# Patient Record
Sex: FEMALE | Race: BLACK OR AFRICAN AMERICAN | NOT HISPANIC OR LATINO
[De-identification: names, ages, dates, MRNs, and addresses within clinical notes are randomized per-mention and may not be internally consistent; named-entity substitution may affect disease eponyms.]

---

## 2019-12-05 ENCOUNTER — APPOINTMENT (OUTPATIENT)
Dept: ORTHOPEDIC SURGERY | Facility: CLINIC | Age: 59
End: 2019-12-05
Payer: COMMERCIAL

## 2019-12-05 VITALS — WEIGHT: 260 LBS | HEIGHT: 66 IN | BODY MASS INDEX: 41.78 KG/M2

## 2019-12-05 VITALS — OXYGEN SATURATION: 98 % | SYSTOLIC BLOOD PRESSURE: 161 MMHG | HEART RATE: 96 BPM | DIASTOLIC BLOOD PRESSURE: 90 MMHG

## 2019-12-05 DIAGNOSIS — Z86.39 PERSONAL HISTORY OF OTHER ENDOCRINE, NUTRITIONAL AND METABOLIC DISEASE: ICD-10-CM

## 2019-12-05 DIAGNOSIS — Z87.39 PERSONAL HISTORY OF OTHER DISEASES OF THE MUSCULOSKELETAL SYSTEM AND CONNECTIVE TISSUE: ICD-10-CM

## 2019-12-05 DIAGNOSIS — Z78.9 OTHER SPECIFIED HEALTH STATUS: ICD-10-CM

## 2019-12-05 DIAGNOSIS — Z82.62 FAMILY HISTORY OF OSTEOPOROSIS: ICD-10-CM

## 2019-12-05 DIAGNOSIS — Z86.79 PERSONAL HISTORY OF OTHER DISEASES OF THE CIRCULATORY SYSTEM: ICD-10-CM

## 2019-12-05 DIAGNOSIS — Z82.61 FAMILY HISTORY OF ARTHRITIS: ICD-10-CM

## 2019-12-05 PROCEDURE — 72170 X-RAY EXAM OF PELVIS: CPT

## 2019-12-05 PROCEDURE — 73562 X-RAY EXAM OF KNEE 3: CPT | Mod: 50

## 2019-12-05 PROCEDURE — 99204 OFFICE O/P NEW MOD 45 MIN: CPT

## 2019-12-10 PROBLEM — Z82.61 FAMILY HISTORY OF ARTHRITIS: Status: ACTIVE | Noted: 2019-12-05

## 2019-12-10 PROBLEM — Z86.79 HISTORY OF HYPERTENSION: Status: RESOLVED | Noted: 2019-12-05 | Resolved: 2019-12-10

## 2019-12-10 PROBLEM — Z82.62 FAMILY HISTORY OF OSTEOPOROSIS: Status: ACTIVE | Noted: 2019-12-05

## 2019-12-10 PROBLEM — Z87.39 HISTORY OF ARTHRITIS: Status: RESOLVED | Noted: 2019-12-05 | Resolved: 2019-12-10

## 2019-12-10 PROBLEM — Z86.39 HISTORY OF DIABETES MELLITUS: Status: RESOLVED | Noted: 2019-12-05 | Resolved: 2019-12-10

## 2019-12-10 PROBLEM — Z78.9 DOES NOT USE ILLICIT DRUGS: Status: ACTIVE | Noted: 2019-12-05

## 2019-12-10 NOTE — PHYSICAL EXAM
[Antalgic] : antalgic [Ankle] : ankle 2+ and symmetric bilaterally [LE] : Sensory: Intact in bilateral lower extremities [Knee] : patellar 2+ and symmetric bilaterally [DP] : dorsalis pedis 2+ and symmetric bilaterally [PT] : posterior tibial 2+ and symmetric bilaterally [de-identified] : On general examination the patient is adequately groomed and nourished. The vital parameters are as recorded. \par There is no lymphedema or diffuse swelling, no varicose veins, no skin warmth/erythema/scars/swelling, no ulcers and no palpable lymph nodes or masses in both lower extremities. Bilateral pedal pulses are well palpable.\par Upper Extremity:\par Both right and left upper extremities are unremarkable in terms of skin rash, lesions, pigmentation, redness, tenderness, swelling, joint instability, abnormal deformity or crepitus. The overall range of motion, sensation, motor tone and strength testing are normal.\par \par Bilateral Knee Exam: \par The gait is bilateral stiff knee antalgic.\par Knee alignment:         varus\par Both knees demonstrate no scars and the skin has no warmth, erythema, swelling or tenderness. \par Both knees have a range of motion of\par Extension:                    Right 0 degrees     Left 0 degrees\par Flexion:                                   Right 110 degrees      Left 110 degrees\par There is bilateral knee medial joint line tenderness. There is bilateral knee mild effusion. \par Joellen's test is positive. Jagjit test is positive.\par Lachman's test, Anterior/Posterior Drawer test and Pivot Shift Tests are negative. \par There is bilateral knee grade 1 MCL mediolateral laxity and no anteroposterior instability. \par Patella compression test is positive and patellofemoral tracking is normal with no lateral subluxation, apprehension or instability. \par Right knee quadriceps and hamstrings power is 4+.\par Left knee quadriceps and hamstrings power is 4+.\par \par Spine Exam:\par There is mild curvature of the spine with loss of normal lumbar lordosis. The skin is devoid of erythema,  pigmentation or rashes. There is a well healed scar from prior fusion. There is mild lumbar spasm and tenderness especially at L4-L5 or L5-S1. \par The range of motion of the lumbar spine is limited by pain and spasm. Forward flexion is 80% normal, extension catch positive, lateral flexion and rotation 80% normal. There is no lumbar spine imbalance or instability detected.\par Bilateral passive SLR is right 40 degrees, left 40 degrees in supine and sitting positions. Lasegue's Test is negative.\par Neurology:\par The patient is alert and oriented in person, place and time. The mood is calm and affect is normal.\par Testing for coordination including Rhomberg's Test and Finger-Nose Test, sensation, motor tone and power and deep tendon reflexes in both lower extremities is normal.\par \par Hip Exam:\par The gait and station is normal\par The patient has equal leg lengths and no pelvic tilt. Jin/Jamilah test is 7 inches on the right and 7 inches on the left. Active SLR is 60 degrees on the right and 60 degrees on the left. Both hips demonstrate no scars and the skin has no signs of inflammation or tenderness. \par Both Hips have a normal range of motion of flexion to 100 degrees, abduction 40 degrees, adduction 20 degrees, external rotation 40 degrees, internal rotation 20 degrees with symmetrical motion in flexion and extension. There is no flexion contracture, deformity or instability. Labral impingement tests are negative.\par Both hips flexor, abductor and extensor power is normal.\par  [Obese] : obese [de-identified] : The following radiographs were ordered and read by me during this patients visit. I reviewed each radiograph in detail with the patient and discussed the findings as highlighted below. \par AP, lateral and skyline views of the bilateral knees confirm advanced degenerative joint disease with medial joint space narrowing and extensive osteophyte formation of the left knee, with medial and lateral joint space collapse of the right knee, and extensive osteophyte formation\par AP view of the pelvis are within normal limits\par

## 2019-12-10 NOTE — HISTORY OF PRESENT ILLNESS
[de-identified] : Ms. MILADIS FRANCIS is a 59 year old female  presenting with bilateral knee pain, worsening over the last couple years. She localizes the pain to the medial anterior aspect of the bilateral knees. She admits to occasional swelling, clicking and grinding of the knee. She has occasional buckling as well. she describes the pain as throbbing and burning at times, and states the pain is present when walking, bending, laying down, climbing stairs, standing for long periods of time. . \par She admits to a previous history of treatment to bilateral knees. She notes she underwent Arthroscopy to the right knee in 2017, with only mild relief following surgery, with now worsening of pain. She also admits to a round of Orthovisc to the left knee one month ago, with no relief. she has had cortisone injections to bilateral knees, as well as PT, with no lasting relief. She notes she has been doing exercises on her own at the gym, but notes she is limited to pain. she has limitations of  quality of life, and is here to discuss total knee replacement.\par PSHX: right knee scope 2017, lumbar spine fusion L4-5 2018. \par  [8] : a current pain level of 8/10 [6] : an average pain level of 6/10 [Worsening] : worsening

## 2019-12-10 NOTE — REVIEW OF SYSTEMS
[Joint Stiffness] : joint stiffness [Joint Pain] : joint pain [Joint Swelling] : joint swelling [Constipation] : constipation [Muscle Weakness] : muscle weakness [Negative] : Heme/Lymph

## 2019-12-10 NOTE — DISCUSSION/SUMMARY
[de-identified] : Bilateral knee advanced degenerative joint disease with prior lumbar fusion. \par \par The natural history and treatment of degenerative arthritis was discussed with the patient at length today. The spectrum of treatment including nonoperative modalities to surgical intervention was elucidated. Noninvasive and nonoperative treatment modalities include weight reduction, activity modification with low impact exercise,  as needed use of acetaminophen or anti-inflammatory medications if tolerated, glucosamine/chondroitin supplements, and physical therapy. Further treatments can include corticosteroid injection and the use of viscosupplementation with hyaluronic acid injections. Definitive surgical treatment can certainly include total joint arthroplasty also. The risks and benefits of each treatment options was discussed and all questions were answered.\par \par In view of lack of adequate pain relief with conservative (non-surgical) management protocol including physical therapy, home exercises, weight loss, activity modification, NSAIDS; the patient will ultimately require a knee replacement for both knees. We discussed at this time that she is not a surgical candidate due to obesity, as well as recent injection to the left knee. We discussed that she would not be a candidate for three months following a gel injection due to risk of infection. \par \par The patient was informed of the findings and recommended conservative management in the form of a home exercise program, activity modifications, stationary bicycling, swimming and weight loss program. A trial of Glucosamine and Chondroiten Sulphate was recommended.\par A prescription for a course of physical therapy was provided.\par A prescription for non-steroidal anti-inflammatory medication - nabumetone was provided and the risks, benefits and side effects were discussed.\par I have recommended Euflexxa injections to the RIGHT knee and the patient agreed to proceed, and the risks, benefits and side effects were discussed.Follow-up appointment was recommended once the injection is received in the office to begin the series\par \par \par

## 2019-12-10 NOTE — CONSULT LETTER
[Dear  ___] : Dear  [unfilled], [Consult Closing:] : Thank you very much for allowing me to participate in the care of this patient.  If you have any questions, please do not hesitate to contact me. [Please see my note below.] : Please see my note below. [Consult Letter:] : I had the pleasure of evaluating your patient, [unfilled]. [Sincerely,] : Sincerely, [FreeTextEntry2] : Heather Almaraz [FreeTextEntry3] : Hamlet Knight MD\par \par ______________________________________________\par [facility]: Hip/Knee Arthroplasty\par [address], [address], [place] [address]\par (t) [phone]\par (f) [phone]\par

## 2020-01-23 ENCOUNTER — APPOINTMENT (OUTPATIENT)
Dept: ORTHOPEDIC SURGERY | Facility: CLINIC | Age: 60
End: 2020-01-23
Payer: COMMERCIAL

## 2020-01-23 VITALS
HEART RATE: 90 BPM | SYSTOLIC BLOOD PRESSURE: 123 MMHG | HEIGHT: 66 IN | WEIGHT: 263 LBS | DIASTOLIC BLOOD PRESSURE: 72 MMHG | BODY MASS INDEX: 42.27 KG/M2

## 2020-01-23 PROCEDURE — 20610 DRAIN/INJ JOINT/BURSA W/O US: CPT | Mod: RT

## 2020-01-30 ENCOUNTER — APPOINTMENT (OUTPATIENT)
Dept: ORTHOPEDIC SURGERY | Facility: CLINIC | Age: 60
End: 2020-01-30
Payer: COMMERCIAL

## 2020-01-30 PROCEDURE — 20610 DRAIN/INJ JOINT/BURSA W/O US: CPT | Mod: RT

## 2020-01-30 NOTE — PROCEDURE
[Injection] : Injection [Knee Joint] : knee joint [Right] : of the right [Osteoarthritis] : Osteoarthritis [Inflammation] : Inflammation [Patient] : patient [Risk] : risk [Benefits] : benefits [Alternatives] : alternatives [Bleeding] : bleeding [Allergic Reaction] : allergic reaction [Infection] : infection [Alcohol] : Alcohol [Verbal Consent Obtained] : verbal consent was obtained prior to the procedure [Betadine] : Betadine [Ethyl Chloride Spray] : ethyl chloride spray was used as a topical anesthetic [Lateral] : lateral [22] : a 22-gauge [2 mL Euflexxa___(lot #)] : 2mL Euflexxa ~Ulot# [unfilled] [Bandage Applied] : a bandage [None] : none [Tolerated Well] : The patient tolerated the procedure well

## 2020-02-06 ENCOUNTER — APPOINTMENT (OUTPATIENT)
Dept: ORTHOPEDIC SURGERY | Facility: CLINIC | Age: 60
End: 2020-02-06
Payer: COMMERCIAL

## 2020-02-06 PROCEDURE — 20610 DRAIN/INJ JOINT/BURSA W/O US: CPT | Mod: RT

## 2020-02-06 NOTE — PROCEDURE
[Injection] : Injection [Right] : of the right [Osteoarthritis] : Osteoarthritis [Knee Joint] : knee joint [Risk] : risk [Patient] : patient [Inflammation] : Inflammation [Alternatives] : alternatives [Benefits] : benefits [Bleeding] : bleeding [Infection] : infection [Allergic Reaction] : allergic reaction [Verbal Consent Obtained] : verbal consent was obtained prior to the procedure [Alcohol] : Alcohol [Betadine] : Betadine [Lateral] : lateral [Ethyl Chloride Spray] : ethyl chloride spray was used as a topical anesthetic [2 mL Euflexxa___(lot #)] : 2mL Euflexxa ~Ulot# [unfilled] [22] : a 22-gauge [Tolerated Well] : The patient tolerated the procedure well [Bandage Applied] : a bandage [None] : none [de-identified] : The patient received the third and last Euflexxa injection to the right knee and tolerated well. \par The patient has been instructed to ice and elevate to alleviate/reduce swelling. \par follow up appointment recommended in four to six months\par

## 2020-02-12 NOTE — PROCEDURE
[Injection] : Injection [Right] : of the right [Knee Joint] : knee joint [Osteoarthritis] : Osteoarthritis [Inflammation] : Inflammation [Risk] : risk [Patient] : patient [Benefits] : benefits [Alternatives] : alternatives [Bleeding] : bleeding [Infection] : infection [Verbal Consent Obtained] : verbal consent was obtained prior to the procedure [Allergic Reaction] : allergic reaction [Alcohol] : Alcohol [Betadine] : Betadine [Ethyl Chloride Spray] : ethyl chloride spray was used as a topical anesthetic [Lateral] : lateral [22] : a 22-gauge [2 mL Euflexxa___(lot #)] : 2mL Euflexxa ~Ulot# [unfilled] [Bandage Applied] : a bandage [Tolerated Well] : The patient tolerated the procedure well [None] : none [de-identified] : The patient received the first set of injections to right knee of Euflexxa and tolerated well. \par The patient has been instructed to ice and elevate to alleviate/reduce swelling. \par follow up appointment recommended next week for the second set of injections to the right knee

## 2020-02-12 NOTE — HISTORY OF PRESENT ILLNESS
[8] : a current pain level of 8/10 [de-identified] : Patient is returning for first series of Euflexxa injections to the right knee for right knee DJD. But and bill approved.

## 2020-02-13 DIAGNOSIS — Z01.818 ENCOUNTER FOR OTHER PREPROCEDURAL EXAMINATION: ICD-10-CM

## 2020-03-02 ENCOUNTER — APPOINTMENT (OUTPATIENT)
Dept: SURGERY | Facility: CLINIC | Age: 60
End: 2020-03-02
Payer: COMMERCIAL

## 2020-03-02 VITALS
TEMPERATURE: 97.8 F | RESPIRATION RATE: 16 BRPM | HEIGHT: 66 IN | BODY MASS INDEX: 40.66 KG/M2 | SYSTOLIC BLOOD PRESSURE: 129 MMHG | DIASTOLIC BLOOD PRESSURE: 85 MMHG | OXYGEN SATURATION: 99 % | WEIGHT: 253 LBS | HEART RATE: 105 BPM

## 2020-03-02 DIAGNOSIS — M54.9 DORSALGIA, UNSPECIFIED: ICD-10-CM

## 2020-03-02 DIAGNOSIS — Z78.0 ASYMPTOMATIC MENOPAUSAL STATE: ICD-10-CM

## 2020-03-02 DIAGNOSIS — M17.11 UNILATERAL PRIMARY OSTEOARTHRITIS, RIGHT KNEE: ICD-10-CM

## 2020-03-02 DIAGNOSIS — G89.29 DORSALGIA, UNSPECIFIED: ICD-10-CM

## 2020-03-02 PROCEDURE — 99203 OFFICE O/P NEW LOW 30 MIN: CPT

## 2020-03-02 RX ORDER — MELOXICAM 15 MG/1
TABLET ORAL
Refills: 0 | Status: DISCONTINUED | COMMUNITY
End: 2020-03-02

## 2020-03-02 RX ORDER — METOPROLOL TARTRATE 75 MG/1
TABLET, FILM COATED ORAL
Refills: 0 | Status: DISCONTINUED | COMMUNITY
End: 2020-03-02

## 2020-03-02 RX ORDER — CHOLECALCIFEROL (VITAMIN D3) 25 MCG
TABLET ORAL
Refills: 0 | Status: ACTIVE | COMMUNITY

## 2020-03-02 RX ORDER — MULTIVIT-MIN/IRON/FOLIC ACID/K 18-600-40
CAPSULE ORAL
Refills: 0 | Status: ACTIVE | COMMUNITY

## 2020-03-02 RX ORDER — NABUMETONE 500 MG/1
500 TABLET, FILM COATED ORAL
Qty: 60 | Refills: 1 | Status: DISCONTINUED | COMMUNITY
Start: 2019-12-05 | End: 2020-03-02

## 2020-03-02 NOTE — PHYSICAL EXAM
[Obese, well nourished, in no acute distress] : obese, well nourished, in no acute distress [de-identified] : normal respirations and chest expansion  [Normal] : affect appropriate [de-identified] : soft, nontender, nondistended

## 2020-03-02 NOTE — ASSESSMENT
[FreeTextEntry1] : 59-year-old female with morbid obesity, height of 5 feet 6 inches, weight 253 pounds, and BMI 40.84 kg/m².  Comorbidities include hypertension, diabetes, knee osteoarthritis and back pain.  She is interested in lap band surgery.\par \par The patient has failed multiple prior attempts at weight loss and is now dealing with the side effects, risk factors, and poor quality of life associated with morbid obesity.  They would benefit from surgical weight loss as outlined in the NIH and  ASMBS consensus statements on bariatric surgery.  Surgical intervention is medically indicated.\par \par My impression is that the patient is a reasonable candidate for laparoscopic adjustable gastric banding.\par

## 2020-03-02 NOTE — CONSULT LETTER
[Dear  ___] : Dear  [unfilled], [Consult Closing:] : Thank you very much for allowing me to participate in the care of this patient.  If you have any questions, please do not hesitate to contact me. [Consult Letter:] : I had the pleasure of evaluating your patient, [unfilled]. [Please see my note below.] : Please see my note below. [FreeTextEntry3] : Didier Small MD, FACS\par [facility]\par [address]\[place] [address]\par tel. [phone]\par fax [phone]\par  \par   [FreeTextEntry2] : Dr. GILBERTO Knight. [Sincerely,] : Sincerely,

## 2020-03-02 NOTE — PLAN
[FreeTextEntry1] : I have discussed my impression and treatment plan with the patient.  All surgical options were discussed including non-surgical treatments.  The patient would like to proceed with laparoscopic adjustable gastric banding (Lap-Band).  \par \par Benefits and risks of the planned surgery were discussed in depth, particularly  bleeding, infection, dysphagia, GERD, blood clots, malnutrition, weight regain, prolonged hospital stay and the remote possibility of death.   Also discussed was the importance of adhering to the recommended nutritional guidelines and supplements and attending regular follow-up.  I reviewed the critical importance of behavioral modification and follow-up for band adjustments in order to optimize outcomes and avoid complications. The patient was given the opportunity to ask pertinent questions and expressed good understanding of the aforementioned issues.\par \par Plan will be for laparoscopic adjustable gastric banding after completion of:\par - medical weight management program\par - nutritional evaluation\par - medical, pulmonary and cardiac clearance\par - psychological evaluation

## 2020-03-02 NOTE — HISTORY OF PRESENT ILLNESS
[de-identified] : Pretty Morgan is a 58 y/o female here for weight loss surgery consultation. \par Today's weight is 253 lbs. BMI 40.8.\par \par The patient has been struggling with obesity for many years.  She has tried multiple diets, medically supervised weight loss plans, and exercise programs without success.  More recently, she has been suffering from significant knee pain that is preventing her from exercise.  The knee pain is exacerbating her back pain as well.  She needs to lose weight for both of these issues to improve.  She is interested in the lap band.\par \par The patient reports no history of heartburn or dysphagia.\par \par Past medical history significant for hypertension, diabetes knee pain, and back pain.\par Past surgical history significant for tubal ligation, drainage of rectal abscess, and spinal fusion.\par No reported history of DVT or bleeding complication.\par

## 2020-04-13 ENCOUNTER — APPOINTMENT (OUTPATIENT)
Dept: CARDIOLOGY | Facility: CLINIC | Age: 60
End: 2020-04-13

## 2020-06-24 ENCOUNTER — NON-APPOINTMENT (OUTPATIENT)
Age: 60
End: 2020-06-24

## 2020-06-24 ENCOUNTER — APPOINTMENT (OUTPATIENT)
Dept: PULMONOLOGY | Facility: CLINIC | Age: 60
End: 2020-06-24
Payer: COMMERCIAL

## 2020-06-24 ENCOUNTER — APPOINTMENT (OUTPATIENT)
Dept: CARDIOLOGY | Facility: CLINIC | Age: 60
End: 2020-06-24
Payer: COMMERCIAL

## 2020-06-24 VITALS
HEIGHT: 66 IN | WEIGHT: 258 LBS | OXYGEN SATURATION: 97 % | SYSTOLIC BLOOD PRESSURE: 140 MMHG | DIASTOLIC BLOOD PRESSURE: 80 MMHG | BODY MASS INDEX: 41.46 KG/M2 | TEMPERATURE: 97.8 F | HEART RATE: 96 BPM

## 2020-06-24 DIAGNOSIS — Z01.818 ENCOUNTER FOR OTHER PREPROCEDURAL EXAMINATION: ICD-10-CM

## 2020-06-24 DIAGNOSIS — Z00.00 ENCOUNTER FOR GENERAL ADULT MEDICAL EXAMINATION W/OUT ABNORMAL FINDINGS: ICD-10-CM

## 2020-06-24 DIAGNOSIS — R03.0 ELEVATED BLOOD-PRESSURE READING, W/OUT DIAGNOSIS OF HYPERTENSION: ICD-10-CM

## 2020-06-24 DIAGNOSIS — R06.00 DYSPNEA, UNSPECIFIED: ICD-10-CM

## 2020-06-24 DIAGNOSIS — Z13.6 ENCOUNTER FOR SCREENING FOR CARDIOVASCULAR DISORDERS: ICD-10-CM

## 2020-06-24 PROCEDURE — 71046 X-RAY EXAM CHEST 2 VIEWS: CPT

## 2020-06-24 PROCEDURE — 93000 ELECTROCARDIOGRAM COMPLETE: CPT

## 2020-06-24 PROCEDURE — 99204 OFFICE O/P NEW MOD 45 MIN: CPT

## 2020-06-24 RX ORDER — METOPROLOL TARTRATE 25 MG/1
25 TABLET, FILM COATED ORAL
Refills: 0 | Status: DISCONTINUED | COMMUNITY
End: 2020-06-24

## 2020-06-24 NOTE — HISTORY OF PRESENT ILLNESS
[FreeTextEntry1] : Pretty is a 60yo female with PMH of DM2 who presents today for cardiac clearance for bariatric surgery. Patient reports she is feeling well, she has been following with Dr. Small for planned bariatric surgery. Patient denies chest pain, shortness of breath, palpitations, dizziness, vision changes, n/v, abdominal pain, changes in bowel/bladder habits,  or appetite. Patient does endorse BL knee pain that has been ongoing for some time, she is need of BL nee replacement but will consider this after bariatric surgery pt with rare dyspnea

## 2020-06-24 NOTE — PHYSICAL EXAM
[General Appearance - Well Developed] : well developed [Normal Appearance] : normal appearance [Well Groomed] : well groomed [General Appearance - Well Nourished] : well nourished [No Deformities] : no deformities [General Appearance - In No Acute Distress] : no acute distress [Normal Conjunctiva] : the conjunctiva exhibited no abnormalities [Eyelids - No Xanthelasma] : the eyelids demonstrated no xanthelasmas [Normal Oral Mucosa] : normal oral mucosa [No Oral Pallor] : no oral pallor [No Oral Cyanosis] : no oral cyanosis [Normal Jugular Venous A Waves Present] : normal jugular venous A waves present [Normal Jugular Venous V Waves Present] : normal jugular venous V waves present [No Jugular Venous Gaines A Waves] : no jugular venous gaines A waves [Heart Rate And Rhythm] : heart rate and rhythm were normal [Heart Sounds] : normal S1 and S2 [Murmurs] : no murmurs present [Respiration, Rhythm And Depth] : normal respiratory rhythm and effort [Exaggerated Use Of Accessory Muscles For Inspiration] : no accessory muscle use [Abdomen Soft] : soft [Abdomen Tenderness] : non-tender [Auscultation Breath Sounds / Voice Sounds] : lungs were clear to auscultation bilaterally [Abdomen Mass (___ Cm)] : no abdominal mass palpated [FreeTextEntry1] : reproducible pain back and knee area  [Nail Clubbing] : no clubbing of the fingernails [Cyanosis, Localized] : no localized cyanosis [Petechial Hemorrhages (___cm)] : no petechial hemorrhages [] : no rash [Skin Color & Pigmentation] : normal skin color and pigmentation [No Venous Stasis] : no venous stasis [Skin Lesions] : no skin lesions [No Skin Ulcers] : no skin ulcer [No Xanthoma] : no  xanthoma was observed [Oriented To Time, Place, And Person] : oriented to person, place, and time [Affect] : the affect was normal [Mood] : the mood was normal [No Anxiety] : not feeling anxious

## 2020-06-29 ENCOUNTER — APPOINTMENT (OUTPATIENT)
Dept: SURGERY | Facility: CLINIC | Age: 60
End: 2020-06-29
Payer: COMMERCIAL

## 2020-06-29 VITALS
OXYGEN SATURATION: 97 % | BODY MASS INDEX: 41.32 KG/M2 | DIASTOLIC BLOOD PRESSURE: 87 MMHG | RESPIRATION RATE: 16 BRPM | TEMPERATURE: 97.7 F | HEART RATE: 86 BPM | SYSTOLIC BLOOD PRESSURE: 138 MMHG | WEIGHT: 256 LBS

## 2020-06-29 PROCEDURE — 99213 OFFICE O/P EST LOW 20 MIN: CPT

## 2020-06-29 RX ORDER — METOPROLOL SUCCINATE 25 MG/1
25 TABLET, EXTENDED RELEASE ORAL
Refills: 0 | Status: ACTIVE | COMMUNITY

## 2020-06-29 NOTE — HISTORY OF PRESENT ILLNESS
[de-identified] : Eddie Olsen is a 59 year old female here for a follow up visit for continuing medical weight management.\par Down 2 pounds.  Reports no changes in medical history.  Has done some more research and has now decided that she would like to proceed with the sleeve gastrectomy as opposed to the LAP-BAND.

## 2020-06-29 NOTE — ASSESSMENT
[FreeTextEntry1] : 59-year-old female with morbid obesity (BMI 41) and comorbidities of hypertension, diabetes, and osteoarthritis resenting for continuing medical weight management in preparation for laparoscopic sleeve gastrectomy.  The patient is still struggling with her weight and her medical comorbidities are unchanged.

## 2020-06-29 NOTE — PHYSICAL EXAM
[Obese, well nourished, in no acute distress] : obese, well nourished, in no acute distress [de-identified] : normal respirations and chest expansion  [de-identified] : soft, nontender, nondistended  [Normal] : affect appropriate

## 2020-06-29 NOTE — PLAN
[FreeTextEntry1] : Discussed pre- and post-operative nutritional recommendations. Reinforced healthy food choices with a focus on fresh whole foods, daily activity and mindful eating. All questions were answered. \par \par The patient expressed understanding of all behavioral and nutritional recommendations and agrees to practice them with the understanding that success with these behaviors will be assessed at future visits. \par \par F/u 1 month

## 2020-07-06 ENCOUNTER — APPOINTMENT (OUTPATIENT)
Dept: CARDIOLOGY | Facility: CLINIC | Age: 60
End: 2020-07-06
Payer: COMMERCIAL

## 2020-07-06 PROCEDURE — 93306 TTE W/DOPPLER COMPLETE: CPT

## 2020-07-16 LAB
25(OH)D3 SERPL-MCNC: 36.4 NG/ML
ALBUMIN SERPL ELPH-MCNC: 3.9 G/DL
ALP BLD-CCNC: 92 U/L
ALT SERPL-CCNC: 21 U/L
ANION GAP SERPL CALC-SCNC: 13 MMOL/L
AST SERPL-CCNC: 18 U/L
BASOPHILS # BLD AUTO: 0.04 K/UL
BASOPHILS NFR BLD AUTO: 0.6 %
BILIRUB SERPL-MCNC: 0.4 MG/DL
BUN SERPL-MCNC: 10 MG/DL
CALCIUM SERPL-MCNC: 9.4 MG/DL
CHLORIDE SERPL-SCNC: 104 MMOL/L
CHOLEST SERPL-MCNC: 185 MG/DL
CHOLEST/HDLC SERPL: 2.9 RATIO
CO2 SERPL-SCNC: 27 MMOL/L
CREAT SERPL-MCNC: 0.8 MG/DL
EOSINOPHIL # BLD AUTO: 0.17 K/UL
EOSINOPHIL NFR BLD AUTO: 2.4 %
ESTIMATED AVERAGE GLUCOSE: 134 MG/DL
FERRITIN SERPL-MCNC: 90 NG/ML
FOLATE RBC-MCNC: 1855 NG/ML
FOLATE SERPL-MCNC: >20 NG/ML
GLUCOSE SERPL-MCNC: 118 MG/DL
HAPTOGLOB SERPL-MCNC: 278 MG/DL
HBA1C MFR BLD HPLC: 6.3 %
HCT VFR BLD CALC: 40.6 %
HCT VFR BLD CALC: 41.4 %
HDLC SERPL-MCNC: 63 MG/DL
HGB BLD-MCNC: 12.8 G/DL
IMM GRANULOCYTES NFR BLD AUTO: 0.3 %
IRON SERPL-MCNC: 54 UG/DL
LDH SERPL-CCNC: 238 U/L
LDLC SERPL CALC-MCNC: 109 MG/DL
LYMPHOCYTES # BLD AUTO: 2.57 K/UL
LYMPHOCYTES NFR BLD AUTO: 36.5 %
MAN DIFF?: NORMAL
MCHC RBC-ENTMCNC: 26.7 PG
MCHC RBC-ENTMCNC: 31.5 GM/DL
MCV RBC AUTO: 84.8 FL
MONOCYTES # BLD AUTO: 0.55 K/UL
MONOCYTES NFR BLD AUTO: 7.8 %
NEUTROPHILS # BLD AUTO: 3.69 K/UL
NEUTROPHILS NFR BLD AUTO: 52.4 %
PLATELET # BLD AUTO: 231 K/UL
POTASSIUM SERPL-SCNC: 4.5 MMOL/L
PROT SERPL-MCNC: 5.9 G/DL
RBC # BLD: 4.79 M/UL
RBC # FLD: 13.6 %
SODIUM SERPL-SCNC: 144 MMOL/L
TRANSFERRIN SERPL-MCNC: 220 MG/DL
TRIGL SERPL-MCNC: 60 MG/DL
TSH SERPL-ACNC: 1.55 UIU/ML
VIT B1 SERPL-MCNC: 79.3 NMOL/L
VIT B12 SERPL-MCNC: 1062 PG/ML
VIT B6 SERPL-MCNC: 4.6 UG/L
WBC # FLD AUTO: 7.04 K/UL

## 2020-07-27 ENCOUNTER — APPOINTMENT (OUTPATIENT)
Dept: SURGERY | Facility: CLINIC | Age: 60
End: 2020-07-27
Payer: COMMERCIAL

## 2020-07-27 VITALS
WEIGHT: 259 LBS | TEMPERATURE: 97.7 F | RESPIRATION RATE: 18 BRPM | SYSTOLIC BLOOD PRESSURE: 147 MMHG | BODY MASS INDEX: 41.62 KG/M2 | DIASTOLIC BLOOD PRESSURE: 85 MMHG | HEART RATE: 78 BPM | OXYGEN SATURATION: 99 % | HEIGHT: 66 IN

## 2020-07-27 PROCEDURE — 99213 OFFICE O/P EST LOW 20 MIN: CPT

## 2020-07-27 RX ORDER — HYDROCODONE BITARTRATE AND ACETAMINOPHEN 7.5; 325 MG/1; MG/1
7.5-325 TABLET ORAL
Qty: 42 | Refills: 0 | Status: ACTIVE | COMMUNITY
Start: 2020-07-17

## 2020-07-27 RX ORDER — DICLOFENAC SODIUM 75 MG/1
75 TABLET, DELAYED RELEASE ORAL
Qty: 60 | Refills: 1 | Status: DISCONTINUED | COMMUNITY
Start: 2020-01-23 | End: 2020-07-27

## 2020-07-27 RX ORDER — BLOOD SUGAR DIAGNOSTIC
STRIP MISCELLANEOUS
Qty: 100 | Refills: 0 | Status: ACTIVE | COMMUNITY
Start: 2020-06-30

## 2020-07-27 RX ORDER — LANCETS
EACH MISCELLANEOUS
Qty: 100 | Refills: 0 | Status: ACTIVE | COMMUNITY
Start: 2020-06-30

## 2020-07-27 NOTE — HISTORY OF PRESENT ILLNESS
[de-identified] : Eddie Olsen is a 59 year old female here for a follow up visit for continuing medical weight management. Today's weight 259 lbs. BMI 41.8 \par She is up 3 pounds from last visit.  She states that she has been exercising with weights and feels she has been losing inches.  She reports no changes in her medical history.\par Her GI series was performed, showing significant gastroesophageal reflux.  She states she does have heartburn symptoms.\par

## 2020-07-27 NOTE — PHYSICAL EXAM
[Obese, well nourished, in no acute distress] : obese, well nourished, in no acute distress [Normal] : affect appropriate [de-identified] : normal respirations and chest expansion  [de-identified] : soft, nontender, nondistended

## 2020-07-27 NOTE — ASSESSMENT
[FreeTextEntry1] : 59-year-old female with morbid obesity (BMI 41) and comorbidities of hypertension, diabetes, and osteoarthritis resenting for continuing medical weight management in preparation for laparoscopic sleeve gastrectomy.  The patient is still struggling with her weight and her medical comorbidities are unchanged.\par \par

## 2020-07-27 NOTE — PHYSICAL EXAM
[Obese, well nourished, in no acute distress] : obese, well nourished, in no acute distress [Normal] : affect appropriate [de-identified] : normal respirations and chest expansion  [de-identified] : soft, nontender, nondistended

## 2020-07-27 NOTE — HISTORY OF PRESENT ILLNESS
[de-identified] : Edide Olsen is a 59 year old female here for a follow up visit for continuing medical weight management. Today's weight 259 lbs. BMI 41.8 \par She is up 3 pounds from last visit.  She states that she has been exercising with weights and feels she has been losing inches.  She reports no changes in her medical history.\par Her GI series was performed, showing significant gastroesophageal reflux.  She states she does have heartburn symptoms.\par

## 2020-07-27 NOTE — PLAN
[FreeTextEntry1] : Discussed with patient the significance of finding of gastroesophageal reflux on upper GI series.  The patient is to schedule upper endoscopy with gastroenterology.  We will discuss surgical options following this study.  Sleeve gastrectomy may not be indicated if there is evidence of significant reflux and esophagitis.  In that case, I would recommend Alexa-en-Y gastric bypass, which would treat the reflux as well..\par \par Encouraged to eat more whole foods, read food labels and reduce intake of processed foods.\par Advised to eat more slowly, chew more thoroughly, put fork down in between bites. \par Discussed importance of incorporating these healthy eating habits in order to optimize post-operative outcome.\par \par F/u 1 month

## 2020-08-14 ENCOUNTER — APPOINTMENT (OUTPATIENT)
Dept: CARDIOLOGY | Facility: CLINIC | Age: 60
End: 2020-08-14
Payer: COMMERCIAL

## 2020-08-14 PROCEDURE — 93015 CV STRESS TEST SUPVJ I&R: CPT

## 2020-08-14 PROCEDURE — A9500: CPT

## 2020-08-14 PROCEDURE — 78452 HT MUSCLE IMAGE SPECT MULT: CPT

## 2020-08-14 RX ORDER — REGADENOSON 0.08 MG/ML
0.4 INJECTION, SOLUTION INTRAVENOUS
Qty: 1 | Refills: 0 | Status: COMPLETED | OUTPATIENT
Start: 2020-08-14

## 2020-08-14 RX ADMIN — REGADENOSON 0 MG/5ML: 0.08 INJECTION, SOLUTION INTRAVENOUS at 00:00

## 2020-08-24 ENCOUNTER — APPOINTMENT (OUTPATIENT)
Dept: SURGERY | Facility: CLINIC | Age: 60
End: 2020-08-24
Payer: COMMERCIAL

## 2020-08-24 VITALS
HEIGHT: 66 IN | OXYGEN SATURATION: 99 % | TEMPERATURE: 97.9 F | SYSTOLIC BLOOD PRESSURE: 132 MMHG | DIASTOLIC BLOOD PRESSURE: 86 MMHG | WEIGHT: 258.5 LBS | BODY MASS INDEX: 41.54 KG/M2 | HEART RATE: 95 BPM | RESPIRATION RATE: 15 BRPM

## 2020-08-24 PROCEDURE — 99213 OFFICE O/P EST LOW 20 MIN: CPT

## 2020-08-24 RX ORDER — CHLORHEXIDINE GLUCONATE 4 %
1000 LIQUID (ML) TOPICAL
Refills: 0 | Status: DISCONTINUED | COMMUNITY
End: 2020-08-24

## 2020-08-24 RX ORDER — GABAPENTIN 600 MG/1
600 TABLET, COATED ORAL
Refills: 0 | Status: DISCONTINUED | COMMUNITY
End: 2020-08-24

## 2020-08-24 RX ORDER — GABAPENTIN 800 MG/1
800 TABLET, COATED ORAL
Qty: 90 | Refills: 0 | Status: DISCONTINUED | COMMUNITY
Start: 2020-06-03 | End: 2020-08-24

## 2020-08-24 RX ORDER — CARBOXYMETHYLCELLULOSE SODIUM AND GLYCERIN 5; 9 MG/ML; MG/ML
0.5-0.9 SOLUTION/ DROPS OPHTHALMIC
Qty: 15 | Refills: 0 | Status: ACTIVE | COMMUNITY
Start: 2020-07-30

## 2020-08-24 RX ORDER — CANAGLIFLOZIN AND METFORMIN HYDROCHLORIDE 150; 500 MG/1; MG/1
150-500 TABLET, FILM COATED, EXTENDED RELEASE ORAL
Qty: 180 | Refills: 0 | Status: DISCONTINUED | COMMUNITY
Start: 2020-07-25 | End: 2020-08-24

## 2020-08-24 RX ORDER — HYALURONATE SODIUM 20 MG/2 ML
20 SYRINGE (ML) INTRAARTICULAR
Qty: 1 | Refills: 0 | Status: DISCONTINUED | COMMUNITY
Start: 2019-12-05 | End: 2020-08-24

## 2020-08-24 RX ORDER — GABAPENTIN 300 MG/1
300 CAPSULE ORAL
Qty: 60 | Refills: 0 | Status: DISCONTINUED | COMMUNITY
Start: 2020-01-31 | End: 2020-08-24

## 2020-08-24 NOTE — ASSESSMENT
[FreeTextEntry1] : 59-year-old female with morbid obesity (BMI 41) and comorbidities of hypertension, diabetes, and osteoarthritis presenting for continuing medical weight management in preparation for laparoscopic sleeve gastrectomy.  The patient is still struggling with her weight and her medical comorbidities are unchanged.\par \par

## 2020-08-24 NOTE — HISTORY OF PRESENT ILLNESS
[de-identified] : Pretty is a 60 y/o female here for a follow-up visit for continuing medical weight management.  She has lost approximately 1 pound and is still struggling with more significant weight loss.  She continues to have significant knee pain from osteoarthritis secondary to her weight.

## 2020-08-24 NOTE — PHYSICAL EXAM
[Obese, well nourished, in no acute distress] : obese, well nourished, in no acute distress [Normal] : grossly intact [de-identified] : normal respirations and chest expansion  [de-identified] : soft, nontender, nondistended

## 2020-08-24 NOTE — HISTORY OF PRESENT ILLNESS
[de-identified] : Pretty is a 58 y/o female here for a follow-up visit for continuing medical weight management.  She has lost approximately 1 pound and is still struggling with more significant weight loss.  She continues to have significant knee pain from osteoarthritis secondary to her weight.

## 2020-08-24 NOTE — PLAN
[FreeTextEntry1] : Encouraged to choose foods that decrease cravings and increase fullness. Increase lean protein intake, including plant proteins.\par The importance of hydration was emphasized. \par The importance of eating slowly was emphasized.\par The patient is committed to learning and incorporating diet and exercise modifications to optimize success after bariatric surgery.\par \par F/u 1 month \par

## 2020-08-24 NOTE — PHYSICAL EXAM
[Obese, well nourished, in no acute distress] : obese, well nourished, in no acute distress [Normal] : normal appearance, no rash, nodules, vesicles, ulcers, erythema [de-identified] : soft, nontender, nondistended  [de-identified] : normal respirations and chest expansion

## 2020-09-21 ENCOUNTER — APPOINTMENT (OUTPATIENT)
Dept: SURGERY | Facility: CLINIC | Age: 60
End: 2020-09-21
Payer: COMMERCIAL

## 2020-09-21 VITALS
SYSTOLIC BLOOD PRESSURE: 129 MMHG | WEIGHT: 263 LBS | HEART RATE: 88 BPM | BODY MASS INDEX: 42.27 KG/M2 | OXYGEN SATURATION: 99 % | RESPIRATION RATE: 17 BRPM | DIASTOLIC BLOOD PRESSURE: 85 MMHG | TEMPERATURE: 97.9 F | HEIGHT: 66 IN

## 2020-09-21 PROCEDURE — 99213 OFFICE O/P EST LOW 20 MIN: CPT

## 2020-09-21 NOTE — ASSESSMENT
[FreeTextEntry1] : 59-year-old female with morbid obesity (BMI 41) and comorbidities of hypertension, diabetes, and osteoarthritis presenting for continuing medical weight management in preparation for laparoscopic sleeve gastrectomy.  The patient is still struggling with her weight.  She does have reflux as seen on upper GI series.  I would need to see upper endoscopy report and any pathology before ultimately deciding on sleeve gastrectomy versus Alexa-en-Y gastric bypass, given the presence of reflux.\par \par

## 2020-09-21 NOTE — PHYSICAL EXAM
[Obese, well nourished, in no acute distress] : obese, well nourished, in no acute distress [de-identified] : Normal respirations [de-identified] : Soft, nontender, nondistended

## 2020-09-21 NOTE — PLAN
[FreeTextEntry1] : Reviewed preoperative and postoperative dietary instructions.\par Reviewed importance of adhering to bariatric dietary instructions during the post-operative period.\par Reviewed importance of maintaining physical activity level before and after surgery to avoid complications.\par \par All questions answered.

## 2020-11-23 ENCOUNTER — APPOINTMENT (OUTPATIENT)
Dept: SURGERY | Facility: CLINIC | Age: 60
End: 2020-11-23
Payer: COMMERCIAL

## 2020-11-23 VITALS
DIASTOLIC BLOOD PRESSURE: 87 MMHG | BODY MASS INDEX: 42.59 KG/M2 | HEART RATE: 86 BPM | HEIGHT: 66 IN | SYSTOLIC BLOOD PRESSURE: 151 MMHG | WEIGHT: 265 LBS | RESPIRATION RATE: 17 BRPM | OXYGEN SATURATION: 98 % | TEMPERATURE: 97.5 F

## 2020-11-23 PROCEDURE — 99213 OFFICE O/P EST LOW 20 MIN: CPT

## 2020-12-09 ENCOUNTER — OUTPATIENT (OUTPATIENT)
Dept: OUTPATIENT SERVICES | Facility: HOSPITAL | Age: 60
LOS: 1 days | End: 2020-12-09
Payer: COMMERCIAL

## 2020-12-09 VITALS
WEIGHT: 266.1 LBS | HEIGHT: 66 IN | DIASTOLIC BLOOD PRESSURE: 88 MMHG | HEART RATE: 81 BPM | RESPIRATION RATE: 14 BRPM | SYSTOLIC BLOOD PRESSURE: 140 MMHG | OXYGEN SATURATION: 97 % | TEMPERATURE: 98 F

## 2020-12-09 DIAGNOSIS — Z98.1 ARTHRODESIS STATUS: Chronic | ICD-10-CM

## 2020-12-09 DIAGNOSIS — Z87.19 PERSONAL HISTORY OF OTHER DISEASES OF THE DIGESTIVE SYSTEM: Chronic | ICD-10-CM

## 2020-12-09 DIAGNOSIS — E66.9 OBESITY, UNSPECIFIED: ICD-10-CM

## 2020-12-09 DIAGNOSIS — G47.33 OBSTRUCTIVE SLEEP APNEA (ADULT) (PEDIATRIC): ICD-10-CM

## 2020-12-09 DIAGNOSIS — Z98.890 OTHER SPECIFIED POSTPROCEDURAL STATES: Chronic | ICD-10-CM

## 2020-12-09 DIAGNOSIS — E66.01 MORBID (SEVERE) OBESITY DUE TO EXCESS CALORIES: ICD-10-CM

## 2020-12-09 DIAGNOSIS — Z01.818 ENCOUNTER FOR OTHER PREPROCEDURAL EXAMINATION: ICD-10-CM

## 2020-12-09 DIAGNOSIS — E11.9 TYPE 2 DIABETES MELLITUS WITHOUT COMPLICATIONS: ICD-10-CM

## 2020-12-09 DIAGNOSIS — Z29.9 ENCOUNTER FOR PROPHYLACTIC MEASURES, UNSPECIFIED: ICD-10-CM

## 2020-12-09 LAB
A1C WITH ESTIMATED AVERAGE GLUCOSE RESULT: 6.2 % — HIGH (ref 4–5.6)
ALBUMIN SERPL ELPH-MCNC: 4 G/DL — SIGNIFICANT CHANGE UP (ref 3.3–5)
ALP SERPL-CCNC: 101 U/L — SIGNIFICANT CHANGE UP (ref 40–120)
ALT FLD-CCNC: 18 U/L — SIGNIFICANT CHANGE UP (ref 10–45)
ANION GAP SERPL CALC-SCNC: 11 MMOL/L — SIGNIFICANT CHANGE UP (ref 5–17)
AST SERPL-CCNC: 18 U/L — SIGNIFICANT CHANGE UP (ref 10–40)
BILIRUB SERPL-MCNC: 0.3 MG/DL — SIGNIFICANT CHANGE UP (ref 0.2–1.2)
BLD GP AB SCN SERPL QL: NEGATIVE — SIGNIFICANT CHANGE UP
BUN SERPL-MCNC: 11 MG/DL — SIGNIFICANT CHANGE UP (ref 7–23)
CALCIUM SERPL-MCNC: 9.6 MG/DL — SIGNIFICANT CHANGE UP (ref 8.4–10.5)
CHLORIDE SERPL-SCNC: 102 MMOL/L — SIGNIFICANT CHANGE UP (ref 96–108)
CO2 SERPL-SCNC: 28 MMOL/L — SIGNIFICANT CHANGE UP (ref 22–31)
CREAT SERPL-MCNC: 0.83 MG/DL — SIGNIFICANT CHANGE UP (ref 0.5–1.3)
ESTIMATED AVERAGE GLUCOSE: 131 MG/DL — HIGH (ref 68–114)
GLUCOSE SERPL-MCNC: 96 MG/DL — SIGNIFICANT CHANGE UP (ref 70–99)
HCT VFR BLD CALC: 39.5 % — SIGNIFICANT CHANGE UP (ref 34.5–45)
HGB BLD-MCNC: 12.4 G/DL — SIGNIFICANT CHANGE UP (ref 11.5–15.5)
MCHC RBC-ENTMCNC: 26.4 PG — LOW (ref 27–34)
MCHC RBC-ENTMCNC: 31.4 GM/DL — LOW (ref 32–36)
MCV RBC AUTO: 84 FL — SIGNIFICANT CHANGE UP (ref 80–100)
NRBC # BLD: 0 /100 WBCS — SIGNIFICANT CHANGE UP (ref 0–0)
PLATELET # BLD AUTO: 241 K/UL — SIGNIFICANT CHANGE UP (ref 150–400)
POTASSIUM SERPL-MCNC: 4.5 MMOL/L — SIGNIFICANT CHANGE UP (ref 3.5–5.3)
POTASSIUM SERPL-SCNC: 4.5 MMOL/L — SIGNIFICANT CHANGE UP (ref 3.5–5.3)
PROT SERPL-MCNC: 6.3 G/DL — SIGNIFICANT CHANGE UP (ref 6–8.3)
RBC # BLD: 4.7 M/UL — SIGNIFICANT CHANGE UP (ref 3.8–5.2)
RBC # FLD: 13.6 % — SIGNIFICANT CHANGE UP (ref 10.3–14.5)
RH IG SCN BLD-IMP: POSITIVE — SIGNIFICANT CHANGE UP
SODIUM SERPL-SCNC: 141 MMOL/L — SIGNIFICANT CHANGE UP (ref 135–145)
WBC # BLD: 6.63 K/UL — SIGNIFICANT CHANGE UP (ref 3.8–10.5)
WBC # FLD AUTO: 6.63 K/UL — SIGNIFICANT CHANGE UP (ref 3.8–10.5)

## 2020-12-09 PROCEDURE — 80053 COMPREHEN METABOLIC PANEL: CPT

## 2020-12-09 PROCEDURE — 86900 BLOOD TYPING SEROLOGIC ABO: CPT

## 2020-12-09 PROCEDURE — G0463: CPT

## 2020-12-09 PROCEDURE — 86850 RBC ANTIBODY SCREEN: CPT

## 2020-12-09 PROCEDURE — 85027 COMPLETE CBC AUTOMATED: CPT

## 2020-12-09 PROCEDURE — 86901 BLOOD TYPING SEROLOGIC RH(D): CPT

## 2020-12-09 PROCEDURE — 83036 HEMOGLOBIN GLYCOSYLATED A1C: CPT

## 2020-12-09 RX ORDER — CEFAZOLIN SODIUM 1 G
3000 VIAL (EA) INJECTION ONCE
Refills: 0 | Status: COMPLETED | OUTPATIENT
Start: 2020-12-15 | End: 2020-12-15

## 2020-12-09 NOTE — H&P PST ADULT - ASSESSMENT
CAPRINI SCORE [CLOT updated 18]    AGE RELATED RISK FACTORS                                                       MOBILITY RELATED FACTORS  [x] Age 41-60 years                                            (1 Point)                    [ ] Bed rest                                                        (1 Point)  [ ] Age: 61-74 years                                           (2 Points)                  [ ] Plaster cast                                                   (2 Points)  [ ] Age= 75 years                                              (3 Points)                    [ ] Bed bound for more than 72 hours                 (2 Points)    DISEASE RELATED RISK FACTORS                                               GENDER SPECIFIC FACTORS  [ ] Edema in the lower extremities                       (1 Point)              [ ] Pregnancy                                                     (1 Point)  [ ] Varicose veins                                               (1 Point)                     [ ] Post-partum < 6 weeks                                   (1 Point)             [x] BMI > 25 Kg/m2                                            (1 Point)                     [ ] Hormonal therapy  or oral contraception          (1 Point)                 [ ] Sepsis (in the previous month)                        (1 Point)               [ ] History of pregnancy complications                 (1 point)  [ ] Pneumonia or serious lung disease                                               [ ] Unexplained or recurrent                     (1 Point)           (in the previous month)                               (1 Point)  [ ] Abnormal pulmonary function test                     (1 Point)                 SURGERY RELATED RISK FACTORS  [ ] Acute myocardial infarction                              (1 Point)               [ ]  Section                                             (1 Point)  [ ] Congestive heart failure (in the previous month)  (1 Point)      [ ] Minor surgery                                                  (1 Point)   [ ] Inflammatory bowel disease                             (1 Point)               [ ] Arthroscopic surgery                                        (2 Points)  [ ] Central venous access                                      (2 Points)                [x] General surgery lasting more than 45 minutes (2 points)  [ ] Present or previous malignancy                     (2 Points)                [ ] Elective arthroplasty                                         (5 points)    [ ] Stroke (in the previous month)                          (5 Points)                                                                                                                                                           HEMATOLOGY RELATED FACTORS                                                 TRAUMA RELATED RISK FACTORS  [ ] Prior episodes of VTE                                     (3 Points)                [ ] Fracture of the hip, pelvis, or leg                       (5 Points)  [ ] Positive family history for VTE                         (3 Points)             [ ] Acute spinal cord injury (in the previous month)  (5 Points)  [ ] Prothrombin 37365 A                                     (3 Points)               [ ] Paralysis  (less than 1 month)                             (5 Points)  [ ] Factor V Leiden                                             (3 Points)                  [ ] Multiple Trauma within 1 month                        (5 Points)  [ ] Lupus anticoagulants                                     (3 Points)                                                           [ ] Anticardiolipin antibodies                               (3 Points)                                                       [ ] High homocysteine in the blood                      (3 Points)                                             [ ] Other congenital or acquired thrombophilia      (3 Points)                                                [ ] Heparin induced thrombocytopenia                  (3 Points)                                     Total Score [    4     ]

## 2020-12-09 NOTE — H&P PST ADULT - ATTENDING COMMENTS
I have seen and evaluated the patient and certify that the history and physical exam, performed within the last 30 days, remains accurate as documented with any changes noted below.  Risks, benefits, and alternatives to surgery have been discussed with the patient, who has expressed appropriate understanding and wishes to proceed, having given informed consent.    Didier Small MD

## 2020-12-09 NOTE — H&P PST ADULT - NSICDXPASTMEDICALHX_GEN_ALL_CORE_FT
PAST MEDICAL HISTORY:  WILLAM (obstructive sleep apnea) dx 12/2/20, recommendation CPAP at night, pt has not started treatment yet     PAST MEDICAL HISTORY:  HTN (hypertension)     OA (osteoarthritis) bilateral knees    Obesity BMI 43.0    WILLAM (obstructive sleep apnea) dx 12/2/20, recommendation CPAP at night, pt has not started treatment yet    T2DM (type 2 diabetes mellitus)

## 2020-12-09 NOTE — H&P PST ADULT - NSICDXPASTSURGICALHX_GEN_ALL_CORE_FT
PAST SURGICAL HISTORY:  H/O colonoscopy 11/25/20    H/O endoscopy 8/13/20    History of rectal abscess drainage 2012    S/P arthroscopy of knee right 2016    S/P lumbar spinal fusion 2018 +hardware

## 2020-12-09 NOTE — H&P PST ADULT - HISTORY OF PRESENT ILLNESS
59 y/o obese female, with h/o HTN, T2DM, WILLAM (recently dx 12/2/20- not on treatment at this time), 59 y/o female, with h/o HTN, T2DM, WILLAM (recently dx 12/2/20- not on treatment at this time), obesity (BMI 43), has tried multiple modalities to lose weight without success. Evaluated by Dr. Small. Presents to PST for a scheduled laparoscopic sleeve gastrectomy on 12/15/2020.    *Covid PCR scheduled 12/12/20 at [facility]

## 2020-12-09 NOTE — H&P PST ADULT - ACTIVITY
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

## 2020-12-09 NOTE — H&P PST ADULT - PAIN CHRONIC, PROFILE
February 4, 2020     Patient: Carlos Blanchard   YOB: 1960   Date of Visit: 2/4/2020       To Whom it May Concern:    Carlos Blanchard was seen in my clinic on 2/4/2020 at 3:40 pm.    Please excuse Carlos for his absence from work on the date listed above to be able to make his appointment. Mr. Blanchard remains off work due to continued back pain. I will reevaluate in one month.    Sincerely,         Miguelito Escudero MD    Medical information is confidential and cannot be disclosed without the written consent of the patient or his representative.       no

## 2020-12-09 NOTE — H&P PST ADULT - NSICDXPROBLEM_GEN_ALL_CORE_FT
PROBLEM DIAGNOSES  Problem: Obesity  Assessment and Plan: Scheduled laparoscopic sleeve gastectomy 12/15/20  Pre-op instructions given to pt, chlorhexidine provided, incentive spirometry given and explained  Lab work sent at [facility]  Will obtain Medical Eval  Pharmacist (Fabian) to call pt regarding pre/post op medication regimen  Pt to take Metoprolol in am of sx with sip of water    Problem: WILLAM (obstructive sleep apnea)  Assessment and Plan: Or booking notified    Problem: T2DM (type 2 diabetes mellitus)  Assessment and Plan: Pt to hold Saxenda am of sx per [facility] guidelines  HgA1c sent at [facility]  FS on arrival to SDA    Problem: Need for prophylactic measure  Assessment and Plan: The Caprini score indicates this patient is at risk for a VTE event (score 3-5). Most surgical patients in this group would benefit from pharmacologic prophylaxis.  The surgical team will determine the balance between VTE risk and bleeding risk

## 2020-12-10 PROBLEM — E11.9 TYPE 2 DIABETES MELLITUS WITHOUT COMPLICATIONS: Chronic | Status: ACTIVE | Noted: 2020-12-09

## 2020-12-10 PROBLEM — M19.90 UNSPECIFIED OSTEOARTHRITIS, UNSPECIFIED SITE: Chronic | Status: ACTIVE | Noted: 2020-12-09

## 2020-12-10 PROBLEM — I10 ESSENTIAL (PRIMARY) HYPERTENSION: Chronic | Status: ACTIVE | Noted: 2020-12-09

## 2020-12-12 ENCOUNTER — OUTPATIENT (OUTPATIENT)
Dept: OUTPATIENT SERVICES | Facility: HOSPITAL | Age: 60
LOS: 1 days | End: 2020-12-12
Payer: COMMERCIAL

## 2020-12-12 DIAGNOSIS — Z87.19 PERSONAL HISTORY OF OTHER DISEASES OF THE DIGESTIVE SYSTEM: Chronic | ICD-10-CM

## 2020-12-12 DIAGNOSIS — Z98.890 OTHER SPECIFIED POSTPROCEDURAL STATES: Chronic | ICD-10-CM

## 2020-12-12 DIAGNOSIS — Z11.59 ENCOUNTER FOR SCREENING FOR OTHER VIRAL DISEASES: ICD-10-CM

## 2020-12-12 DIAGNOSIS — Z98.1 ARTHRODESIS STATUS: Chronic | ICD-10-CM

## 2020-12-12 LAB — SARS-COV-2 RNA SPEC QL NAA+PROBE: SIGNIFICANT CHANGE UP

## 2020-12-12 PROCEDURE — U0003: CPT

## 2020-12-14 ENCOUNTER — TRANSCRIPTION ENCOUNTER (OUTPATIENT)
Age: 60
End: 2020-12-14

## 2020-12-14 NOTE — PHARMACOTHERAPY INTERVENTION NOTE - COMMENTS
Vertical sleeve gastrectomy scheduled for 12/15/2020.    Patient medication reconciliation completed. Patient currently taking:   gabapentin 800 mg oral tablet: 1 tab(s) orally 3 times a day   meloxicam 15 mg oral tablet: 1 tab(s) orally once a day   methocarbamol 500 mg oral tablet: 1 tab(s) orally 2 times a day   metoprolol succinate 25 mg oral tablet, extended release: 1 tab(s) orally once a day in am   MiraLax oral powder for reconstitution: 1 dose(s) orally once a day, As Needed   Percocet 7.5/325 oral tablet: 1 tab(s) orally every 6 hours, As Needed (average 2 doses/day with 4 tablets remaining)  PNV Prenatal oral tablet: 1 tab(s) orally once a day   Refresh ophthalmic solution: 1 drop(s) to each affected eye 4 times a day, As Needed  Saxenda 18 mg/3 mL subcutaneous solution: subcutaneous once a day in am   Vitamin B-100 oral tablet: 1 tab(s) orally once a day   Vitamin C 1000 mg oral tablet: 1 tab(s) orally once a day   Vitamin D3 2000 intl units (50 mcg) oral tablet: 1 tab(s) orally once a day   Zinc 140 mg (as elemental zinc 50 mg) oral tablet: 1 tab(s) orally once a day         Patient was instructed to use crushed, dissolvable, chewable, or liquid formulations of medications for 1 month. Patient was informed to take daily multivitamins post surgically. Patient already switched supplements to chewable formulation. Patient reeducated on NSAID avoidance (ibuprofen, ASA, naproxen, aleve) as they increased risk of GI bleeding; may use APAP for mild pain otherwise contact prescriber for consult. Patient informed to monitor blood glucose while on Saxenda as blood glucose may improve after surgery. Reviewed s/s of hypoglycemia and treatment. Counseled patient on indications and directions for administration for hyoscyamine SL, acetaminophen liquid, ondansetron ODT, and omeprazole DR. While on percocet, patient was informed not to exceed more than 6 doses of percocet and acetaminophen liquid in a day (<3g/day). Patient was instructed to take the medications as follows:    - Discontinue meloxicam as it may increase risk of GI bleed  - Crush gabapentin and methocarbamol; Percocet may also be crushed but pain management will be re-evaluated post-op.  - Continue Saxenda, refresh tears, chewable vitamins & supplements, and miralax as needed  - Switch metoprolol succinate to tartrate 25 mg daily (Discussed with PCP Dr. Almaraz)    Tammy Herman, PharmD  PGY2 Pharmacotherapy Resident   [id number]

## 2020-12-15 ENCOUNTER — APPOINTMENT (OUTPATIENT)
Dept: SURGERY | Facility: HOSPITAL | Age: 60
End: 2020-12-15
Payer: COMMERCIAL

## 2020-12-15 ENCOUNTER — INPATIENT (INPATIENT)
Facility: HOSPITAL | Age: 60
LOS: 0 days | Discharge: HOME | DRG: 621 | End: 2020-12-16
Attending: SURGERY | Admitting: SURGERY
Payer: COMMERCIAL

## 2020-12-15 ENCOUNTER — RESULT REVIEW (OUTPATIENT)
Age: 60
End: 2020-12-15

## 2020-12-15 VITALS
HEIGHT: 66 IN | OXYGEN SATURATION: 99 % | TEMPERATURE: 98 F | DIASTOLIC BLOOD PRESSURE: 89 MMHG | HEART RATE: 90 BPM | RESPIRATION RATE: 16 BRPM | SYSTOLIC BLOOD PRESSURE: 152 MMHG | WEIGHT: 266.1 LBS

## 2020-12-15 DIAGNOSIS — Z98.890 OTHER SPECIFIED POSTPROCEDURAL STATES: Chronic | ICD-10-CM

## 2020-12-15 DIAGNOSIS — Z87.19 PERSONAL HISTORY OF OTHER DISEASES OF THE DIGESTIVE SYSTEM: Chronic | ICD-10-CM

## 2020-12-15 DIAGNOSIS — Z98.1 ARTHRODESIS STATUS: Chronic | ICD-10-CM

## 2020-12-15 DIAGNOSIS — E66.01 MORBID (SEVERE) OBESITY DUE TO EXCESS CALORIES: ICD-10-CM

## 2020-12-15 LAB
ANION GAP SERPL CALC-SCNC: 13 MMOL/L — SIGNIFICANT CHANGE UP (ref 5–17)
BASOPHILS # BLD AUTO: 0.04 K/UL — SIGNIFICANT CHANGE UP (ref 0–0.2)
BASOPHILS NFR BLD AUTO: 0.4 % — SIGNIFICANT CHANGE UP (ref 0–2)
BUN SERPL-MCNC: 7 MG/DL — SIGNIFICANT CHANGE UP (ref 7–23)
CALCIUM SERPL-MCNC: 9.2 MG/DL — SIGNIFICANT CHANGE UP (ref 8.4–10.5)
CHLORIDE SERPL-SCNC: 106 MMOL/L — SIGNIFICANT CHANGE UP (ref 96–108)
CO2 SERPL-SCNC: 24 MMOL/L — SIGNIFICANT CHANGE UP (ref 22–31)
CREAT SERPL-MCNC: 0.8 MG/DL — SIGNIFICANT CHANGE UP (ref 0.5–1.3)
EOSINOPHIL # BLD AUTO: 0.07 K/UL — SIGNIFICANT CHANGE UP (ref 0–0.5)
EOSINOPHIL NFR BLD AUTO: 0.8 % — SIGNIFICANT CHANGE UP (ref 0–6)
GLUCOSE BLDC GLUCOMTR-MCNC: 133 MG/DL — HIGH (ref 70–99)
GLUCOSE SERPL-MCNC: 172 MG/DL — HIGH (ref 70–99)
HCT VFR BLD CALC: 41.7 % — SIGNIFICANT CHANGE UP (ref 34.5–45)
HGB BLD-MCNC: 13 G/DL — SIGNIFICANT CHANGE UP (ref 11.5–15.5)
IMM GRANULOCYTES NFR BLD AUTO: 0.5 % — SIGNIFICANT CHANGE UP (ref 0–1.5)
LYMPHOCYTES # BLD AUTO: 1.84 K/UL — SIGNIFICANT CHANGE UP (ref 1–3.3)
LYMPHOCYTES # BLD AUTO: 19.7 % — SIGNIFICANT CHANGE UP (ref 13–44)
MAGNESIUM SERPL-MCNC: 2.1 MG/DL — SIGNIFICANT CHANGE UP (ref 1.6–2.6)
MCHC RBC-ENTMCNC: 26.5 PG — LOW (ref 27–34)
MCHC RBC-ENTMCNC: 31.2 GM/DL — LOW (ref 32–36)
MCV RBC AUTO: 84.9 FL — SIGNIFICANT CHANGE UP (ref 80–100)
MONOCYTES # BLD AUTO: 0.38 K/UL — SIGNIFICANT CHANGE UP (ref 0–0.9)
MONOCYTES NFR BLD AUTO: 4.1 % — SIGNIFICANT CHANGE UP (ref 2–14)
NEUTROPHILS # BLD AUTO: 6.95 K/UL — SIGNIFICANT CHANGE UP (ref 1.8–7.4)
NEUTROPHILS NFR BLD AUTO: 74.5 % — SIGNIFICANT CHANGE UP (ref 43–77)
NRBC # BLD: 0 /100 WBCS — SIGNIFICANT CHANGE UP (ref 0–0)
PHOSPHATE SERPL-MCNC: 3.5 MG/DL — SIGNIFICANT CHANGE UP (ref 2.5–4.5)
PLATELET # BLD AUTO: 231 K/UL — SIGNIFICANT CHANGE UP (ref 150–400)
POTASSIUM SERPL-MCNC: 4.1 MMOL/L — SIGNIFICANT CHANGE UP (ref 3.5–5.3)
POTASSIUM SERPL-SCNC: 4.1 MMOL/L — SIGNIFICANT CHANGE UP (ref 3.5–5.3)
RBC # BLD: 4.91 M/UL — SIGNIFICANT CHANGE UP (ref 3.8–5.2)
RBC # FLD: 13.5 % — SIGNIFICANT CHANGE UP (ref 10.3–14.5)
SODIUM SERPL-SCNC: 143 MMOL/L — SIGNIFICANT CHANGE UP (ref 135–145)
WBC # BLD: 9.33 K/UL — SIGNIFICANT CHANGE UP (ref 3.8–10.5)
WBC # FLD AUTO: 9.33 K/UL — SIGNIFICANT CHANGE UP (ref 3.8–10.5)

## 2020-12-15 PROCEDURE — 43775 LAP SLEEVE GASTRECTOMY: CPT

## 2020-12-15 PROCEDURE — 88305 TISSUE EXAM BY PATHOLOGIST: CPT | Mod: 26

## 2020-12-15 RX ORDER — DIPHENHYDRAMINE HCL 50 MG
12.5 CAPSULE ORAL ONCE
Refills: 0 | Status: COMPLETED | OUTPATIENT
Start: 2020-12-15 | End: 2020-12-15

## 2020-12-15 RX ORDER — HYDROMORPHONE HYDROCHLORIDE 2 MG/ML
0.25 INJECTION INTRAMUSCULAR; INTRAVENOUS; SUBCUTANEOUS
Refills: 0 | Status: DISCONTINUED | OUTPATIENT
Start: 2020-12-15 | End: 2020-12-15

## 2020-12-15 RX ORDER — ONDANSETRON 8 MG/1
4 TABLET, FILM COATED ORAL EVERY 6 HOURS
Refills: 0 | Status: DISCONTINUED | OUTPATIENT
Start: 2020-12-15 | End: 2020-12-16

## 2020-12-15 RX ORDER — SODIUM CHLORIDE 9 MG/ML
1000 INJECTION, SOLUTION INTRAVENOUS
Refills: 0 | Status: DISCONTINUED | OUTPATIENT
Start: 2020-12-15 | End: 2020-12-16

## 2020-12-15 RX ORDER — HEPARIN SODIUM 5000 [USP'U]/ML
5000 INJECTION INTRAVENOUS; SUBCUTANEOUS ONCE
Refills: 0 | Status: COMPLETED | OUTPATIENT
Start: 2020-12-15 | End: 2020-12-15

## 2020-12-15 RX ORDER — PANTOPRAZOLE SODIUM 20 MG/1
40 TABLET, DELAYED RELEASE ORAL EVERY 24 HOURS
Refills: 0 | Status: DISCONTINUED | OUTPATIENT
Start: 2020-12-15 | End: 2020-12-16

## 2020-12-15 RX ORDER — HYDROMORPHONE HYDROCHLORIDE 2 MG/ML
0.5 INJECTION INTRAMUSCULAR; INTRAVENOUS; SUBCUTANEOUS
Refills: 0 | Status: DISCONTINUED | OUTPATIENT
Start: 2020-12-15 | End: 2020-12-15

## 2020-12-15 RX ORDER — KETOROLAC TROMETHAMINE 30 MG/ML
30 SYRINGE (ML) INJECTION EVERY 6 HOURS
Refills: 0 | Status: DISCONTINUED | OUTPATIENT
Start: 2020-12-15 | End: 2020-12-16

## 2020-12-15 RX ORDER — ACETAMINOPHEN 500 MG
1000 TABLET ORAL ONCE
Refills: 0 | Status: COMPLETED | OUTPATIENT
Start: 2020-12-16 | End: 2020-12-16

## 2020-12-15 RX ORDER — ACETAMINOPHEN 500 MG
1000 TABLET ORAL ONCE
Refills: 0 | Status: COMPLETED | OUTPATIENT
Start: 2020-12-15 | End: 2020-12-15

## 2020-12-15 RX ORDER — HALOPERIDOL DECANOATE 100 MG/ML
0.5 INJECTION INTRAMUSCULAR EVERY 4 HOURS
Refills: 0 | Status: DISCONTINUED | OUTPATIENT
Start: 2020-12-15 | End: 2020-12-16

## 2020-12-15 RX ORDER — METOPROLOL TARTRATE 50 MG
5 TABLET ORAL EVERY 6 HOURS
Refills: 0 | Status: DISCONTINUED | OUTPATIENT
Start: 2020-12-15 | End: 2020-12-16

## 2020-12-15 RX ORDER — HYDRALAZINE HCL 50 MG
5 TABLET ORAL ONCE
Refills: 0 | Status: COMPLETED | OUTPATIENT
Start: 2020-12-15 | End: 2020-12-15

## 2020-12-15 RX ORDER — HEPARIN SODIUM 5000 [USP'U]/ML
5000 INJECTION INTRAVENOUS; SUBCUTANEOUS EVERY 8 HOURS
Refills: 0 | Status: DISCONTINUED | OUTPATIENT
Start: 2020-12-15 | End: 2020-12-16

## 2020-12-15 RX ORDER — ONDANSETRON 8 MG/1
4 TABLET, FILM COATED ORAL ONCE
Refills: 0 | Status: COMPLETED | OUTPATIENT
Start: 2020-12-15 | End: 2020-12-15

## 2020-12-15 RX ORDER — THIAMINE MONONITRATE (VIT B1) 100 MG
100 TABLET ORAL ONCE
Refills: 0 | Status: COMPLETED | OUTPATIENT
Start: 2020-12-15 | End: 2020-12-15

## 2020-12-15 RX ORDER — FENTANYL CITRATE 50 UG/ML
50 INJECTION INTRAVENOUS
Refills: 0 | Status: DISCONTINUED | OUTPATIENT
Start: 2020-12-15 | End: 2020-12-15

## 2020-12-15 RX ORDER — FAMOTIDINE 10 MG/ML
20 INJECTION INTRAVENOUS ONCE
Refills: 0 | Status: COMPLETED | OUTPATIENT
Start: 2020-12-15 | End: 2020-12-15

## 2020-12-15 RX ORDER — CEFAZOLIN SODIUM 1 G
3000 VIAL (EA) INJECTION EVERY 8 HOURS
Refills: 0 | Status: COMPLETED | OUTPATIENT
Start: 2020-12-15 | End: 2020-12-15

## 2020-12-15 RX ORDER — FENTANYL CITRATE 50 UG/ML
25 INJECTION INTRAVENOUS
Refills: 0 | Status: DISCONTINUED | OUTPATIENT
Start: 2020-12-15 | End: 2020-12-15

## 2020-12-15 RX ORDER — FOLIC ACID 0.8 MG
1 TABLET ORAL ONCE
Refills: 0 | Status: COMPLETED | OUTPATIENT
Start: 2020-12-15 | End: 2020-12-15

## 2020-12-15 RX ORDER — LIDOCAINE HCL 20 MG/ML
0.2 VIAL (ML) INJECTION ONCE
Refills: 0 | Status: DISCONTINUED | OUTPATIENT
Start: 2020-12-15 | End: 2020-12-15

## 2020-12-15 RX ORDER — SODIUM CHLORIDE 9 MG/ML
3 INJECTION INTRAMUSCULAR; INTRAVENOUS; SUBCUTANEOUS EVERY 8 HOURS
Refills: 0 | Status: DISCONTINUED | OUTPATIENT
Start: 2020-12-15 | End: 2020-12-15

## 2020-12-15 RX ORDER — CHLORHEXIDINE GLUCONATE 213 G/1000ML
1 SOLUTION TOPICAL ONCE
Refills: 0 | Status: DISCONTINUED | OUTPATIENT
Start: 2020-12-15 | End: 2020-12-15

## 2020-12-15 RX ORDER — HYOSCYAMINE SULFATE 0.13 MG
0.12 TABLET ORAL EVERY 6 HOURS
Refills: 0 | Status: DISCONTINUED | OUTPATIENT
Start: 2020-12-15 | End: 2020-12-16

## 2020-12-15 RX ADMIN — HYDROMORPHONE HYDROCHLORIDE 0.25 MILLIGRAM(S): 2 INJECTION INTRAMUSCULAR; INTRAVENOUS; SUBCUTANEOUS at 11:40

## 2020-12-15 RX ADMIN — ONDANSETRON 4 MILLIGRAM(S): 8 TABLET, FILM COATED ORAL at 12:28

## 2020-12-15 RX ADMIN — Medication 5 MILLIGRAM(S): at 17:35

## 2020-12-15 RX ADMIN — HEPARIN SODIUM 5000 UNIT(S): 5000 INJECTION INTRAVENOUS; SUBCUTANEOUS at 21:26

## 2020-12-15 RX ADMIN — SODIUM CHLORIDE 250 MILLILITER(S): 9 INJECTION, SOLUTION INTRAVENOUS at 17:33

## 2020-12-15 RX ADMIN — Medication 1000 MILLIGRAM(S): at 00:00

## 2020-12-15 RX ADMIN — Medication 200 MILLIGRAM(S): at 18:16

## 2020-12-15 RX ADMIN — Medication 30 MILLIGRAM(S): at 00:00

## 2020-12-15 RX ADMIN — SODIUM CHLORIDE 150 MILLILITER(S): 9 INJECTION, SOLUTION INTRAVENOUS at 21:24

## 2020-12-15 RX ADMIN — Medication 1 MILLIGRAM(S): at 17:35

## 2020-12-15 RX ADMIN — Medication 400 MILLIGRAM(S): at 16:00

## 2020-12-15 RX ADMIN — Medication 200 MILLIGRAM(S): at 23:53

## 2020-12-15 RX ADMIN — Medication 100 MILLIGRAM(S): at 17:33

## 2020-12-15 RX ADMIN — Medication 5 MILLIGRAM(S): at 12:25

## 2020-12-15 RX ADMIN — HYDROMORPHONE HYDROCHLORIDE 0.25 MILLIGRAM(S): 2 INJECTION INTRAMUSCULAR; INTRAVENOUS; SUBCUTANEOUS at 11:55

## 2020-12-15 RX ADMIN — HEPARIN SODIUM 5000 UNIT(S): 5000 INJECTION INTRAVENOUS; SUBCUTANEOUS at 06:46

## 2020-12-15 RX ADMIN — Medication 0.12 MILLIGRAM(S): at 18:18

## 2020-12-15 RX ADMIN — ONDANSETRON 4 MILLIGRAM(S): 8 TABLET, FILM COATED ORAL at 17:33

## 2020-12-15 RX ADMIN — Medication 0.12 MILLIGRAM(S): at 23:54

## 2020-12-15 RX ADMIN — Medication 200 MILLIGRAM(S): at 17:33

## 2020-12-15 RX ADMIN — Medication 400 MILLIGRAM(S): at 21:24

## 2020-12-15 RX ADMIN — Medication 1000 MILLIGRAM(S): at 21:45

## 2020-12-15 RX ADMIN — Medication 12.5 MILLIGRAM(S): at 13:28

## 2020-12-15 RX ADMIN — Medication 30 MILLIGRAM(S): at 17:33

## 2020-12-15 RX ADMIN — ONDANSETRON 4 MILLIGRAM(S): 8 TABLET, FILM COATED ORAL at 23:53

## 2020-12-15 RX ADMIN — Medication 5 MILLIGRAM(S): at 23:53

## 2020-12-15 RX ADMIN — Medication 5 MILLIGRAM(S): at 13:45

## 2020-12-15 RX ADMIN — SODIUM CHLORIDE 150 MILLILITER(S): 9 INJECTION, SOLUTION INTRAVENOUS at 13:57

## 2020-12-15 RX ADMIN — FAMOTIDINE 20 MILLIGRAM(S): 10 INJECTION INTRAVENOUS at 13:15

## 2020-12-15 RX ADMIN — SODIUM CHLORIDE 250 MILLILITER(S): 9 INJECTION, SOLUTION INTRAVENOUS at 16:05

## 2020-12-15 RX ADMIN — FENTANYL CITRATE 25 MICROGRAM(S): 50 INJECTION INTRAVENOUS at 11:25

## 2020-12-15 RX ADMIN — PANTOPRAZOLE SODIUM 40 MILLIGRAM(S): 20 TABLET, DELAYED RELEASE ORAL at 11:35

## 2020-12-15 RX ADMIN — Medication 0.12 MILLIGRAM(S): at 11:40

## 2020-12-15 RX ADMIN — ONDANSETRON 4 MILLIGRAM(S): 8 TABLET, FILM COATED ORAL at 11:35

## 2020-12-15 RX ADMIN — Medication 30 MILLIGRAM(S): at 23:53

## 2020-12-15 RX ADMIN — Medication 5 MILLIGRAM(S): at 12:45

## 2020-12-15 RX ADMIN — HEPARIN SODIUM 5000 UNIT(S): 5000 INJECTION INTRAVENOUS; SUBCUTANEOUS at 13:56

## 2020-12-15 RX ADMIN — FENTANYL CITRATE 25 MICROGRAM(S): 50 INJECTION INTRAVENOUS at 11:10

## 2020-12-15 NOTE — CHART NOTE - NSCHARTNOTEFT_GEN_A_CORE
POST-OPERATIVE NOTE    Patient is s/p laparoscopic sleeve gastrectomy.    Subjective:  Pt sleeping upon arrival and somnolent. Arousable to appropriately answer yes and no questions  Patient reports pain at the incisional site, controlled with medication  Denies chest pain, shortness of breath, vomiting. Endorses persistent nausea  Is not yet passing gas or having bowel movements  Not yet urinating independently or ambulating independently    Vital Signs Last 24 Hrs  T(C): 36.6 (15 Dec 2020 10:50), Max: 36.6 (15 Dec 2020 10:50)  T(F): 97.9 (15 Dec 2020 10:50), Max: 97.9 (15 Dec 2020 10:50)  HR: 56 (15 Dec 2020 13:45) (55 - 90)  BP: 177/87 (15 Dec 2020 13:45) (152/89 - 199/89)  BP(mean): 124 (15 Dec 2020 13:30) (118 - 134)  RR: 18 (15 Dec 2020 13:45) (16 - 18)  SpO2: 100% (15 Dec 2020 13:45) (99% - 100%)      I&O's Detail    ceFAZolin   IVPB 3000  ceFAZolin   IVPB 3000  ceFAZolin   IVPB 3000  ceFAZolin   IVPB 3000  heparin   Injectable 5000  metoprolol tartrate Injectable 5    PAST MEDICAL & SURGICAL HISTORY:  OA (osteoarthritis)  bilateral knees    T2DM (type 2 diabetes mellitus)    Obesity  BMI 43.0    HTN (hypertension)    WILLAM (obstructive sleep apnea)  dx 12/2/20, recommendation CPAP at night, pt has not started treatment yet    H/O endoscopy  8/13/20    H/O colonoscopy  11/25/20    History of rectal abscess  drainage 2012    S/P arthroscopy of knee  right 2016    S/P lumbar spinal fusion  2018 +hardware          Physical Exam:  General: NAD, resting comfortably in bed  Pulmonary: Nonlabored breathing, no respiratory distress, CTAB  Cardiovascular: NSR, no murmurs or rubs  Abdominal: soft, appropriately tender in LUQ/RUQ, slightly distended. Lap incisions x5 CDI and dressed with gauze and tape.  Extremities: WWP, b/l LE swelling      LABS:                        13.0   9.33  )-----------( 231      ( 15 Dec 2020 11:45 )             41.7     12-15    143  |  106  |  7   ----------------------------<  172<H>  4.1   |  24  |  0.80    Ca    9.2      15 Dec 2020 11:45  Phos  3.5     12-15  Mg     2.1     12-15        CAPILLARY BLOOD GLUCOSE      POCT Blood Glucose.: 133 mg/dL (15 Dec 2020 06:55)      Radiology and Additional Studies:    Assessment:  The patient is a 60y Female who is now 3 hours post-op from a lap sleeve gastrectomy.    Plan:  - Pain control as needed, continue Tylenol, Toradol, Dilaudid  - Ancef q8 x2 doses  - NPO, will reassess nausea in 3 hours for poss. advance to CLD  - Zofran, PRN Haldol for nausea  - TOV by 1500  - b/l LE swelling, Duplex negative for DVT  - DVT ppx  - OOB and ambulating as tolerated  - F/u AM labs    [facility]  [id number]

## 2020-12-15 NOTE — PROVIDER CONTACT NOTE (OTHER) - ASSESSMENT
pt resting in bed. no c/o pain. pt c/o nausea. pt started bariatric clears this evening. all other VSS.

## 2020-12-15 NOTE — BRIEF OPERATIVE NOTE - OPERATION/FINDINGS
laparoscopic sleeve gastrectomy   fundus fully mobilized, small bleeding from GJ and liver. stapled with black x2, purple x4. clips to distal staple line, vistaseal to staple line.

## 2020-12-16 ENCOUNTER — TRANSCRIPTION ENCOUNTER (OUTPATIENT)
Age: 60
End: 2020-12-16

## 2020-12-16 VITALS
SYSTOLIC BLOOD PRESSURE: 152 MMHG | RESPIRATION RATE: 19 BRPM | HEART RATE: 60 BPM | TEMPERATURE: 99 F | DIASTOLIC BLOOD PRESSURE: 71 MMHG | OXYGEN SATURATION: 99 %

## 2020-12-16 LAB
ANION GAP SERPL CALC-SCNC: 14 MMOL/L — SIGNIFICANT CHANGE UP (ref 5–17)
BASOPHILS # BLD AUTO: 0.03 K/UL — SIGNIFICANT CHANGE UP (ref 0–0.2)
BASOPHILS NFR BLD AUTO: 0.3 % — SIGNIFICANT CHANGE UP (ref 0–2)
BUN SERPL-MCNC: 8 MG/DL — SIGNIFICANT CHANGE UP (ref 7–23)
CALCIUM SERPL-MCNC: 9.7 MG/DL — SIGNIFICANT CHANGE UP (ref 8.4–10.5)
CHLORIDE SERPL-SCNC: 101 MMOL/L — SIGNIFICANT CHANGE UP (ref 96–108)
CO2 SERPL-SCNC: 26 MMOL/L — SIGNIFICANT CHANGE UP (ref 22–31)
CREAT SERPL-MCNC: 0.91 MG/DL — SIGNIFICANT CHANGE UP (ref 0.5–1.3)
EOSINOPHIL # BLD AUTO: 0 K/UL — SIGNIFICANT CHANGE UP (ref 0–0.5)
EOSINOPHIL NFR BLD AUTO: 0 % — SIGNIFICANT CHANGE UP (ref 0–6)
GLUCOSE BLDC GLUCOMTR-MCNC: 123 MG/DL — HIGH (ref 70–99)
GLUCOSE BLDC GLUCOMTR-MCNC: 135 MG/DL — HIGH (ref 70–99)
GLUCOSE SERPL-MCNC: 114 MG/DL — HIGH (ref 70–99)
HCT VFR BLD CALC: 39.9 % — SIGNIFICANT CHANGE UP (ref 34.5–45)
HGB BLD-MCNC: 12.6 G/DL — SIGNIFICANT CHANGE UP (ref 11.5–15.5)
IMM GRANULOCYTES NFR BLD AUTO: 0.5 % — SIGNIFICANT CHANGE UP (ref 0–1.5)
LYMPHOCYTES # BLD AUTO: 2.1 K/UL — SIGNIFICANT CHANGE UP (ref 1–3.3)
LYMPHOCYTES # BLD AUTO: 22.6 % — SIGNIFICANT CHANGE UP (ref 13–44)
MCHC RBC-ENTMCNC: 26.5 PG — LOW (ref 27–34)
MCHC RBC-ENTMCNC: 31.6 GM/DL — LOW (ref 32–36)
MCV RBC AUTO: 84 FL — SIGNIFICANT CHANGE UP (ref 80–100)
MONOCYTES # BLD AUTO: 0.66 K/UL — SIGNIFICANT CHANGE UP (ref 0–0.9)
MONOCYTES NFR BLD AUTO: 7.1 % — SIGNIFICANT CHANGE UP (ref 2–14)
NEUTROPHILS # BLD AUTO: 6.45 K/UL — SIGNIFICANT CHANGE UP (ref 1.8–7.4)
NEUTROPHILS NFR BLD AUTO: 69.5 % — SIGNIFICANT CHANGE UP (ref 43–77)
NRBC # BLD: 0 /100 WBCS — SIGNIFICANT CHANGE UP (ref 0–0)
PLATELET # BLD AUTO: 266 K/UL — SIGNIFICANT CHANGE UP (ref 150–400)
POTASSIUM SERPL-MCNC: 4.2 MMOL/L — SIGNIFICANT CHANGE UP (ref 3.5–5.3)
POTASSIUM SERPL-SCNC: 4.2 MMOL/L — SIGNIFICANT CHANGE UP (ref 3.5–5.3)
RBC # BLD: 4.75 M/UL — SIGNIFICANT CHANGE UP (ref 3.8–5.2)
RBC # FLD: 13.5 % — SIGNIFICANT CHANGE UP (ref 10.3–14.5)
SODIUM SERPL-SCNC: 141 MMOL/L — SIGNIFICANT CHANGE UP (ref 135–145)
WBC # BLD: 9.29 K/UL — SIGNIFICANT CHANGE UP (ref 3.8–10.5)
WBC # FLD AUTO: 9.29 K/UL — SIGNIFICANT CHANGE UP (ref 3.8–10.5)

## 2020-12-16 PROCEDURE — 86900 BLOOD TYPING SEROLOGIC ABO: CPT

## 2020-12-16 PROCEDURE — 84100 ASSAY OF PHOSPHORUS: CPT

## 2020-12-16 PROCEDURE — 71045 X-RAY EXAM CHEST 1 VIEW: CPT

## 2020-12-16 PROCEDURE — 88305 TISSUE EXAM BY PATHOLOGIST: CPT

## 2020-12-16 PROCEDURE — 80048 BASIC METABOLIC PNL TOTAL CA: CPT

## 2020-12-16 PROCEDURE — 86901 BLOOD TYPING SEROLOGIC RH(D): CPT

## 2020-12-16 PROCEDURE — 82962 GLUCOSE BLOOD TEST: CPT

## 2020-12-16 PROCEDURE — C1889: CPT

## 2020-12-16 PROCEDURE — C9399: CPT

## 2020-12-16 PROCEDURE — 86850 RBC ANTIBODY SCREEN: CPT

## 2020-12-16 PROCEDURE — 93970 EXTREMITY STUDY: CPT | Mod: 26

## 2020-12-16 PROCEDURE — 83735 ASSAY OF MAGNESIUM: CPT

## 2020-12-16 PROCEDURE — 71045 X-RAY EXAM CHEST 1 VIEW: CPT | Mod: 26

## 2020-12-16 PROCEDURE — 93970 EXTREMITY STUDY: CPT

## 2020-12-16 PROCEDURE — 85025 COMPLETE CBC W/AUTO DIFF WBC: CPT

## 2020-12-16 RX ORDER — BENZOCAINE AND MENTHOL 5; 1 G/100ML; G/100ML
1 LIQUID ORAL ONCE
Refills: 0 | Status: DISCONTINUED | OUTPATIENT
Start: 2020-12-16 | End: 2020-12-16

## 2020-12-16 RX ORDER — HYOSCYAMINE SULFATE 0.13 MG
1 TABLET ORAL
Qty: 28 | Refills: 0
Start: 2020-12-16 | End: 2020-12-22

## 2020-12-16 RX ORDER — ONDANSETRON 8 MG/1
1 TABLET, FILM COATED ORAL
Qty: 28 | Refills: 0
Start: 2020-12-16 | End: 2020-12-22

## 2020-12-16 RX ORDER — SIMETHICONE 80 MG/1
80 TABLET, CHEWABLE ORAL
Refills: 0 | Status: DISCONTINUED | OUTPATIENT
Start: 2020-12-16 | End: 2020-12-16

## 2020-12-16 RX ORDER — SIMETHICONE 80 MG/1
1 TABLET, CHEWABLE ORAL
Qty: 0 | Refills: 0 | DISCHARGE
Start: 2020-12-16

## 2020-12-16 RX ORDER — ACETAMINOPHEN 500 MG
15 TABLET ORAL
Qty: 300 | Refills: 0
Start: 2020-12-16 | End: 2020-12-20

## 2020-12-16 RX ORDER — POLYETHYLENE GLYCOL 3350 17 G/17G
1 POWDER, FOR SOLUTION ORAL
Qty: 0 | Refills: 0 | DISCHARGE

## 2020-12-16 RX ORDER — OMEPRAZOLE 10 MG/1
1 CAPSULE, DELAYED RELEASE ORAL
Qty: 30 | Refills: 0
Start: 2020-12-16 | End: 2021-01-14

## 2020-12-16 RX ADMIN — Medication 5 MILLIGRAM(S): at 11:17

## 2020-12-16 RX ADMIN — Medication 5 MILLIGRAM(S): at 06:15

## 2020-12-16 RX ADMIN — Medication 0.12 MILLIGRAM(S): at 06:14

## 2020-12-16 RX ADMIN — Medication 400 MILLIGRAM(S): at 06:15

## 2020-12-16 RX ADMIN — Medication 30 MILLIGRAM(S): at 06:15

## 2020-12-16 RX ADMIN — HEPARIN SODIUM 5000 UNIT(S): 5000 INJECTION INTRAVENOUS; SUBCUTANEOUS at 06:14

## 2020-12-16 RX ADMIN — Medication 30 MILLIGRAM(S): at 00:25

## 2020-12-16 RX ADMIN — SIMETHICONE 80 MILLIGRAM(S): 80 TABLET, CHEWABLE ORAL at 11:32

## 2020-12-16 RX ADMIN — Medication 0.12 MILLIGRAM(S): at 11:17

## 2020-12-16 RX ADMIN — ONDANSETRON 4 MILLIGRAM(S): 8 TABLET, FILM COATED ORAL at 06:17

## 2020-12-16 RX ADMIN — HEPARIN SODIUM 5000 UNIT(S): 5000 INJECTION INTRAVENOUS; SUBCUTANEOUS at 14:29

## 2020-12-16 RX ADMIN — Medication 30 MILLIGRAM(S): at 06:45

## 2020-12-16 RX ADMIN — ONDANSETRON 4 MILLIGRAM(S): 8 TABLET, FILM COATED ORAL at 11:32

## 2020-12-16 RX ADMIN — Medication 1000 MILLIGRAM(S): at 06:45

## 2020-12-16 RX ADMIN — PANTOPRAZOLE SODIUM 40 MILLIGRAM(S): 20 TABLET, DELAYED RELEASE ORAL at 11:17

## 2020-12-16 NOTE — DISCHARGE NOTE PROVIDER - CARE PROVIDER_API CALL
Didier Small  SURGERY  [address] [place] [address], [facility] [address]  [place], [place] [id number]  Phone: [phone]  Fax: [phone]  Follow Up Time:

## 2020-12-16 NOTE — DISCHARGE NOTE PROVIDER - INSTRUCTIONS
Start bariatric phase 1 full liquid diet starting tomorrow for 2 weeks, then thin puree soft for 4 weeks. no carbonated beverage, follow up with your dietician in 30 days.

## 2020-12-16 NOTE — DISCHARGE NOTE PROVIDER - NSRESEARCHGRANT_CONTRAREASONSOTHERFT_GEN_A_CORE
[facility] VTE Risk Stratification Guideline used  to determine post D/C treatment

## 2020-12-16 NOTE — PROGRESS NOTE ADULT - ATTENDING COMMENTS
I have seen and evaluated the patient and discussed the relevant clinical findings and plan with the surgical housestaff and fellow.  Agree with the above documentation with addenda as noted.     Didier Small MD

## 2020-12-16 NOTE — PROGRESS NOTE ADULT - ASSESSMENT
60y Female s/p lap sleeve gastrectomy on 12/15.    Plan:  - Pain control as needed, continue Tylenol, Toradol, Dilaudid  - CLD  - Zofran, PRN Haldol for nausea  - b/l LE swelling, Duplex negative for DVT  - DVT ppx  - OOB and ambulating as tolerated  - F/u AM labs  - Plans to be discussed with attendings    [facility]  [id number].

## 2020-12-16 NOTE — DISCHARGE NOTE PROVIDER - HOSPITAL COURSE
Ms. Morgan who has a history of T2DM, HTN, WILLAM, knee and back pain, lower extremity edema, and morbid obesity BMI 43,underwent Laparoscopic Sleeve Gastrectomy on November 15, 2020. Bariatric ERAS protocol followed to include preoperative and perioperative use of DVT and SSI prophylaxis as well as multi-modal non-opioid analgesics. Patient tolerated the procedure well extubated in the operating room then transferred to the PACU in stable condition. Once hemodynamically stable and effective pain control the patient was able to ambulate with assistance. Pt was also able to void within 4 hours following the procedure. The patient was later transferred to a bariatric unit and placed on bedside continuous pulse oximetry. Pain management included IV multi-modal non-opioid analgesia then transitioned to liquid as needed. The patient began bariatric clear diet evening of surgery.     On POD #1 she complained of gas pain, denies cardio-pulmonary acute events as well as denies nausea and vomiting,. She ambulated independently around the unit and voiding as expected. B/L lower extremity duplex obtained, negative for DVT. The rest of the hospital course was uneventful, patient met 8-Point Bariatric Surgery D/C criteria and subsequently cleared for discharge home on POD#1. No post discharge extended VTE prophylaxis, [facility] VTE Risk Stratification low (<1%). The patient will follow a protocol-derived staged meal progression supervised by the dietitian in the outpatient setting. Patient will follow-up with Dr. Small in 7-10 days, medical doctor and dietitian in 30 days. [facility] prior to [facility]. Written discharge instruction explained and given to include VTE prevention.

## 2020-12-16 NOTE — DISCHARGE NOTE PROVIDER - NSDCMRMEDTOKEN_GEN_ALL_CORE_FT
acetaminophen 500 mg/15 mL oral liquid: 15 milliliter(s) orally every 6 hours, As Needed   gabapentin 800 mg oral tablet: 1 tab(s) orally 3 times a day - crush  hyoscyamine 0.125 mg sublingual tablet: 1 tab(s) sublingual every 6 hours, As Needed MDD:4   meloxicam 15 mg oral tablet: 1 tab(s) orally once a day - discontinue  methocarbamol 500 mg oral tablet: 1 tab(s) orally 2 times a day- crush  metoprolol succinate 25 mg oral tablet, extended release: 1 tab(s) orally once a day in am - switched to tartrate  omeprazole 40 mg oral delayed release capsule: 1 cap(s) orally once a day MDD:1  ondansetron 4 mg oral tablet, disintegratin tab(s) orally every 6 hours, As Needed   Percocet 7.5/325 oral tablet: 1 tab(s) orally every 6 hours, As Needed - crush  PNV Prenatal oral tablet: 1 tab(s) orally once a day - switch to chewable  Refresh ophthalmic solution: 1 drop(s) to each affected eye 4 times a day, As Needed - continue  Saxenda 18 mg/3 mL subcutaneous solution: subcutaneous once a day in am - continue  simethicone 80 mg oral tablet, chewable: 1 tab(s) orally 4 times a day  Vitamin B-100 oral tablet: 1 tab(s) orally once a day - switch to chewable  Vitamin C 1000 mg oral tablet: 1 tab(s) orally once a day - switch to chewable  Vitamin D3 2000 intl units (50 mcg) oral tablet: 1 tab(s) orally once a day - switch to chewable  Zinc 140 mg (as elemental zinc 50 mg) oral tablet: 1 tab(s) orally once a day - discontinued by patient

## 2020-12-16 NOTE — DIETITIAN INITIAL EVALUATION ADULT. - ORAL INTAKE PTA/DIET HISTORY
Pt reports following a full liquid diet for 2 weeks PTA as instructed by outpatient RD. Pt reports having Premier Protein shakes, blended soup, sugar-free popsciles PTA. Confirms NKFA. Reports taking vitamin D 3, vitmain C, zinc, prenatal multivitamin.

## 2020-12-16 NOTE — PHARMACOTHERAPY INTERVENTION NOTE - COMMENTS
Spoke to patient about absorption issues with medications and the need to crush tablets or open capsules for the next 30 days. Reinforced discussion points from previous counseling. Medication cards indicating discharge medications indication and side effects were provided. Informed patient of new prescriptions sent to pharmacy. Patient questions and concerns were answered and addressed. Patient demonstrated understanding.    No pre-op opioids  Post-op opioids:    PACU:  MME     2 Dc: 0 MME  Home: 0 MME     Tammy Herman  PGY2 Pharmacotherapy Resident   [id number] Spoke to patient about absorption issues with medications and the need to crush tablets or open capsules for the next 30 days. Reinforced discussion points from previous counseling. Medication cards indicating discharge medications indication and side effects were provided. Informed patient of new prescriptions sent to pharmacy. Patient questions and concerns were answered and addressed. Patient demonstrated understanding.    No pre-op opioids  Post-op opioids:    PACU: 20 MME     2 Dc: 5 MME  Home: 0 MME     Tammy Herman  PGY2 Pharmacotherapy Resident   [id number] Spoke to patient about absorption issues with medications and the need to crush tablets or open capsules for the next 30 days. Reinforced discussion points from previous counseling. Medication cards indicating discharge medications indication and side effects were provided. Informed patient of new prescriptions sent to pharmacy. Patient questions and concerns were answered and addressed. Patient demonstrated understanding.    7-day pre-op opioids: 135 MME (PO oxycodone)  Post-op opioids:    PACU: 20 MME (IV Fentanyl and IV hydromorphone)    2 Dc: 5 MME (IV hydromorphone)  Home: 0 MME     Tammy Herman  PGY2 Pharmacotherapy Resident   [id number] Spoke to patient about absorption issues with medications and the need to crush tablets or open capsules for the next 30 days. Reinforced discussion points from previous counseling. Medication cards indicating discharge medications indication and side effects were provided. Informed patient of new prescriptions sent to pharmacy. Patient questions and concerns were answered and addressed. Patient demonstrated understanding.    7-day pre-op opioids: 135 MME (PO oxycodone)  Post-op opioids:    PACU: 12.5 MME (IV Fentanyl and IV hydromorphone)    2 Dc: 0 MME  Home: 0 MME     Tammy Herman  PGY2 Pharmacotherapy Resident   [id number]

## 2020-12-16 NOTE — DIETITIAN INITIAL EVALUATION ADULT. - REASON INDICATOR FOR ASSESSMENT
diet education, bariatric surgery Detail Level: Detailed Quality 226: Preventive Care And Screening: Tobacco Use: Screening And Cessation Intervention: Patient screened for tobacco use and is an ex/non-smoker Quality 130: Documentation Of Current Medications In The Medical Record: Current Medications Documented Quality 431: Preventive Care And Screening: Unhealthy Alcohol Use - Screening: Patient screened for unhealthy alcohol use using a single question and scores less than 2 times per year

## 2020-12-16 NOTE — DISCHARGE NOTE PROVIDER - NSDCCPCAREPLAN_GEN_ALL_CORE_FT
PRINCIPAL DISCHARGE DIAGNOSIS  Diagnosis: Obesity  Assessment and Plan of Treatment: BMI 43.0      SECONDARY DISCHARGE DIAGNOSES  Diagnosis: T2DM (type 2 diabetes mellitus)  Assessment and Plan of Treatment:

## 2020-12-16 NOTE — DISCHARGE NOTE NURSING/CASE MANAGEMENT/SOCIAL WORK - PATIENT PORTAL LINK FT
You can access the FollowMyHealth Patient Portal offered by [facility] by registering at the following website: [email or web address]. By joining [facility]’s FollowMyHealth portal, you will also be able to view your health information using other applications (apps) compatible with our system.

## 2020-12-16 NOTE — DIETITIAN INITIAL EVALUATION ADULT. - OTHER INFO
Pt seen for bariatric surgery consult on 2MON. Pt with multiple previous wt loss attempts per chart. Pt tried losing weight through diet and was unable to lose and maintain significant wt loss. Per chart, pt met with outpatient RD and weighed  253 pounds (3/23/20). Pre-surgical wt (H&P) noted as 266 pounds (12/9/20). Current wt of 265.5 pounds (12/15). Pt now S/P laparoscopic vertical sleeve gastrectomy. Pt denies N+V, repots some gas, sipping on bariatric clear liquids during RD visit. Pt with knowledge of bariatric full liquid diet and receptive to in depth review/reinforcement. Pt reports home stock of protein shakes with plans to purchase more. Pt reports purchasing the necessary vitamins/minerals in chewable/liquid/crushable form including a multivitamin with added elemental iron, calcium citrate with vitamin D, and sublingual B12. Pt was advised to take the multivitamin with elemental iron at least 2 hours apart from the calcium citrate with vitamin D for optimal absorption. Pt plans to schedule a follow up appointment with outpatient RD. Pt able to teach back all points discussed during interview.     1. Laparoscopic  sleeve gastrectomy recommendations reviewed/reinforced (discharge instruction handout references). Bariatric full liquid diet reviewed- sugar free, caffeine free, no carbonated beverages, low fat, no straws, no chewing gum, 6 small meals/day gradually increasing volume, and sipping beverages slowly, no water during meals- drink water 1 hour before or after meals, meeting hydration and protein needs. Discussed vitamin/mineral supplement compliance as discussed above. 2. RD to remain available to reinforce nutrition education as requested by patient/family/caregiver.

## 2020-12-16 NOTE — DISCHARGE NOTE PROVIDER - NSDCCPTREATMENT_GEN_ALL_CORE_FT
PRINCIPAL PROCEDURE  Procedure: Laparoscopic sleeve gastrectomy  Findings and Treatment: Lifestyle Change and dietary modification, analgesia, nutritional supplement, follow up, nutritonal supplement

## 2020-12-17 LAB — SURGICAL PATHOLOGY STUDY: SIGNIFICANT CHANGE UP

## 2020-12-17 NOTE — PROGRESS NOTE ADULT - SUBJECTIVE AND OBJECTIVE BOX
Green Team Surgery Daily Progress Note    Subjective:   Patient seen at bedside this AM. Reports feeling well, without complaints. Denies chest pain, SOB. Tolerating clears diet without N/V.     24h Events:   - s/p lap sleeve gastrectomy    Objective:  Vital Signs  T(C): 37.1 (12-15 @ 23:45), Max: 37.1 (12-15 @ 23:45)  HR: 64 (12-15 @ 23:45) (55 - 90)  BP: 132/77 (12-15 @ 23:45) (132/77 - 199/89)  RR: 19 (12-15 @ 23:45) (16 - 19)  SpO2: 99% (12-15 @ 23:45) (98% - 100%)  12-15-20 @ 07:01  -  12-16-20 @ 00:46  --------------------------------------------------------  IN:  Total IN: 0 mL    OUT:    Voided (mL): 1100 mL  Total OUT: 1100 mL    Total NET: -1100 mL          Physical Exam:  General: NAD, resting comfortably in bed  Pulmonary: Nonlabored breathing, no respiratory distress, CTAB  Cardiovascular: NSR, no murmurs or rubs  Abdominal: soft, appropriately tender in LUQ/RUQ, slightly distended. Lap incisions x5 CDI and dressed with gauze and tape.  Extremities: WWP, b/l LE swelling    LABS:                        13.0   9.33  )-----------( 231      ( 15 Dec 2020 11:45 )             41.7     12-15    143  |  106  |  7   ----------------------------<  172<H>  4.1   |  24  |  0.80    Ca    9.2      15 Dec 2020 11:45  Phos  3.5     12-15  Mg     2.1     12-15            CAPILLARY BLOOD GLUCOSE      POCT Blood Glucose.: 133 mg/dL (15 Dec 2020 06:55)      Medications:   MEDICATIONS  (STANDING):  acetaminophen  IVPB .. 1000 milliGRAM(s) IV Intermittent once  heparin   Injectable 5000 Unit(s) SubCutaneous every 8 hours  hyoscyamine SL 0.125 milliGRAM(s) SubLingual every 6 hours  ketorolac   Injectable 30 milliGRAM(s) IV Push every 6 hours  lactated ringers. 1000 milliLiter(s) (150 mL/Hr) IV Continuous <Continuous>  metoprolol tartrate Injectable 5 milliGRAM(s) IV Push every 6 hours  ondansetron Injectable 4 milliGRAM(s) IV Push every 6 hours  pantoprazole  Injectable 40 milliGRAM(s) IV Push every 24 hours  sodium chloride 0.9% 1000 milliLiter(s) (250 mL/Hr) IV Continuous <Continuous>    MEDICATIONS  (PRN):  haloperidol IVPB 0.5 milliGRAM(s) IV Intermittent every 4 hours PRN nausea and/or vomiting      Imaging:  < from: US Duplex Venous Lower Ext, Left (06.24.13 @ 12:21) >    EXAM:  DUPLEX EXT VEINS LO UNI LTD LT                            PROCEDURE DATE:  Jun 24 2013   .      INTERPRETATION:  Left pain and swelling    FINDINGS:     duplex sonography inclusive of color and spectral doppler was performed   with and without compression.    Spontaneous flow is identified in the common femoral, superficial femoral   and popliteal veins. The veins are patent and compressible without   evidence of thrombus. The posterior tibial and peroneal veins are also   patent.    IMPRESSION:  No evidence of deep vein thrombosis in the visualized venous   segments of the left lower extremity.    < end of copied text >      
Post Op Day#: 1    Subjective: Doing well. just gas pain"    Objective: No acute crdio-pulmonary events overnight, effective pain control, just gas discomfort. Continuous pulse oximetry at bedside functioning, v/s stable, afebrile. Labs within acceptable range. Pt OOB and ambulated independently around the unit. Tolerated bariatric clear liquid diet  Voiding as expected.  Duplex LE negative for DVT.                                              Vital Signs Last 24 Hrs  T(C): 37.2 (16 Dec 2020 13:43), Max: 37.2 (16 Dec 2020 13:43)  T(F): 98.9 (16 Dec 2020 13:43), Max: 98.9 (16 Dec 2020 13:43)  HR: 60 (16 Dec 2020 13:43) (60 - 60)  BP: 152/71 (16 Dec 2020 13:43) (152/71 - 152/71)  BP(mean): --  RR: 19 (16 Dec 2020 13:43) (19 - 19)  SpO2: 99% (16 Dec 2020 13:43) (99% - 99%)                                               I&O's Summary    16 Dec 2020 07:01  -  17 Dec 2020 07:00  --------------------------------------------------------  IN: 430 mL / OUT: 0 mL / NET: 430 mL                                                                      12.6   9.29  )-----------( 266      ( 16 Dec 2020 07:21 )             39.9                                                 12-16    141  |  101  |  8   ----------------------------<  114<H>  4.2   |  26  |  0.91    Ca    9.7      16 Dec 2020 07:18  Phos  3.5     12-15  Mg     2.1     12-15          Physical Exam:         Lungs:  clear breath sounds b/l       Heart:  Regular rate & rhythm       Abdomen:  Soft, non-distended.  Scopes sites clean, dry and intact. + bs, - flatus, no rebound or guarding       Skin:  intact, pannus w/o rash       Extremities: + pulses, + 2 lower extremity edema (chronic), no calf tenderness, negative micheline's     VTE Risk Assessment Scores             Caprini VTE Risk Score:                                                                (   ), Perioperative and Postoperative VTE prophylaxis   [facility]  VTE Predicted RISK SCORE:  (<1%), no extended VTE prophylaxis      Assessment and Plan: S/P Lap Sleeve Gastrectomy POD#1    - Bariatric Clear diet today then protocol derived staged meal progression supervised by RD in outpatient setting  - DVT/GI prophylaxis, Incentive spirometry  - Ambulate Q 2 hours or as indicated  -  Procedure specific education including diet, vitamins and VTE prevention explained. Written materials given  - Medication reviewed and reconciled, Bariatric meds obtained from [facility] prior to d/c  -  Bariatric 8-Point d/c criteria met, d/c home on POD #1.  -  Follow up with Dr Small in 7-10 days, Dietitian and PMD in 30 days.      Payton Razo DNP, ANP  [phone]

## 2020-12-28 ENCOUNTER — APPOINTMENT (OUTPATIENT)
Dept: SURGERY | Facility: CLINIC | Age: 60
End: 2020-12-28
Payer: COMMERCIAL

## 2020-12-28 VITALS
WEIGHT: 250 LBS | RESPIRATION RATE: 17 BRPM | SYSTOLIC BLOOD PRESSURE: 149 MMHG | HEART RATE: 93 BPM | DIASTOLIC BLOOD PRESSURE: 86 MMHG | OXYGEN SATURATION: 97 % | BODY MASS INDEX: 40.18 KG/M2 | TEMPERATURE: 94.5 F | HEIGHT: 66 IN

## 2020-12-28 PROCEDURE — 99024 POSTOP FOLLOW-UP VISIT: CPT

## 2020-12-28 NOTE — ASSESSMENT
[FreeTextEntry1] : 60-year-old female recovering well status post laparoscopic sleeve gastrectomy 12/15/2020.

## 2020-12-28 NOTE — PHYSICAL EXAM
[Obese, well nourished, in no acute distress] : obese, well nourished, in no acute distress [Normal] : affect appropriate [de-identified] : Normal respirations [de-identified] : Soft, nondistended, tender to palpation at epigastric incision site.  Incisions are well-healed without erythema or drainage.

## 2020-12-28 NOTE — HISTORY OF PRESENT ILLNESS
[de-identified] : Pretty is a 59 y/o female here for post-operative visit. 12/15/20- laparoscopic sleeve gastrectomy. \par She is doing well postoperatively.  Incisional pain is improved significantly.  She reports she is tolerating liquids, however does have some gas pain and belching.  Passing flatus.  Feels constipated.

## 2020-12-28 NOTE — PLAN
[FreeTextEntry1] : [] Advance to pureed foods as per bariatric diet instructions at 2 weeks\par [] emphasized importance of maintaining excellent hydration, monitoring urine output and color, and keeping a bottle of water available at all times\par [] 1-2 protein shakes per day \par [] walk as much as tolerated\par [] miralax PRN constipation\par \par F/u 2 weeks\par

## 2021-01-11 ENCOUNTER — APPOINTMENT (OUTPATIENT)
Dept: SURGERY | Facility: CLINIC | Age: 61
End: 2021-01-11
Payer: COMMERCIAL

## 2021-01-11 VITALS
HEIGHT: 66 IN | TEMPERATURE: 97.3 F | RESPIRATION RATE: 16 BRPM | HEART RATE: 86 BPM | DIASTOLIC BLOOD PRESSURE: 84 MMHG | SYSTOLIC BLOOD PRESSURE: 143 MMHG | BODY MASS INDEX: 40.1 KG/M2 | OXYGEN SATURATION: 100 % | WEIGHT: 249.5 LBS

## 2021-01-11 DIAGNOSIS — Z09 ENCOUNTER FOR FOLLOW-UP EXAMINATION AFTER COMPLETED TREATMENT FOR CONDITIONS OTHER THAN MALIGNANT NEOPLASM: ICD-10-CM

## 2021-01-11 PROCEDURE — 99024 POSTOP FOLLOW-UP VISIT: CPT

## 2021-01-11 RX ORDER — B-COMPLEX WITH VITAMIN C
TABLET ORAL
Refills: 0 | Status: DISCONTINUED | COMMUNITY
End: 2021-01-11

## 2021-01-11 RX ORDER — NYSTATIN 100000 [USP'U]/G
100000 POWDER TOPICAL
Qty: 60 | Refills: 0 | Status: DISCONTINUED | COMMUNITY
Start: 2019-06-05 | End: 2021-01-11

## 2021-01-11 RX ORDER — POLYETHYLENE GLYCOL-3350 AND ELECTROLYTES WITH FLAVOR PACK 240; 5.84; 2.98; 6.72; 22.72 G/278.26G; G/278.26G; G/278.26G; G/278.26G; G/278.26G
240 POWDER, FOR SOLUTION ORAL
Qty: 4000 | Refills: 0 | Status: DISCONTINUED | COMMUNITY
Start: 2020-11-23

## 2021-01-11 RX ORDER — MINERAL OIL 100 MG/100ML
5 OIL ORAL; TOPICAL
Qty: 4 | Refills: 0 | Status: DISCONTINUED | COMMUNITY
Start: 2020-08-04 | End: 2021-01-11

## 2021-01-11 RX ORDER — LIRAGLUTIDE 6 MG/ML
18 INJECTION, SOLUTION SUBCUTANEOUS
Refills: 0 | Status: DISCONTINUED | COMMUNITY
End: 2021-01-11

## 2021-01-11 RX ORDER — HYOSCYAMINE SULFATE 0.12 MG/1
0.12 TABLET SUBLINGUAL
Qty: 28 | Refills: 0 | Status: DISCONTINUED | COMMUNITY
Start: 2020-12-16

## 2021-01-11 RX ORDER — ONDANSETRON 4 MG/1
4 TABLET, ORALLY DISINTEGRATING ORAL
Qty: 9 | Refills: 0 | Status: DISCONTINUED | COMMUNITY
Start: 2020-12-16

## 2021-01-11 RX ORDER — POLYETHYLENE GLYCOL 3350 17 G/17G
17 POWDER, FOR SOLUTION ORAL
Qty: 30 | Refills: 0 | Status: ACTIVE | COMMUNITY
Start: 2020-11-23

## 2021-01-11 RX ORDER — OMEPRAZOLE 40 MG/1
40 CAPSULE, DELAYED RELEASE ORAL
Qty: 30 | Refills: 0 | Status: DISCONTINUED | COMMUNITY
Start: 2020-12-16

## 2021-01-11 RX ORDER — GABAPENTIN 250 MG/5ML
250 SOLUTION ORAL
Qty: 540 | Refills: 0 | Status: ACTIVE | COMMUNITY
Start: 2020-12-29

## 2021-01-11 RX ORDER — AMOXICILLIN 500 MG/1
500 TABLET, FILM COATED ORAL
Qty: 30 | Refills: 0 | Status: DISCONTINUED | COMMUNITY
Start: 2020-07-09 | End: 2021-01-11

## 2021-01-11 RX ORDER — AMMONIUM LACTATE 12 %
12 CREAM (GRAM) TOPICAL
Qty: 280 | Refills: 0 | Status: DISCONTINUED | COMMUNITY
Start: 2019-06-13 | End: 2021-01-11

## 2021-01-11 RX ORDER — BACLOFEN 10 MG/1
10 TABLET ORAL
Qty: 60 | Refills: 0 | Status: ACTIVE | COMMUNITY
Start: 2020-12-29

## 2021-01-11 RX ORDER — COLD-HOT PACK
EACH MISCELLANEOUS
Refills: 0 | Status: DISCONTINUED | COMMUNITY
End: 2021-01-11

## 2021-01-11 RX ORDER — HYDROCODONE BITARTRATE AND ACETAMINOPHEN 7.5; 325 MG/15ML; MG/15ML
7.5-325 SOLUTION ORAL
Qty: 473 | Refills: 0 | Status: DISCONTINUED | COMMUNITY
Start: 2020-12-29

## 2021-01-11 RX ORDER — METHOCARBAMOL 500 MG/1
500 TABLET, FILM COATED ORAL
Refills: 0 | Status: DISCONTINUED | COMMUNITY
End: 2021-01-11

## 2021-01-11 RX ORDER — ISOPROPYL ALCOHOL 91 ML/100ML
17 LIQUID TOPICAL
Qty: 238 | Refills: 0 | Status: DISCONTINUED | COMMUNITY
Start: 2020-08-04

## 2021-01-11 NOTE — ASSESSMENT
[FreeTextEntry1] : 60-year-old female recovering well status post laparoscopic sleeve gastrectomy 12/15/2020.\par

## 2021-01-11 NOTE — PLAN
[FreeTextEntry1] : [] continue bariatric diet advancement per instructions\par [] reviewed importance of eating slowly, chewing thoroughly, stop eating when full\par [] recommend increasing cardiovascular exercise, goal 30 minutes per day\par [] continue multivitamins\par [] check nutrition panel\par [] Advised to focus on eating more slowly, taking smaller bites\par [] f/u in 2 months

## 2021-01-11 NOTE — REVIEW OF SYSTEMS
[Abdominal Pain] : no abdominal pain [Vomiting] : no vomiting [Reflux/Heartburn] : reflux/heartburn [Joint Pain] : joint pain [Negative] : Psychiatric

## 2021-01-11 NOTE — HISTORY OF PRESENT ILLNESS
[de-identified] : Pretty is a 61 y/o female here for post-operative visit. 12/15/20- laparoscopic sleeve gastrectomy.  She is down 16 pounds but is concerned because the last 2 weeks have not seen a significant amount of weight loss.  Her mobility is limited due to severe knee pain and back pain.  She got steroid injections to her knee and is scheduled to get an epidural for back pain.  She walks as much as her pain allows.  She is tolerating a soft diet, however sometimes experiences gas pain and burping.  She has occasional reflux.  She is having normal bowel movements.  She does not report any vomiting.\par  no

## 2021-01-11 NOTE — PHYSICAL EXAM
[Obese, well nourished, in no acute distress] : obese, well nourished, in no acute distress [Normal] : affect appropriate [de-identified] : Normal respirations [de-identified] : Soft, nondistended, nontender.  Incisions well-healed.

## 2021-02-20 ENCOUNTER — OUTPATIENT (OUTPATIENT)
Dept: OUTPATIENT SERVICES | Facility: HOSPITAL | Age: 61
LOS: 1 days | Discharge: HOME | End: 2021-02-20

## 2021-02-20 DIAGNOSIS — E66.01 MORBID (SEVERE) OBESITY DUE TO EXCESS CALORIES: ICD-10-CM

## 2021-02-20 DIAGNOSIS — E66.9 OBESITY, UNSPECIFIED: ICD-10-CM

## 2021-02-20 DIAGNOSIS — Z98.1 ARTHRODESIS STATUS: Chronic | ICD-10-CM

## 2021-02-20 DIAGNOSIS — Z00.01 ENCOUNTER FOR GENERAL ADULT MEDICAL EXAMINATION WITH ABNORMAL FINDINGS: ICD-10-CM

## 2021-02-20 DIAGNOSIS — Z98.890 OTHER SPECIFIED POSTPROCEDURAL STATES: Chronic | ICD-10-CM

## 2021-02-20 DIAGNOSIS — Z87.19 PERSONAL HISTORY OF OTHER DISEASES OF THE DIGESTIVE SYSTEM: Chronic | ICD-10-CM

## 2021-02-20 LAB
24R-OH-CALCIDIOL SERPL-MCNC: 56 NG/ML — SIGNIFICANT CHANGE UP (ref 30–80)
ALBUMIN SERPL ELPH-MCNC: 3.1 G/DL — LOW (ref 3.3–5)
ALP SERPL-CCNC: 116 U/L — SIGNIFICANT CHANGE UP (ref 40–120)
ALT FLD-CCNC: 30 U/L — SIGNIFICANT CHANGE UP (ref 12–78)
ANION GAP SERPL CALC-SCNC: 4 MMOL/L — LOW (ref 5–17)
AST SERPL-CCNC: 12 U/L — LOW (ref 15–37)
BASOPHILS # BLD AUTO: 0.04 K/UL — SIGNIFICANT CHANGE UP (ref 0–0.2)
BASOPHILS NFR BLD AUTO: 0.6 % — SIGNIFICANT CHANGE UP (ref 0–2)
BILIRUB DIRECT SERPL-MCNC: 0.17 MG/DL — SIGNIFICANT CHANGE UP (ref 0.05–0.2)
BILIRUB INDIRECT FLD-MCNC: 0.3 MG/DL — SIGNIFICANT CHANGE UP (ref 0.2–1)
BILIRUB SERPL-MCNC: 0.5 MG/DL — SIGNIFICANT CHANGE UP (ref 0.2–1.2)
BUN SERPL-MCNC: 11 MG/DL — SIGNIFICANT CHANGE UP (ref 7–23)
CALCIUM SERPL-MCNC: 9.2 MG/DL — SIGNIFICANT CHANGE UP (ref 8.5–10.1)
CHLORIDE SERPL-SCNC: 108 MMOL/L — SIGNIFICANT CHANGE UP (ref 96–108)
CO2 SERPL-SCNC: 31 MMOL/L — SIGNIFICANT CHANGE UP (ref 22–31)
CREAT SERPL-MCNC: 0.83 MG/DL — SIGNIFICANT CHANGE UP (ref 0.5–1.3)
EOSINOPHIL # BLD AUTO: 0.13 K/UL — SIGNIFICANT CHANGE UP (ref 0–0.5)
EOSINOPHIL NFR BLD AUTO: 1.9 % — SIGNIFICANT CHANGE UP (ref 0–6)
FERRITIN SERPL-MCNC: 131 NG/ML — SIGNIFICANT CHANGE UP (ref 15–150)
FOLATE SERPL-MCNC: >20 NG/ML — SIGNIFICANT CHANGE UP
GLUCOSE SERPL-MCNC: 116 MG/DL — HIGH (ref 70–99)
HCT VFR BLD CALC: 40.2 % — SIGNIFICANT CHANGE UP (ref 34.5–45)
HGB BLD-MCNC: 13 G/DL — SIGNIFICANT CHANGE UP (ref 11.5–15.5)
IMM GRANULOCYTES NFR BLD AUTO: 0.1 % — SIGNIFICANT CHANGE UP (ref 0–1.5)
IRON SATN MFR SERPL: 21 % — SIGNIFICANT CHANGE UP (ref 14–50)
IRON SATN MFR SERPL: 56 UG/DL — SIGNIFICANT CHANGE UP (ref 30–160)
LYMPHOCYTES # BLD AUTO: 2.31 K/UL — SIGNIFICANT CHANGE UP (ref 1–3.3)
LYMPHOCYTES # BLD AUTO: 33.5 % — SIGNIFICANT CHANGE UP (ref 13–44)
MCHC RBC-ENTMCNC: 26.4 PG — LOW (ref 27–34)
MCHC RBC-ENTMCNC: 32.3 GM/DL — SIGNIFICANT CHANGE UP (ref 32–36)
MCV RBC AUTO: 81.7 FL — SIGNIFICANT CHANGE UP (ref 80–100)
MONOCYTES # BLD AUTO: 0.51 K/UL — SIGNIFICANT CHANGE UP (ref 0–0.9)
MONOCYTES NFR BLD AUTO: 7.4 % — SIGNIFICANT CHANGE UP (ref 2–14)
NEUTROPHILS # BLD AUTO: 3.9 K/UL — SIGNIFICANT CHANGE UP (ref 1.8–7.4)
NEUTROPHILS NFR BLD AUTO: 56.5 % — SIGNIFICANT CHANGE UP (ref 43–77)
NRBC # BLD: 0 /100 WBCS — SIGNIFICANT CHANGE UP (ref 0–0)
PLATELET # BLD AUTO: 241 K/UL — SIGNIFICANT CHANGE UP (ref 150–400)
POTASSIUM SERPL-MCNC: 4.2 MMOL/L — SIGNIFICANT CHANGE UP (ref 3.5–5.3)
POTASSIUM SERPL-SCNC: 4.2 MMOL/L — SIGNIFICANT CHANGE UP (ref 3.5–5.3)
PROT SERPL-MCNC: 6.7 GM/DL — SIGNIFICANT CHANGE UP (ref 6–8.3)
RBC # BLD: 4.92 M/UL — SIGNIFICANT CHANGE UP (ref 3.8–5.2)
RBC # FLD: 14.8 % — HIGH (ref 10.3–14.5)
SODIUM SERPL-SCNC: 143 MMOL/L — SIGNIFICANT CHANGE UP (ref 135–145)
TIBC SERPL-MCNC: 265 UG/DL — SIGNIFICANT CHANGE UP (ref 220–430)
UIBC SERPL-MCNC: 209 UG/DL — SIGNIFICANT CHANGE UP (ref 110–370)
VIT B12 SERPL-MCNC: >2000 PG/ML — HIGH (ref 232–1245)
WBC # BLD: 6.9 K/UL — SIGNIFICANT CHANGE UP (ref 3.8–10.5)
WBC # FLD AUTO: 6.9 K/UL — SIGNIFICANT CHANGE UP (ref 3.8–10.5)

## 2021-02-26 LAB — VIT B1 SERPL-MCNC: 92.4 NMOL/L — SIGNIFICANT CHANGE UP (ref 66.5–200)

## 2021-03-22 ENCOUNTER — APPOINTMENT (OUTPATIENT)
Dept: SURGERY | Facility: CLINIC | Age: 61
End: 2021-03-22
Payer: COMMERCIAL

## 2021-03-22 VITALS
BODY MASS INDEX: 37.45 KG/M2 | TEMPERATURE: 97.4 F | HEART RATE: 91 BPM | OXYGEN SATURATION: 99 % | SYSTOLIC BLOOD PRESSURE: 127 MMHG | WEIGHT: 233 LBS | HEIGHT: 66 IN | DIASTOLIC BLOOD PRESSURE: 83 MMHG | RESPIRATION RATE: 17 BRPM

## 2021-03-22 DIAGNOSIS — Z09 ENCOUNTER FOR FOLLOW-UP EXAMINATION AFTER COMPLETED TREATMENT FOR CONDITIONS OTHER THAN MALIGNANT NEOPLASM: ICD-10-CM

## 2021-03-22 DIAGNOSIS — Z00.00 ENCOUNTER FOR GENERAL ADULT MEDICAL EXAMINATION W/OUT ABNORMAL FINDINGS: ICD-10-CM

## 2021-03-22 PROCEDURE — 99212 OFFICE O/P EST SF 10 MIN: CPT

## 2021-03-22 PROCEDURE — 99072 ADDL SUPL MATRL&STAF TM PHE: CPT

## 2021-03-22 NOTE — ASSESSMENT
[FreeTextEntry1] : 60-year-old female recovering well status post laparoscopic sleeve gastrectomy 12/15/2020.\par Good weight loss, down 32 pounds since surgery.\par Feels well, knee pain has improved.\par Iron deficiency anemia

## 2021-03-22 NOTE — PLAN
[FreeTextEntry1] : [] cont bariatric diet and consult with nutritionist as needed\par [] goal 30 min cardiovascular exercise daily, with some weight resistance training as tolerated\par [] continue multivitamins\par [] cont supplemental iron\par [] f/u 3 months

## 2021-03-22 NOTE — HISTORY OF PRESENT ILLNESS
[de-identified] : Pretty is a 59 y/o female here for follow up visit. Patient is s/p laparoscopic sleeve gastrectomy on 12/15/20. Last seen on 1/11/21. \par She is doing very well and has lost an additional 16 pounds.  She is down a total of 32 pounds since surgery.  She feels much better, knee pain is improved, and she is maintaining a healthy diet with small portions and good satiety.  She reports no reflux or heartburn.  She recently had blood work done with her primary care doctor, which was significant for iron deficiency anemia.  She is taking iron.

## 2021-03-22 NOTE — PHYSICAL EXAM
[Obese, well nourished, in no acute distress] : obese, well nourished, in no acute distress [Normal] : affect appropriate [de-identified] : Normal respirations [de-identified] : Soft, nondistended, nontender.  Incisions well-healed.

## 2021-04-17 ENCOUNTER — EMERGENCY (EMERGENCY)
Facility: HOSPITAL | Age: 61
LOS: 0 days | Discharge: HOME | End: 2021-04-18
Attending: EMERGENCY MEDICINE
Payer: COMMERCIAL

## 2021-04-17 VITALS
DIASTOLIC BLOOD PRESSURE: 96 MMHG | HEART RATE: 88 BPM | TEMPERATURE: 98 F | SYSTOLIC BLOOD PRESSURE: 157 MMHG | WEIGHT: 233.03 LBS | RESPIRATION RATE: 16 BRPM | OXYGEN SATURATION: 96 % | HEIGHT: 66 IN

## 2021-04-17 DIAGNOSIS — Z98.1 ARTHRODESIS STATUS: Chronic | ICD-10-CM

## 2021-04-17 DIAGNOSIS — M54.41 LUMBAGO WITH SCIATICA, RIGHT SIDE: ICD-10-CM

## 2021-04-17 DIAGNOSIS — Z98.1 ARTHRODESIS STATUS: ICD-10-CM

## 2021-04-17 DIAGNOSIS — Z91.19 PATIENT'S NONCOMPLIANCE WITH OTHER MEDICAL TREATMENT AND REGIMEN: ICD-10-CM

## 2021-04-17 DIAGNOSIS — Z98.84 BARIATRIC SURGERY STATUS: ICD-10-CM

## 2021-04-17 DIAGNOSIS — E11.9 TYPE 2 DIABETES MELLITUS WITHOUT COMPLICATIONS: ICD-10-CM

## 2021-04-17 DIAGNOSIS — N30.00 ACUTE CYSTITIS WITHOUT HEMATURIA: ICD-10-CM

## 2021-04-17 DIAGNOSIS — Z98.890 OTHER SPECIFIED POSTPROCEDURAL STATES: Chronic | ICD-10-CM

## 2021-04-17 DIAGNOSIS — Z87.19 PERSONAL HISTORY OF OTHER DISEASES OF THE DIGESTIVE SYSTEM: Chronic | ICD-10-CM

## 2021-04-17 DIAGNOSIS — M17.0 BILATERAL PRIMARY OSTEOARTHRITIS OF KNEE: ICD-10-CM

## 2021-04-17 DIAGNOSIS — M54.16 RADICULOPATHY, LUMBAR REGION: ICD-10-CM

## 2021-04-17 DIAGNOSIS — E66.9 OBESITY, UNSPECIFIED: ICD-10-CM

## 2021-04-17 DIAGNOSIS — G47.33 OBSTRUCTIVE SLEEP APNEA (ADULT) (PEDIATRIC): ICD-10-CM

## 2021-04-17 DIAGNOSIS — I10 ESSENTIAL (PRIMARY) HYPERTENSION: ICD-10-CM

## 2021-04-17 LAB
ALBUMIN SERPL ELPH-MCNC: 3.6 G/DL — SIGNIFICANT CHANGE UP (ref 3.3–5)
ALP SERPL-CCNC: 123 U/L — HIGH (ref 40–120)
ALT FLD-CCNC: 30 U/L — SIGNIFICANT CHANGE UP (ref 12–78)
ANION GAP SERPL CALC-SCNC: 6 MMOL/L — SIGNIFICANT CHANGE UP (ref 5–17)
AST SERPL-CCNC: 14 U/L — LOW (ref 15–37)
BASOPHILS # BLD AUTO: 0.05 K/UL — SIGNIFICANT CHANGE UP (ref 0–0.2)
BASOPHILS NFR BLD AUTO: 0.4 % — SIGNIFICANT CHANGE UP (ref 0–2)
BILIRUB SERPL-MCNC: 0.3 MG/DL — SIGNIFICANT CHANGE UP (ref 0.2–1.2)
BUN SERPL-MCNC: 23 MG/DL — SIGNIFICANT CHANGE UP (ref 7–23)
CALCIUM SERPL-MCNC: 9.1 MG/DL — SIGNIFICANT CHANGE UP (ref 8.5–10.1)
CHLORIDE SERPL-SCNC: 105 MMOL/L — SIGNIFICANT CHANGE UP (ref 96–108)
CO2 SERPL-SCNC: 29 MMOL/L — SIGNIFICANT CHANGE UP (ref 22–31)
CREAT SERPL-MCNC: 0.83 MG/DL — SIGNIFICANT CHANGE UP (ref 0.5–1.3)
EOSINOPHIL # BLD AUTO: 0.01 K/UL — SIGNIFICANT CHANGE UP (ref 0–0.5)
EOSINOPHIL NFR BLD AUTO: 0.1 % — SIGNIFICANT CHANGE UP (ref 0–6)
GLUCOSE SERPL-MCNC: 148 MG/DL — HIGH (ref 70–99)
HCT VFR BLD CALC: 39.8 % — SIGNIFICANT CHANGE UP (ref 34.5–45)
HGB BLD-MCNC: 13.2 G/DL — SIGNIFICANT CHANGE UP (ref 11.5–15.5)
IMM GRANULOCYTES NFR BLD AUTO: 0.3 % — SIGNIFICANT CHANGE UP (ref 0–1.5)
LYMPHOCYTES # BLD AUTO: 1.97 K/UL — SIGNIFICANT CHANGE UP (ref 1–3.3)
LYMPHOCYTES # BLD AUTO: 17.3 % — SIGNIFICANT CHANGE UP (ref 13–44)
MCHC RBC-ENTMCNC: 27 PG — SIGNIFICANT CHANGE UP (ref 27–34)
MCHC RBC-ENTMCNC: 33.2 GM/DL — SIGNIFICANT CHANGE UP (ref 32–36)
MCV RBC AUTO: 81.6 FL — SIGNIFICANT CHANGE UP (ref 80–100)
MONOCYTES # BLD AUTO: 0.84 K/UL — SIGNIFICANT CHANGE UP (ref 0–0.9)
MONOCYTES NFR BLD AUTO: 7.4 % — SIGNIFICANT CHANGE UP (ref 2–14)
NEUTROPHILS # BLD AUTO: 8.46 K/UL — HIGH (ref 1.8–7.4)
NEUTROPHILS NFR BLD AUTO: 74.5 % — SIGNIFICANT CHANGE UP (ref 43–77)
NRBC # BLD: 0 /100 WBCS — SIGNIFICANT CHANGE UP (ref 0–0)
PLATELET # BLD AUTO: 257 K/UL — SIGNIFICANT CHANGE UP (ref 150–400)
POTASSIUM SERPL-MCNC: 4.2 MMOL/L — SIGNIFICANT CHANGE UP (ref 3.5–5.3)
POTASSIUM SERPL-SCNC: 4.2 MMOL/L — SIGNIFICANT CHANGE UP (ref 3.5–5.3)
PROT SERPL-MCNC: 7.3 GM/DL — SIGNIFICANT CHANGE UP (ref 6–8.3)
RBC # BLD: 4.88 M/UL — SIGNIFICANT CHANGE UP (ref 3.8–5.2)
RBC # FLD: 14.7 % — HIGH (ref 10.3–14.5)
SODIUM SERPL-SCNC: 140 MMOL/L — SIGNIFICANT CHANGE UP (ref 135–145)
WBC # BLD: 11.36 K/UL — HIGH (ref 3.8–10.5)
WBC # FLD AUTO: 11.36 K/UL — HIGH (ref 3.8–10.5)

## 2021-04-17 PROCEDURE — 74176 CT ABD & PELVIS W/O CONTRAST: CPT | Mod: 26,MA

## 2021-04-17 PROCEDURE — 99285 EMERGENCY DEPT VISIT HI MDM: CPT

## 2021-04-17 RX ORDER — HYDROMORPHONE HYDROCHLORIDE 2 MG/ML
2 INJECTION INTRAMUSCULAR; INTRAVENOUS; SUBCUTANEOUS ONCE
Refills: 0 | Status: DISCONTINUED | OUTPATIENT
Start: 2021-04-17 | End: 2021-04-17

## 2021-04-17 RX ORDER — METHOCARBAMOL 500 MG/1
1 TABLET, FILM COATED ORAL
Qty: 0 | Refills: 0 | DISCHARGE

## 2021-04-17 RX ORDER — MELOXICAM 15 MG/1
1 TABLET ORAL
Qty: 0 | Refills: 0 | DISCHARGE

## 2021-04-17 RX ORDER — METOPROLOL TARTRATE 50 MG
1 TABLET ORAL
Qty: 0 | Refills: 0 | DISCHARGE

## 2021-04-17 RX ORDER — KETOROLAC TROMETHAMINE 30 MG/ML
30 SYRINGE (ML) INJECTION ONCE
Refills: 0 | Status: DISCONTINUED | OUTPATIENT
Start: 2021-04-17 | End: 2021-04-17

## 2021-04-17 RX ORDER — LIRAGLUTIDE 6 MG/ML
0 INJECTION SUBCUTANEOUS
Qty: 0 | Refills: 0 | DISCHARGE

## 2021-04-17 RX ADMIN — Medication 30 MILLIGRAM(S): at 23:39

## 2021-04-17 RX ADMIN — HYDROMORPHONE HYDROCHLORIDE 2 MILLIGRAM(S): 2 INJECTION INTRAMUSCULAR; INTRAVENOUS; SUBCUTANEOUS at 23:59

## 2021-04-17 RX ADMIN — HYDROMORPHONE HYDROCHLORIDE 2 MILLIGRAM(S): 2 INJECTION INTRAMUSCULAR; INTRAVENOUS; SUBCUTANEOUS at 23:39

## 2021-04-17 RX ADMIN — Medication 30 MILLIGRAM(S): at 23:59

## 2021-04-17 NOTE — ED PROVIDER NOTE - CARE PLAN
Principal Discharge DX:	Radicular pain   Principal Discharge DX:	Radicular pain  Secondary Diagnosis:	Acute cystitis without hematuria

## 2021-04-17 NOTE — ED PROVIDER NOTE - PHYSICAL EXAMINATION
Vitals: HTN at 157/96, otherwise WNL  Gen: AAOx3, NAD, sitting uncomfortably in stretcher  Head: ncat, perrla, eomi b/l  Neck: supple, no lymphadenopathy, no midline deviation  Heart: rrr, no m/r/g  Lungs: CTA b/l, no rales/ronchi/wheezes  Abd: soft, nontender, non-distended, no rebound or guarding  Ext: no clubbing/cyanosis/edema  Neuro: sensation and muscle strength intact b/l

## 2021-04-17 NOTE — ED PROVIDER NOTE - OBJECTIVE STATEMENT
61 yo F with R sided sciatica, now with additional R groin pain that radiates from R side.  Pt. denies inciting event.  Pain worse with movement and lifting.  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  Today pain was too much to bear.  Pt. denies hernia and hx of surgery in RLQ/groin area/hip.  She saw her orthopedist first who gave her script for new steroid injections in R hip, as he believes pain is coming from R hip.  No specific trauma, per patient.  No other complaints or associated symptoms.  Pain is described as burning.    ROS: negative for fever, cough, headache, chest pain, shortness of breath, abd pain, nausea, vomiting, diarrhea, rash, and focal weakness--all other systems reviewed are negative.   PMH: back fusion sx 2018, gastric sleeve 12/2020; Meds: gabapentin, Vicodin, muscle relaxer; SH: Denies smoking/drinking/drug use

## 2021-04-17 NOTE — ED ADULT NURSE NOTE - PMH
HTN (hypertension)    OA (osteoarthritis)  bilateral knees  Obesity  BMI 43.0  WILLAM (obstructive sleep apnea)  dx 12/2/20, recommendation CPAP at night, pt has not started treatment yet  T2DM (type 2 diabetes mellitus)

## 2021-04-17 NOTE — ED PROVIDER NOTE - CARE PROVIDER_API CALL
Prieto Gunter)  Orthopaedic Surgery  [address] [place], [place] [address]  Phone: [phone]  Fax: [phone]  Follow Up Time: 4-6 Days

## 2021-04-17 NOTE — ED ADULT NURSE NOTE - NSIMPLEMENTINTERV_GEN_ALL_ED
Implemented All Universal Safety Interventions:  [place] to call system. Call bell, personal items and telephone within reach. Instruct patient to call for assistance. Room bathroom lighting operational. Non-slip footwear when patient is off stretcher. Physically safe environment: no spills, clutter or unnecessary equipment. Stretcher in lowest position, wheels locked, appropriate side rails in place.

## 2021-04-17 NOTE — ED PROVIDER NOTE - PATIENT PORTAL LINK FT
You can access the FollowMyHealth Patient Portal offered by [facility] by registering at the following website: [email or web address]. By joining [facility]’s FollowMyHealth portal, you will also be able to view your health information using other applications (apps) compatible with our system.

## 2021-04-17 NOTE — ED PROVIDER NOTE - PROGRESS NOTE
Talked with TM about a yellow evaluation tool from 4/5/21. TM is asymptomatic and cleared by . TM instructed to remain working and if any new symptoms/exposures should occur to file online evaluation tool.  
Improved.

## 2021-04-17 NOTE — ED PROVIDER NOTE - CLINICAL SUMMARY MEDICAL DECISION MAKING FREE TEXT BOX
61 yo F with RLQ pain/hip pain, likely radicular pain from lumbar, doubt intraabdominal pathology, hernia, uti, renal stone  -CT ab/pel, ua, cx, basic labs, coags, IV, Dilaudid IV, Toradol  -f/u results, reeval

## 2021-04-17 NOTE — ED ADULT TRIAGE NOTE - CHIEF COMPLAINT QUOTE
pt c/o lower back pain radiating to groin x 3 weeks,  epidural on 4/13/21 for same.  pt currently taking methylprednisolone, last dose 8pm.  pt also took gabapentin & vicodin at 5pm without relief  (PMH- back fusion sx 2018, gastric sleeve 12/2020)

## 2021-04-17 NOTE — ED ADULT NURSE NOTE - PSH
H/O colonoscopy  11/25/20  H/O endoscopy  8/13/20  History of rectal abscess  drainage 2012  S/P arthroscopy of knee  right 2016  S/P lumbar spinal fusion  2018 +hardware

## 2021-04-17 NOTE — ED ADULT NURSE NOTE - OBJECTIVE STATEMENT
pt complain of pain on the right hips radiates to the right groin. pt states she was already seen by orthopedic doctor and was scheduled to take steroid shot on next wednesday. no burning pain when urinating, numbness only tingling sensation as per pt.

## 2021-04-17 NOTE — ED PROVIDER NOTE - PROGRESS NOTE DETAILS
Results reported to patient--grossly benign, labs shows uti, CT shows no acute pathology   Pt. reports feeling better after meds  pt. agrees to f/u with primary care outpt., ortho referral given   pt. understands to return to ED if symptoms worsen; will d/c with pain meds, pt. educated on narcotic use/misuse

## 2021-04-18 VITALS
HEART RATE: 73 BPM | RESPIRATION RATE: 18 BRPM | DIASTOLIC BLOOD PRESSURE: 91 MMHG | OXYGEN SATURATION: 99 % | SYSTOLIC BLOOD PRESSURE: 151 MMHG

## 2021-04-18 LAB
APPEARANCE UR: CLEAR — SIGNIFICANT CHANGE UP
APTT BLD: 29 SEC — SIGNIFICANT CHANGE UP (ref 27.5–35.5)
BACTERIA # UR AUTO: ABNORMAL
BILIRUB UR-MCNC: NEGATIVE — SIGNIFICANT CHANGE UP
COLOR SPEC: YELLOW — SIGNIFICANT CHANGE UP
DIFF PNL FLD: NEGATIVE — SIGNIFICANT CHANGE UP
EPI CELLS # UR: SIGNIFICANT CHANGE UP
GLUCOSE UR QL: NEGATIVE MG/DL — SIGNIFICANT CHANGE UP
INR BLD: 1.2 RATIO — HIGH (ref 0.88–1.16)
KETONES UR-MCNC: NEGATIVE — SIGNIFICANT CHANGE UP
LEUKOCYTE ESTERASE UR-ACNC: ABNORMAL
NITRITE UR-MCNC: NEGATIVE — SIGNIFICANT CHANGE UP
PH UR: 6 — SIGNIFICANT CHANGE UP (ref 5–8)
PROT UR-MCNC: NEGATIVE MG/DL — SIGNIFICANT CHANGE UP
PROTHROM AB SERPL-ACNC: 13.8 SEC — HIGH (ref 10.6–13.6)
RBC CASTS # UR COMP ASSIST: ABNORMAL /HPF (ref 0–4)
SP GR SPEC: 1.01 — SIGNIFICANT CHANGE UP (ref 1.01–1.02)
UROBILINOGEN FLD QL: NEGATIVE MG/DL — SIGNIFICANT CHANGE UP
WBC UR QL: SIGNIFICANT CHANGE UP

## 2021-04-18 RX ORDER — HYDROMORPHONE HYDROCHLORIDE 2 MG/ML
1 INJECTION INTRAMUSCULAR; INTRAVENOUS; SUBCUTANEOUS
Qty: 24 | Refills: 0
Start: 2021-04-18 | End: 2021-04-21

## 2021-04-18 RX ORDER — CIPROFLOXACIN LACTATE 400MG/40ML
1 VIAL (ML) INTRAVENOUS
Qty: 6 | Refills: 0
Start: 2021-04-18 | End: 2021-04-20

## 2021-04-18 RX ORDER — KETOROLAC TROMETHAMINE 30 MG/ML
1 SYRINGE (ML) INJECTION
Qty: 20 | Refills: 0
Start: 2021-04-18 | End: 2021-04-22

## 2021-04-19 LAB
CULTURE RESULTS: SIGNIFICANT CHANGE UP
SPECIMEN SOURCE: SIGNIFICANT CHANGE UP

## 2021-06-17 NOTE — PRE-OP CHECKLIST - ISOLATION PRECAUTIONS
none Clofazimine Counseling:  I discussed with the patient the risks of clofazimine including but not limited to skin and eye pigmentation, liver damage, nausea/vomiting, gastrointestinal bleeding and allergy.

## 2021-06-28 ENCOUNTER — APPOINTMENT (OUTPATIENT)
Dept: SURGERY | Facility: CLINIC | Age: 61
End: 2021-06-28
Payer: COMMERCIAL

## 2021-06-28 VITALS
SYSTOLIC BLOOD PRESSURE: 134 MMHG | BODY MASS INDEX: 35.68 KG/M2 | HEIGHT: 66 IN | TEMPERATURE: 97.2 F | WEIGHT: 222 LBS | DIASTOLIC BLOOD PRESSURE: 83 MMHG | RESPIRATION RATE: 17 BRPM | OXYGEN SATURATION: 98 % | HEART RATE: 93 BPM

## 2021-06-28 PROCEDURE — 99024 POSTOP FOLLOW-UP VISIT: CPT

## 2021-06-28 NOTE — HISTORY OF PRESENT ILLNESS
[de-identified] : Pretty is a 59 y/o female here for follow up visit. Patient is s/p laparoscopic sleeve gastrectomy on 12/15/20.\par \par Pt presents for follow up after sleeve gastrectomy - she is down 10 lbs from her last visit 3 mo ago, down total of 40lbs. She is happy with her weight loss, and has a personal goal of 210. She is eating a mostly protein and vegetable diet, though she admits to increasing her carbohydrate intake (whole grain english muffin). She does not drink soda or other sweetened beverages. Her exercise is limited by knee pain, but she has an appointment to be evaluated for knee surgery. \par \par She denies reflux or vomiting, no dysphagia. She is still taking her multivitamin.

## 2021-06-28 NOTE — PLAN
[FreeTextEntry1] : - Try to reduce carbohydrate intake, continue high protein and vegetables \par - hydration \par - Follow up in 6 month.

## 2021-06-28 NOTE — REASON FOR VISIT
[Follow-Up Visit] : a follow-up visit for [S/P Bariatric Surgery] : s/p bariatric surgery [FreeTextEntry2] : s/p sleeve gastrectomy 12/15/21

## 2021-06-28 NOTE — ASSESSMENT
[FreeTextEntry1] : 59yo F s/p sleeve gastrectomy 12/15/21, down 40lbs since preop, currently 222. She is getting knee surgery in the next few months which will help with her exercise and continued weight loss.

## 2021-09-08 ENCOUNTER — OUTPATIENT (OUTPATIENT)
Dept: OUTPATIENT SERVICES | Facility: HOSPITAL | Age: 61
LOS: 1 days | Discharge: HOME | End: 2021-09-08
Payer: COMMERCIAL

## 2021-09-08 VITALS
SYSTOLIC BLOOD PRESSURE: 125 MMHG | HEART RATE: 78 BPM | WEIGHT: 222.89 LBS | HEIGHT: 66 IN | TEMPERATURE: 98 F | RESPIRATION RATE: 16 BRPM | DIASTOLIC BLOOD PRESSURE: 78 MMHG | OXYGEN SATURATION: 98 %

## 2021-09-08 DIAGNOSIS — Z98.890 OTHER SPECIFIED POSTPROCEDURAL STATES: Chronic | ICD-10-CM

## 2021-09-08 DIAGNOSIS — M17.11 UNILATERAL PRIMARY OSTEOARTHRITIS, RIGHT KNEE: ICD-10-CM

## 2021-09-08 DIAGNOSIS — Z01.818 ENCOUNTER FOR OTHER PREPROCEDURAL EXAMINATION: ICD-10-CM

## 2021-09-08 DIAGNOSIS — Z98.1 ARTHRODESIS STATUS: Chronic | ICD-10-CM

## 2021-09-08 DIAGNOSIS — Z87.19 PERSONAL HISTORY OF OTHER DISEASES OF THE DIGESTIVE SYSTEM: Chronic | ICD-10-CM

## 2021-09-08 DIAGNOSIS — Z98.84 BARIATRIC SURGERY STATUS: Chronic | ICD-10-CM

## 2021-09-08 LAB
A1C WITH ESTIMATED AVERAGE GLUCOSE RESULT: 5.9 % — HIGH (ref 4–5.6)
ANION GAP SERPL CALC-SCNC: 3 MMOL/L — LOW (ref 5–17)
APTT BLD: 29.9 SEC — SIGNIFICANT CHANGE UP (ref 27.5–35.5)
BLD GP AB SCN SERPL QL: SIGNIFICANT CHANGE UP
BUN SERPL-MCNC: 21 MG/DL — SIGNIFICANT CHANGE UP (ref 7–23)
CALCIUM SERPL-MCNC: 9.2 MG/DL — SIGNIFICANT CHANGE UP (ref 8.5–10.1)
CHLORIDE SERPL-SCNC: 111 MMOL/L — HIGH (ref 96–108)
CO2 SERPL-SCNC: 28 MMOL/L — SIGNIFICANT CHANGE UP (ref 22–31)
CREAT SERPL-MCNC: 0.94 MG/DL — SIGNIFICANT CHANGE UP (ref 0.5–1.3)
ESTIMATED AVERAGE GLUCOSE: 123 MG/DL — HIGH (ref 68–114)
GLUCOSE SERPL-MCNC: 86 MG/DL — SIGNIFICANT CHANGE UP (ref 70–99)
HCT VFR BLD CALC: 38 % — SIGNIFICANT CHANGE UP (ref 34.5–45)
HGB BLD-MCNC: 12.6 G/DL — SIGNIFICANT CHANGE UP (ref 11.5–15.5)
INR BLD: 1.04 RATIO — SIGNIFICANT CHANGE UP (ref 0.88–1.16)
MCHC RBC-ENTMCNC: 27.2 PG — SIGNIFICANT CHANGE UP (ref 27–34)
MCHC RBC-ENTMCNC: 33.2 GM/DL — SIGNIFICANT CHANGE UP (ref 32–36)
MCV RBC AUTO: 81.9 FL — SIGNIFICANT CHANGE UP (ref 80–100)
NRBC # BLD: 0 /100 WBCS — SIGNIFICANT CHANGE UP (ref 0–0)
PLATELET # BLD AUTO: 227 K/UL — SIGNIFICANT CHANGE UP (ref 150–400)
POTASSIUM SERPL-MCNC: 3.7 MMOL/L — SIGNIFICANT CHANGE UP (ref 3.5–5.3)
POTASSIUM SERPL-SCNC: 3.7 MMOL/L — SIGNIFICANT CHANGE UP (ref 3.5–5.3)
PROTHROM AB SERPL-ACNC: 12 SEC — SIGNIFICANT CHANGE UP (ref 10.6–13.6)
RBC # BLD: 4.64 M/UL — SIGNIFICANT CHANGE UP (ref 3.8–5.2)
RBC # FLD: 14.1 % — SIGNIFICANT CHANGE UP (ref 10.3–14.5)
SODIUM SERPL-SCNC: 142 MMOL/L — SIGNIFICANT CHANGE UP (ref 135–145)
WBC # BLD: 6 K/UL — SIGNIFICANT CHANGE UP (ref 3.8–10.5)
WBC # FLD AUTO: 6 K/UL — SIGNIFICANT CHANGE UP (ref 3.8–10.5)

## 2021-09-08 PROCEDURE — 93010 ELECTROCARDIOGRAM REPORT: CPT

## 2021-09-08 RX ORDER — GABAPENTIN 400 MG/1
1 CAPSULE ORAL
Qty: 0 | Refills: 0 | DISCHARGE

## 2021-09-08 NOTE — OCCUPATIONAL THERAPY INITIAL EVALUATION ADULT - GENERAL OBSERVATIONS, REHAB EVAL
Chart reviewed. Patient encountered seated in chair in rehab preop room in [facility]. Patient underwent occupational therapy pre-operative consultation to determine current functional ADL limitations in order to provide the right equipment for patient to perform functional ADL post operation.

## 2021-09-08 NOTE — H&P PST ADULT - NSICDXPASTSURGICALHX_GEN_ALL_CORE_FT
PAST SURGICAL HISTORY:  H/O bariatric surgery gastic sleeve 12/2020    H/O colonoscopy 11/25/20    H/O endoscopy 8/13/20    History of rectal abscess drainage 2012    S/P arthroscopy of knee right 2016    S/P lumbar spinal fusion 2018 +hardware

## 2021-09-08 NOTE — OCCUPATIONAL THERAPY INITIAL EVALUATION ADULT - LEVEL OF INDEPENDENCE: EATING, OT EVAL
"Juan who is being evaluated via a billable teledermatology visit.             The patient has been notified of following:            \"We have asked you to send in photos via [employer] or e-mail. These photos will be seen and reviewed by an MD or [facility].  A telederm visit is not as thorough as an in-person visit, photo assessment does not replace an in-person skin exam.  The quality of the photograph sent may not be of the same quality as that taken by the dermatology clinic. With that being said, we have found that certain health care needs can be provided without the need for a physical exam.  This service lets us provide the care you need with a short phone conversation. If prescriptions are needed we can send directly to your pharmacy.If lab work is needed we can place an order for that and you can then stop by our lab to have the test done at a later time. An MD/PA/Resident will call you around the time of your visit. This may be from a blocked number.     This is a billable visit. If during the course of the call the physician/provider feels a telephone visit is not appropriate, you will not be charged for this service.            Patient has given verbal consent for Telephone visit?  Yes           The patient would like to proceed with an teledermatology because of the COVID Pandemic.     Patient complains of    Follow up/ next steps        ALLERGIES REVIEWED?  Yes   Pediatric Dermatology- Review of Systems Questions (return patient)          Goal for today's visit? Follow up and next steps      IN THE LAST 2 WEEKS     Fever- no     Mouth/Throat Sores- no/no     Weight Gain/Loss - no/no     Cough/Wheezing- no/no     Change in Appetite- no     Chest Discomfort/Heartburn - no/no     Bone Pain- no     Nausea/Vomiting - no/no     Joint Pain/Swelling - no/no     Constipation/Diarrhea - no/no     Headaches/Dizziness/Change in Vision- no/no/no     Pain with Urination- no     Ear Pain/Hearing Loss- no/no     Nasal " Discharge/Bleeding- no/no     Sadness/Irritability- no/no     Anxiety/Moodiness-no/no     Ashly Aguilar LPN     independent

## 2021-09-08 NOTE — H&P PST ADULT - BREASTS
Erivedge Counseling- I discussed with the patient the risks of Erivedge including but not limited to nausea, vomiting, diarrhea, constipation, weight loss, changes in the sense of taste, decreased appetite, muscle spasms, and hair loss.  The patient verbalized understanding of the proper use and possible adverse effects of Erivedge.  All of the patient's questions and concerns were addressed. not examined

## 2021-09-08 NOTE — PHYSICAL THERAPY INITIAL EVALUATION ADULT - ADDITIONAL COMMENTS
Pt lives with her daughter and granddaughter (whom can provide assist upon D/C home) in a private home, 5 entry steps c B/L rails(far apart), 13 steps c R rail up. Pt has a tub/shower combo with a retractable shower head, standard toilet seat height, & no grab bar. Pt states she is currently independent with all functional mobility including community ambulation with straight cane. Pt also owns straight cane & commode (both in good working condition & easily accessible). Pt states she is independent with ADL's as well. Pt reports daily 5/10 pain at rest & states it is worse with any activity 10/10. Pt is right hand dominant, wears eye glasses, drives, & is currently working.  Pt reports she has the most difficulty time "getting up & moving around" after prolonged sitting. Pt stated taking vicodin for pain management. Goal of therapy: manage pain & improve functional mobility.

## 2021-09-08 NOTE — H&P PST ADULT - NSICDXPASTMEDICALHX_GEN_ALL_CORE_FT
PAST MEDICAL HISTORY:  HTN (hypertension)     OA (osteoarthritis) bilateral knees    Obesity BMI 43.    WILLAM (obstructive sleep apnea) dx 12/2/20- s/p gastric sleeve 12/15/2020- told mild WILLAM does not use CPAP    T2DM (type 2 diabetes mellitus)

## 2021-09-08 NOTE — OCCUPATIONAL THERAPY INITIAL EVALUATION ADULT - SOCIAL CONCERNS
Pt voiced concerns about her recovery at home.  Pt endorsed that her daughter will be able to assist her after discharge home post-operatively./Complex psychosocial needs/coping issues

## 2021-09-08 NOTE — OCCUPATIONAL THERAPY INITIAL EVALUATION ADULT - PERTINENT HX OF CURRENT PROBLEM, REHAB EVAL
Pt is a 61 y/o female slated for elective surgery for right TKR with MD Husain on 9/8/21, due to OA, chronic pain and DJD. Pt reported buckling in her right knee, but denied any  falls in the past 3-6 months Pt is a 61 y/o female slated for elective surgery for right TKR with MD Husain on 9/8/21, due to OA, chronic pain and DJD. Pt reported buckling in her right knee, but denied any falls in the past 3-6 months

## 2021-09-08 NOTE — H&P PST ADULT - ASSESSMENT
60 year old female with a past medical history of HTN, GERD, DM c/o pain swelling, and limited ROM to b/l  knees (R>L)secondary to osteoarthritis  She is scheduled for a robotic assisted right total knee arthroplasty with ERYN.    CAPRINI SCORE [CLOT]    AGE RELATED RISK FACTORS                                                       MOBILITY RELATED FACTORS  [x ] Age 41-60 years                                            (1 Point)                  [ ] Bed rest                                                        (1 Point)  [ ] Age: 61-74 years                                           (2 Points)                 [ ] Plaster cast                                                   (2 Points)  [ ] Age= 75 years                                              (3 Points)                 [ ] Bed bound for more than 72 hours                 (2 Points)    DISEASE RELATED RISK FACTORS                                               GENDER SPECIFIC FACTORS  [x ] Edema in the lower extremities                       (1 Point)                  [ ] Pregnancy                                                     (1 Point)  [ ] Varicose veins                                               (1 Point)                  [ ] Post-partum < 6 weeks                                   (1 Point)             [x ] BMI > 25 Kg/m2                                            (1 Point)                  [ ] Hormonal therapy  or oral contraception          (1 Point)                 [ ] Sepsis (in the previous month)                        (1 Point)                  [ ] History of pregnancy complications                 (1 point)  [ ] Pneumonia or serious lung disease                                               [ ] Unexplained or recurrent                     (1 Point)           (in the previous month)                               (1 Point)  [ ] Abnormal pulmonary function test                     (1 Point)                 SURGERY RELATED RISK FACTORS  [ ] Acute myocardial infarction                              (1 Point)                 [ ]  Section                                             (1 Point)  [ ] Congestive heart failure (in the previous month)  (1 Point)               [ ] Minor surgery                                                  (1 Point)   [ ] Inflammatory bowel disease                             (1 Point)                 [ ] Arthroscopic surgery                                        (2 Points)  [ ] Central venous access                                      (2 Points)                [ ] General surgery lasting more than 45 minutes   (2 Points)       [ ] Stroke (in the previous month)                          (5 Points)               x[ ] Elective arthroplasty                                         (5 Points)                                                                                                                                               HEMATOLOGY RELATED FACTORS                                                 TRAUMA RELATED RISK FACTORS  [ ] Prior episodes of VTE                                     (3 Points)                [ ] Fracture of the hip, pelvis, or leg                       (5 Points)  [ ] Positive family history for VTE                         (3 Points)                 [ ] Acute spinal cord injury (in the previous month)  (5 Points)  [ ] Prothrombin 34840 A                                     (3 Points)                 [ ] Paralysis  (less than 1 month)                             (5 Points)  [ ] Factor V Leiden                                             (3 Points)                  [ ] Multiple Trauma within 1 month                        (5 Points)  [ ] Lupus anticoagulants                                     (3 Points)                                                           [ ] Anticardiolipin antibodies                               (3 Points)                                                       [ ] High homocysteine in the blood                      (3 Points)                                             [ ] Other congenital or acquired thrombophilia      (3 Points)                                                [ ] Heparin induced thrombocytopenia                  (3 Points)                                          Total Score [ 8         ]      Caprini score indicates that the patient is high risk for VTE event ( score 6 or greater). Surgical patient's in this group will benefit from both pharmacologic prophylaxis and intermittent compression devices . Surgical team will determine the balance between VTE  risk and bleeding risk and other clinical considerations     Caprini Score 0 - 2:  Low Risk, No VTE Prophylaxis required for most patients, encourage ambulation  Caprini Score 3 - 6:  At Risk, pharmacologic VTE prophylaxis is indicated for most patients (in the absence of a contraindication)  Caprini Score Greater than or = 7:  High Risk, pharmacologic VTE prophylaxis is indicated for most patients (in the absence of a contraindication)

## 2021-09-08 NOTE — OCCUPATIONAL THERAPY INITIAL EVALUATION ADULT - LIVES WITH, PROFILE
daughter and granddaughter in a private house with 5 steps to enter, equipped with bilateral hand rails that cannot be reached simultaneously . Once inside, pt has to negotiate a flight of stairs with 13 steps , right ascending handrail,  to access the bedroom and bathroom.  The bathroom has a tub/shower combination, fixed / retractable shower head and standard toilet with adequate space to fit a commode over it. . daughter and granddaughter in a private house with 5 steps to enter, equipped with bilateral hand rails that cannot be reached simultaneously. Once inside, pt has to negotiate a flight of stairs with 13 steps, right ascending handrail, to access the bedroom and bathroom.  The bathroom has a tub/shower combination, fixed / retractable shower head and standard toilet with adequate space to fit a commode over it. .

## 2021-09-08 NOTE — PHYSICAL THERAPY INITIAL EVALUATION ADULT - PERTINENT HX OF CURRENT PROBLEM, REHAB EVAL
Patient attends pre-op testing today following consult c Dr. Husain due to chronic pain to right knee. Elective R TKA is now scheduled in this facility for 9/30/2021.

## 2021-09-08 NOTE — OCCUPATIONAL THERAPY INITIAL EVALUATION ADULT - RANGE OF MOTION EXAMINATION, LOWER EXTREMITY
ROM is limited in right knee due to pain/Left LE Active ROM was WFL (within functional limits)/Left LE Passive ROM was WFL (w/i functional limits)/Right LE Passive ROM was WFL  (within functional limits)/Right LE Active Assistive ROM was WFL  (within functional limits)

## 2021-09-08 NOTE — PHYSICAL THERAPY INITIAL EVALUATION ADULT - MODIFIED CLINICAL TEST OF SENSORY INTEGRATION IN BALANCE TEST
30 second Sit to Stand Test: (reps: 7, adaptation: uses armrest) ; One Leg Stand Test (OLST): Right: 13 secs, Left: 10 secs.

## 2021-09-08 NOTE — OCCUPATIONAL THERAPY INITIAL EVALUATION ADULT - PRECAUTIONS/LIMITATIONS, REHAB EVAL
Pt has no known precautions but  has a history of multiple comorbidities./fall precautions/obesity precautions

## 2021-09-08 NOTE — OCCUPATIONAL THERAPY INITIAL EVALUATION ADULT - ANTICIPATED DISCHARGE DISPOSITION, OT EVAL
Recommend home with rolling walker and OT referral to enable patient to safely perform ADL management and functional mobility.  Pt owns a 3-in-1 commode and SAC. Both are in good working conditions and easily accessible

## 2021-09-08 NOTE — OCCUPATIONAL THERAPY INITIAL EVALUATION ADULT - ADDITIONAL COMMENTS
Presently, pt is functioning in her roles, self sufficient, driving & ambulating independently in the community without any assistive devices. Pt uses a SAC for long distance and when she experiences exacerbation of pain.  Currently, pt c/o 5/10 pain in her right knee  at rest and 10/10 at worse. The pain is exacerbated, by walking, prolonged standing, negotiating steps and is relieved with  Gabapentin, Duexis and Vicodin. Pt stated she has been on pain management since 2012. Pt scores 90% of patient specific scale. Presently, pt is functioning in her roles, self sufficient, driving & ambulating independently in the community without any assistive devices. Pt uses a SAC for long distance and when she experiences exacerbation of pain. Currently, pt c/o 5/10 pain in her right knee at rest and 10/10 at worse. The pain is exacerbated, by walking, prolonged standing, negotiating steps and is relieved with  Gabapentin, Duexis and Vicodin. Pt stated she has been on pain management since 2012. Pt scores 90% of patient specific scale.

## 2021-09-08 NOTE — PHYSICAL THERAPY INITIAL EVALUATION ADULT - RANGE OF MOTION EXAMINATION, REHAB EVAL
except right knee flexion (0-70 degrees), and L knee flexion (0-100 degrees)./bilateral upper extremity ROM was WNL (within normal limits)/bilateral lower extremity was ROM was WNL (within normal limits)/deficits as listed below

## 2021-09-08 NOTE — H&P PST ADULT - HISTORY OF PRESENT ILLNESS
60 year old female with a past medical history of HTN, GERD, and  DM c/o pain swelling, and limited ROM to b/l  knees (R>L)secondary to osteoarthritis  She is scheduled for a robotic assisted right total knee arthroplasty with ERYN.    She denies fever, cough, SOB, and sick contacts.    Recent travel to [place] in August

## 2021-09-09 LAB
MRSA PCR RESULT.: SIGNIFICANT CHANGE UP
S AUREUS DNA NOSE QL NAA+PROBE: SIGNIFICANT CHANGE UP

## 2021-09-22 PROBLEM — E66.9 OBESITY, UNSPECIFIED: Chronic | Status: ACTIVE | Noted: 2020-12-09

## 2021-09-26 DIAGNOSIS — Z01.818 ENCOUNTER FOR OTHER PREPROCEDURAL EXAMINATION: ICD-10-CM

## 2021-09-27 ENCOUNTER — APPOINTMENT (OUTPATIENT)
Dept: DISASTER EMERGENCY | Facility: CLINIC | Age: 61
End: 2021-09-27

## 2021-09-28 LAB — SARS-COV-2 N GENE NPH QL NAA+PROBE: NOT DETECTED

## 2021-09-29 ENCOUNTER — TRANSCRIPTION ENCOUNTER (OUTPATIENT)
Age: 61
End: 2021-09-29

## 2021-09-30 ENCOUNTER — TRANSCRIPTION ENCOUNTER (OUTPATIENT)
Age: 61
End: 2021-09-30

## 2021-09-30 ENCOUNTER — INPATIENT (INPATIENT)
Facility: HOSPITAL | Age: 61
LOS: 0 days | Discharge: HOME | End: 2021-10-01
Attending: ORTHOPAEDIC SURGERY | Admitting: ORTHOPAEDIC SURGERY
Payer: COMMERCIAL

## 2021-09-30 ENCOUNTER — RESULT REVIEW (OUTPATIENT)
Age: 61
End: 2021-09-30

## 2021-09-30 VITALS
HEART RATE: 58 BPM | RESPIRATION RATE: 18 BRPM | OXYGEN SATURATION: 100 % | DIASTOLIC BLOOD PRESSURE: 86 MMHG | WEIGHT: 222.89 LBS | HEIGHT: 66 IN | SYSTOLIC BLOOD PRESSURE: 148 MMHG | TEMPERATURE: 99 F

## 2021-09-30 DIAGNOSIS — Z98.890 OTHER SPECIFIED POSTPROCEDURAL STATES: Chronic | ICD-10-CM

## 2021-09-30 DIAGNOSIS — Z98.1 ARTHRODESIS STATUS: Chronic | ICD-10-CM

## 2021-09-30 DIAGNOSIS — Z98.84 BARIATRIC SURGERY STATUS: Chronic | ICD-10-CM

## 2021-09-30 DIAGNOSIS — Z87.19 PERSONAL HISTORY OF OTHER DISEASES OF THE DIGESTIVE SYSTEM: Chronic | ICD-10-CM

## 2021-09-30 LAB
ANION GAP SERPL CALC-SCNC: 4 MMOL/L — LOW (ref 5–17)
BUN SERPL-MCNC: 13 MG/DL — SIGNIFICANT CHANGE UP (ref 7–23)
CALCIUM SERPL-MCNC: 8.8 MG/DL — SIGNIFICANT CHANGE UP (ref 8.5–10.1)
CHLORIDE SERPL-SCNC: 109 MMOL/L — HIGH (ref 96–108)
CO2 SERPL-SCNC: 29 MMOL/L — SIGNIFICANT CHANGE UP (ref 22–31)
CREAT SERPL-MCNC: 0.66 MG/DL — SIGNIFICANT CHANGE UP (ref 0.5–1.3)
GLUCOSE BLDC GLUCOMTR-MCNC: 249 MG/DL — HIGH (ref 70–99)
GLUCOSE BLDC GLUCOMTR-MCNC: 88 MG/DL — SIGNIFICANT CHANGE UP (ref 70–99)
GLUCOSE SERPL-MCNC: 124 MG/DL — HIGH (ref 70–99)
HCT VFR BLD CALC: 35.9 % — SIGNIFICANT CHANGE UP (ref 34.5–45)
HGB BLD-MCNC: 11.4 G/DL — LOW (ref 11.5–15.5)
MCHC RBC-ENTMCNC: 27 PG — SIGNIFICANT CHANGE UP (ref 27–34)
MCHC RBC-ENTMCNC: 31.8 GM/DL — LOW (ref 32–36)
MCV RBC AUTO: 85.1 FL — SIGNIFICANT CHANGE UP (ref 80–100)
NRBC # BLD: 0 /100 WBCS — SIGNIFICANT CHANGE UP (ref 0–0)
PLATELET # BLD AUTO: 202 K/UL — SIGNIFICANT CHANGE UP (ref 150–400)
POTASSIUM SERPL-MCNC: 4 MMOL/L — SIGNIFICANT CHANGE UP (ref 3.5–5.3)
POTASSIUM SERPL-SCNC: 4 MMOL/L — SIGNIFICANT CHANGE UP (ref 3.5–5.3)
RBC # BLD: 4.22 M/UL — SIGNIFICANT CHANGE UP (ref 3.8–5.2)
RBC # FLD: 13.8 % — SIGNIFICANT CHANGE UP (ref 10.3–14.5)
SODIUM SERPL-SCNC: 142 MMOL/L — SIGNIFICANT CHANGE UP (ref 135–145)
WBC # BLD: 8.68 K/UL — SIGNIFICANT CHANGE UP (ref 3.8–10.5)
WBC # FLD AUTO: 8.68 K/UL — SIGNIFICANT CHANGE UP (ref 3.8–10.5)

## 2021-09-30 PROCEDURE — 88305 TISSUE EXAM BY PATHOLOGIST: CPT | Mod: 26

## 2021-09-30 PROCEDURE — 88311 DECALCIFY TISSUE: CPT | Mod: 26

## 2021-09-30 PROCEDURE — 73560 X-RAY EXAM OF KNEE 1 OR 2: CPT | Mod: 26,RT

## 2021-09-30 RX ORDER — MAGNESIUM HYDROXIDE 400 MG/1
30 TABLET, CHEWABLE ORAL DAILY
Refills: 0 | Status: DISCONTINUED | OUTPATIENT
Start: 2021-09-30 | End: 2021-10-01

## 2021-09-30 RX ORDER — INSULIN LISPRO 100/ML
VIAL (ML) SUBCUTANEOUS AT BEDTIME
Refills: 0 | Status: DISCONTINUED | OUTPATIENT
Start: 2021-09-30 | End: 2021-10-01

## 2021-09-30 RX ORDER — CHOLECALCIFEROL (VITAMIN D3) 125 MCG
2000 CAPSULE ORAL DAILY
Refills: 0 | Status: DISCONTINUED | OUTPATIENT
Start: 2021-09-30 | End: 2021-10-01

## 2021-09-30 RX ORDER — ASCORBIC ACID 60 MG
500 TABLET,CHEWABLE ORAL
Refills: 0 | Status: DISCONTINUED | OUTPATIENT
Start: 2021-09-30 | End: 2021-10-01

## 2021-09-30 RX ORDER — PANTOPRAZOLE SODIUM 20 MG/1
40 TABLET, DELAYED RELEASE ORAL
Refills: 0 | Status: DISCONTINUED | OUTPATIENT
Start: 2021-09-30 | End: 2021-10-01

## 2021-09-30 RX ORDER — SODIUM CHLORIDE 9 MG/ML
1000 INJECTION INTRAMUSCULAR; INTRAVENOUS; SUBCUTANEOUS
Refills: 0 | Status: DISCONTINUED | OUTPATIENT
Start: 2021-09-30 | End: 2021-10-01

## 2021-09-30 RX ORDER — DEXTROSE 50 % IN WATER 50 %
15 SYRINGE (ML) INTRAVENOUS ONCE
Refills: 0 | Status: DISCONTINUED | OUTPATIENT
Start: 2021-09-30 | End: 2021-10-01

## 2021-09-30 RX ORDER — SODIUM CHLORIDE 9 MG/ML
3 INJECTION INTRAMUSCULAR; INTRAVENOUS; SUBCUTANEOUS EVERY 8 HOURS
Refills: 0 | Status: DISCONTINUED | OUTPATIENT
Start: 2021-09-30 | End: 2021-09-30

## 2021-09-30 RX ORDER — ASPIRIN/CALCIUM CARB/MAGNESIUM 324 MG
81 TABLET ORAL
Refills: 0 | Status: DISCONTINUED | OUTPATIENT
Start: 2021-10-01 | End: 2021-10-01

## 2021-09-30 RX ORDER — GLUCAGON INJECTION, SOLUTION 0.5 MG/.1ML
1 INJECTION, SOLUTION SUBCUTANEOUS ONCE
Refills: 0 | Status: DISCONTINUED | OUTPATIENT
Start: 2021-09-30 | End: 2021-10-01

## 2021-09-30 RX ORDER — HYDROMORPHONE HYDROCHLORIDE 2 MG/ML
1 INJECTION INTRAMUSCULAR; INTRAVENOUS; SUBCUTANEOUS
Refills: 0 | Status: DISCONTINUED | OUTPATIENT
Start: 2021-09-30 | End: 2021-09-30

## 2021-09-30 RX ORDER — ZINC SULFATE TAB 220 MG (50 MG ZINC EQUIVALENT) 220 (50 ZN) MG
220 TAB ORAL DAILY
Refills: 0 | Status: DISCONTINUED | OUTPATIENT
Start: 2021-09-30 | End: 2021-10-01

## 2021-09-30 RX ORDER — DEXTROSE 50 % IN WATER 50 %
25 SYRINGE (ML) INTRAVENOUS ONCE
Refills: 0 | Status: DISCONTINUED | OUTPATIENT
Start: 2021-09-30 | End: 2021-10-01

## 2021-09-30 RX ORDER — ONDANSETRON 8 MG/1
4 TABLET, FILM COATED ORAL EVERY 8 HOURS
Refills: 0 | Status: DISCONTINUED | OUTPATIENT
Start: 2021-09-30 | End: 2021-10-01

## 2021-09-30 RX ORDER — HYDROMORPHONE HYDROCHLORIDE 2 MG/ML
0.5 INJECTION INTRAMUSCULAR; INTRAVENOUS; SUBCUTANEOUS
Refills: 0 | Status: DISCONTINUED | OUTPATIENT
Start: 2021-09-30 | End: 2021-09-30

## 2021-09-30 RX ORDER — SIMETHICONE 80 MG/1
80 TABLET, CHEWABLE ORAL
Refills: 0 | Status: DISCONTINUED | OUTPATIENT
Start: 2021-09-30 | End: 2021-10-01

## 2021-09-30 RX ORDER — OXYCODONE HYDROCHLORIDE 5 MG/1
5 TABLET ORAL EVERY 4 HOURS
Refills: 0 | Status: DISCONTINUED | OUTPATIENT
Start: 2021-09-30 | End: 2021-10-01

## 2021-09-30 RX ORDER — ACETAMINOPHEN 500 MG
975 TABLET ORAL EVERY 8 HOURS
Refills: 0 | Status: DISCONTINUED | OUTPATIENT
Start: 2021-09-30 | End: 2021-10-01

## 2021-09-30 RX ORDER — SODIUM CHLORIDE 9 MG/ML
1000 INJECTION, SOLUTION INTRAVENOUS
Refills: 0 | Status: DISCONTINUED | OUTPATIENT
Start: 2021-09-30 | End: 2021-09-30

## 2021-09-30 RX ORDER — GABAPENTIN 400 MG/1
600 CAPSULE ORAL
Refills: 0 | Status: DISCONTINUED | OUTPATIENT
Start: 2021-09-30 | End: 2021-10-01

## 2021-09-30 RX ORDER — SODIUM CHLORIDE 9 MG/ML
1000 INJECTION, SOLUTION INTRAVENOUS
Refills: 0 | Status: DISCONTINUED | OUTPATIENT
Start: 2021-09-30 | End: 2021-10-01

## 2021-09-30 RX ORDER — CELECOXIB 200 MG/1
200 CAPSULE ORAL ONCE
Refills: 0 | Status: COMPLETED | OUTPATIENT
Start: 2021-09-30 | End: 2021-09-30

## 2021-09-30 RX ORDER — DEXAMETHASONE 0.5 MG/5ML
10 ELIXIR ORAL ONCE
Refills: 0 | Status: COMPLETED | OUTPATIENT
Start: 2021-10-01 | End: 2021-10-01

## 2021-09-30 RX ORDER — ACETAMINOPHEN 500 MG
1000 TABLET ORAL ONCE
Refills: 0 | Status: DISCONTINUED | OUTPATIENT
Start: 2021-09-30 | End: 2021-10-01

## 2021-09-30 RX ORDER — METOPROLOL TARTRATE 50 MG
25 TABLET ORAL
Refills: 0 | Status: DISCONTINUED | OUTPATIENT
Start: 2021-09-30 | End: 2021-10-01

## 2021-09-30 RX ORDER — POLYETHYLENE GLYCOL 3350 17 G/17G
17 POWDER, FOR SOLUTION ORAL AT BEDTIME
Refills: 0 | Status: DISCONTINUED | OUTPATIENT
Start: 2021-09-30 | End: 2021-10-01

## 2021-09-30 RX ORDER — BENZOCAINE AND MENTHOL 5; 1 G/100ML; G/100ML
1 LIQUID ORAL EVERY 4 HOURS
Refills: 0 | Status: DISCONTINUED | OUTPATIENT
Start: 2021-09-30 | End: 2021-10-01

## 2021-09-30 RX ORDER — ONDANSETRON 8 MG/1
4 TABLET, FILM COATED ORAL ONCE
Refills: 0 | Status: DISCONTINUED | OUTPATIENT
Start: 2021-09-30 | End: 2021-09-30

## 2021-09-30 RX ORDER — LANOLIN ALCOHOL/MO/W.PET/CERES
3 CREAM (GRAM) TOPICAL AT BEDTIME
Refills: 0 | Status: DISCONTINUED | OUTPATIENT
Start: 2021-09-30 | End: 2021-10-01

## 2021-09-30 RX ORDER — ACETAMINOPHEN 500 MG
650 TABLET ORAL ONCE
Refills: 0 | Status: COMPLETED | OUTPATIENT
Start: 2021-09-30 | End: 2021-09-30

## 2021-09-30 RX ORDER — HYDROMORPHONE HYDROCHLORIDE 2 MG/ML
1 INJECTION INTRAMUSCULAR; INTRAVENOUS; SUBCUTANEOUS EVERY 4 HOURS
Refills: 0 | Status: DISCONTINUED | OUTPATIENT
Start: 2021-09-30 | End: 2021-10-01

## 2021-09-30 RX ORDER — CELECOXIB 200 MG/1
200 CAPSULE ORAL EVERY 12 HOURS
Refills: 0 | Status: DISCONTINUED | OUTPATIENT
Start: 2021-10-01 | End: 2021-10-01

## 2021-09-30 RX ORDER — OXYCODONE HYDROCHLORIDE 5 MG/1
5 TABLET ORAL
Refills: 0 | Status: DISCONTINUED | OUTPATIENT
Start: 2021-09-30 | End: 2021-10-01

## 2021-09-30 RX ORDER — KETOROLAC TROMETHAMINE 30 MG/ML
30 SYRINGE (ML) INJECTION EVERY 8 HOURS
Refills: 0 | Status: COMPLETED | OUTPATIENT
Start: 2021-09-30 | End: 2021-10-01

## 2021-09-30 RX ORDER — SENNA PLUS 8.6 MG/1
2 TABLET ORAL AT BEDTIME
Refills: 0 | Status: DISCONTINUED | OUTPATIENT
Start: 2021-09-30 | End: 2021-10-01

## 2021-09-30 RX ORDER — OXYCODONE HYDROCHLORIDE 5 MG/1
10 TABLET ORAL
Refills: 0 | Status: DISCONTINUED | OUTPATIENT
Start: 2021-09-30 | End: 2021-10-01

## 2021-09-30 RX ORDER — CEFAZOLIN SODIUM 1 G
2000 VIAL (EA) INJECTION EVERY 8 HOURS
Refills: 0 | Status: COMPLETED | OUTPATIENT
Start: 2021-09-30 | End: 2021-10-01

## 2021-09-30 RX ORDER — DEXTROSE 50 % IN WATER 50 %
12.5 SYRINGE (ML) INTRAVENOUS ONCE
Refills: 0 | Status: DISCONTINUED | OUTPATIENT
Start: 2021-09-30 | End: 2021-10-01

## 2021-09-30 RX ORDER — INSULIN LISPRO 100/ML
VIAL (ML) SUBCUTANEOUS
Refills: 0 | Status: DISCONTINUED | OUTPATIENT
Start: 2021-09-30 | End: 2021-10-01

## 2021-09-30 RX ADMIN — GABAPENTIN 600 MILLIGRAM(S): 400 CAPSULE ORAL at 17:43

## 2021-09-30 RX ADMIN — Medication 500 MILLIGRAM(S): at 17:42

## 2021-09-30 RX ADMIN — SODIUM CHLORIDE 125 MILLILITER(S): 9 INJECTION INTRAMUSCULAR; INTRAVENOUS; SUBCUTANEOUS at 21:55

## 2021-09-30 RX ADMIN — OXYCODONE HYDROCHLORIDE 10 MILLIGRAM(S): 5 TABLET ORAL at 18:44

## 2021-09-30 RX ADMIN — OXYCODONE HYDROCHLORIDE 5 MILLIGRAM(S): 5 TABLET ORAL at 23:00

## 2021-09-30 RX ADMIN — Medication 100 MILLIGRAM(S): at 21:55

## 2021-09-30 RX ADMIN — SENNA PLUS 2 TABLET(S): 8.6 TABLET ORAL at 22:01

## 2021-09-30 RX ADMIN — CELECOXIB 200 MILLIGRAM(S): 200 CAPSULE ORAL at 12:24

## 2021-09-30 RX ADMIN — OXYCODONE HYDROCHLORIDE 5 MILLIGRAM(S): 5 TABLET ORAL at 22:01

## 2021-09-30 RX ADMIN — Medication 30 MILLIGRAM(S): at 20:56

## 2021-09-30 RX ADMIN — Medication 650 MILLIGRAM(S): at 12:24

## 2021-09-30 RX ADMIN — POLYETHYLENE GLYCOL 3350 17 GRAM(S): 17 POWDER, FOR SOLUTION ORAL at 22:01

## 2021-09-30 RX ADMIN — Medication 975 MILLIGRAM(S): at 23:00

## 2021-09-30 RX ADMIN — Medication 975 MILLIGRAM(S): at 22:01

## 2021-09-30 RX ADMIN — SODIUM CHLORIDE 3 MILLILITER(S): 9 INJECTION INTRAMUSCULAR; INTRAVENOUS; SUBCUTANEOUS at 12:24

## 2021-09-30 RX ADMIN — OXYCODONE HYDROCHLORIDE 10 MILLIGRAM(S): 5 TABLET ORAL at 17:42

## 2021-09-30 RX ADMIN — SODIUM CHLORIDE 100 MILLILITER(S): 9 INJECTION, SOLUTION INTRAVENOUS at 16:37

## 2021-09-30 RX ADMIN — Medication 30 MILLIGRAM(S): at 21:11

## 2021-09-30 NOTE — DISCHARGE NOTE PROVIDER - HOSPITAL COURSE
60yFemale with history of Right Knee Osteoarthritis presenting for Right TKA by Dr. Husain on 9/30/21. Risk and benefits of surgery were explained to the patient.The patient understood and agreed to proceed with surgery. Patient underwent the procedure with no intraoperative complications. Pt was brought in stable condition to the PACU. Once stable in PACU, pt was brought to the floor. During hospital stay pt was followed by Medicine, physical therapy, Home Care during this admission. Pt had an uneventful hospital course. Pt is stable for discharge to home

## 2021-09-30 NOTE — OCCUPATIONAL THERAPY INITIAL EVALUATION ADULT - BALANCE TRAINING, PT EVAL
Patient will increase static/dynamic balance standing to good in order to perform toilet transfer independently within 3 weeks

## 2021-09-30 NOTE — PHYSICAL THERAPY INITIAL EVALUATION ADULT - ACTIVE RANGE OF MOTION EXAMINATION, REHAB EVAL
R knee 5-85/bilateral upper extremity Active ROM was WFL (within functional limits)/bilateral  lower extremity Active ROM was WFL (within functional limits)/deficits as listed below

## 2021-09-30 NOTE — DISCHARGE NOTE PROVIDER - NSDCCPTREATMENT_GEN_ALL_CORE_FT
PRINCIPAL PROCEDURE  Procedure: Arthroplasty, knee, total, robot-assisted, using ERYN system  Findings and Treatment:

## 2021-09-30 NOTE — PHYSICAL THERAPY INITIAL EVALUATION ADULT - GAIT TRAINING, PT EVAL
Patient will ambulate 500 feet with rolling walker independently for community ambulation in 2-3 days. Patient will ascend/descend 18 steps with R rail up/L rail down sidestepping independently in 2-3 days to safely navigate home environment.

## 2021-09-30 NOTE — PHYSICAL THERAPY INITIAL EVALUATION ADULT - RANGE OF MOTION, PT EVAL
Patient will improve ROM in R knee to WNL in 6 weeks in order to improve gait quality, functional mobility and overall safety.

## 2021-09-30 NOTE — OCCUPATIONAL THERAPY INITIAL EVALUATION ADULT - ADDITIONAL COMMENTS
Pre-op confirmed:  Patient lives with daughter and granddaughter in a private house with 5 steps to enter, equipped with bilateral hand rails that cannot be reached simultaneously. Once inside, pt has to negotiate a flight of stairs with 13 steps, right ascending handrail, to access the bedroom and bathroom.  The bathroom has a tub/shower combination, fixed / retractable shower head and standard toilet with adequate space to fit a commode over it. .Presently, pt is functioning in her roles, self sufficient, driving & ambulating independently in the community without any assistive devices. Pt uses a SAC for long distance and when she experiences exacerbation of pain.

## 2021-09-30 NOTE — BRIEF OPERATIVE NOTE - NSICDXBRIEFPROCEDURE_GEN_ALL_CORE_FT
PROCEDURES:  Arthroplasty, knee, total, robot-assisted, using ERYN system 30-Sep-2021 15:18:45  Amy Galindo

## 2021-09-30 NOTE — DISCHARGE NOTE PROVIDER - NSDCFUADDINST_GEN_ALL_CORE_FT
Keep Prineo Dressing Clean, Dry and Intact. May shower with Prineo Dressing. Please do not scrub, soak, peel or pick at the prineo dressing. No creams, lotions, or oils over dressing. May shower and let water run over incision, no baths. Pat dry once out of shower. Dressing to be removed in office at follow up visit in 2 weeks.  Keep Prineo Dressing Clean, Dry and Intact. May shower with Prineo Dressing. Please do not scrub, soak, peel or pick at the prineo dressing. No creams, lotions, or oils over dressing. May shower and let water run over incision, no baths. Pat dry once out of shower. Dressing to be removed in office at follow up visit in 2 weeks. There are no staples or stitches that need to be removed.  If you notice any redness, irritation, or itching around the prineo dressing call the surgeon's office    Per Dr. Husain: may advance from walker as tolerated per discretion of physical therapist.     Keep knee straight while at rest. Elevate the leg as much as possible ("toes above the nose") to help control swelling. Make sure you get up and take a brief walk every two hours to help with circulation and prevent stiffness. Incentive spirometer 10X/hour. Cryocuff to help with pain/inflammation.

## 2021-09-30 NOTE — PHYSICAL THERAPY INITIAL EVALUATION ADULT - GAIT DEVIATIONS NOTED, PT EVAL
antalgic/decreased coty/decreased step length/decreased stride length/decreased weight-shifting ability

## 2021-09-30 NOTE — PHYSICAL THERAPY INITIAL EVALUATION ADULT - ADDITIONAL COMMENTS
As per pre-op and reviewed with patient POD #0: Pt lives with her daughter and granddaughter (whom can provide assist upon D/C home) in a private home, 4 entry steps c B/L rails(far apart), 13 steps c R rail up. Pt has a tub/shower combo with a retractable shower head, standard toilet seat height, & no grab bar. Pt states she is currently independent with all functional mobility including community ambulation with straight cane. Pt also owns straight cane & commode (both in good working condition & easily accessible). Patient received rolling walker. Pt states she is independent with ADL's as well. Pt reports daily 5/10 pain at rest & states it is worse with any activity 10/10. Pt is right hand dominant, wears eye glasses, drives, & is currently working.  Pt reports she has the most difficulty time "getting up & moving around" after prolonged sitting. Pt stated taking vicodin for pain management. Goal of therapy: manage pain & improve functional mobility.

## 2021-09-30 NOTE — DISCHARGE NOTE PROVIDER - NSDCFUADDAPPT_GEN_ALL_CORE_FT
Follow up with your surgeon in two weeks. Call for appointment.    If you need more pain medications, call your surgeon's office. For medication refills or authorizations call [phone]    Call and schedule a follow up appointment with your primary care physician for repeat blood work (CBC and BMP) for post hospital discharge follow-up care.    Call your surgeon if you have increased redness/pain/drainage or fever. Return to ER for shortness of breath/calf tenderness.

## 2021-09-30 NOTE — OCCUPATIONAL THERAPY INITIAL EVALUATION ADULT - GENERAL OBSERVATIONS, REHAB EVAL
Chart reviewed and event noted to date. Patient encountered supine in bed, NAD, A&Ox4, +pneumatic TEDs, +cryo cuff, +IV heplock, +ace wrap on R knee C/D/I, c/o pain in R knee 7/10 at rest and 9/10 w/movement s/p R TKA; w/PT Lorie and daughter bedside.

## 2021-09-30 NOTE — DISCHARGE NOTE PROVIDER - NSDCMRMEDTOKEN_GEN_ALL_CORE_FT
Calcium 600+D oral tablet: 1 tab(s) orally once a day  Duexis: 500 milligram(s) orally once a day, As Needed  gabapentin 600 mg oral tablet: 1  orally 2 times a day, As Needed  metoprolol tartrate 25 mg oral tablet: 1 tab(s) orally 2 times a day  Refresh ophthalmic solution: 1 drop(s) to each affected eye 4 times a day, As Needed - continue  repaglinide 0.5 mg oral tablet: 1  orally 2 times a day  simethicone 80 mg oral tablet, chewable: 1 tab(s) orally 4 times a day  traMADol 50 mg oral tablet:   Vicodin 5 mg-300 mg oral tablet: 1 tab(s) orally every 6 hours, As Needed  Vitamin B-100 oral tablet: 1 tab(s) orally once a day - switch to chewable  Vitamin C 1000 mg oral tablet: 1 tab(s) orally once a day - switch to chewable  Vitamin D3 2000 intl units (50 mcg) oral tablet: 1 tab(s) orally once a day - switch to chewable  Zinc 140 mg (as elemental zinc 50 mg) oral tablet: 1 tab(s) orally once a day - discontinued by patient   acetaminophen 325 mg oral tablet: 3 tab(s) orally every 8 hours as needed for pain/fever  aspirin 81 mg oral delayed release tablet: 1 tab(s) orally 2 times a day MDD:2 tablets  Calcium 600+D oral tablet: 1 tab(s) orally once a day  celecoxib 200 mg oral capsule: 1 cap(s) orally every 12 hours MDD:2 tablets  gabapentin 600 mg oral tablet: 1  orally 2 times a day, As Needed  metoprolol tartrate 25 mg oral tablet: 1 tab(s) orally 2 times a day  oxyCODONE 10 mg oral tablet: 1 tab(s) orally every 4 hours, As Needed -Severe Pain (7 - 10) MDD:6 tablets  pantoprazole 40 mg oral delayed release tablet: 1 tab(s) orally once a day (before a meal)  Refresh ophthalmic solution: 1 drop(s) to each affected eye 4 times a day, As Needed - continue  repaglinide 0.5 mg oral tablet: 1  orally 2 times a day  senna oral tablet: 2 tab(s) orally once a day (at bedtime)  simethicone 80 mg oral tablet, chewable: 1 tab(s) orally 4 times a day  Vitamin B-100 oral tablet: 1 tab(s) orally once a day - switch to chewable  Vitamin C 1000 mg oral tablet: 1 tab(s) orally once a day - switch to chewable  Vitamin D3 2000 intl units (50 mcg) oral tablet: 1 tab(s) orally once a day - switch to chewable  Zinc 140 mg (as elemental zinc 50 mg) oral tablet: 1 tab(s) orally once a day - discontinued by patient

## 2021-09-30 NOTE — CONSULT NOTE ADULT - SUBJECTIVE AND OBJECTIVE BOX
MILADIS FRANCIS is a 60y Female s/p ROBOTIC ASSISTED RIGHT TOTAL KNEE ARTHROPLASTY WITH ERYN      w/ h/o WILLAM (obstructive sleep apnea)    HTN (hypertension)    Obesity    T2DM (type 2 diabetes mellitus)    OA (osteoarthritis)      denies any chest pain shortness of breath palpitation dizziness lightheadedness nausea vomiting fever or chills    S/P lumbar spinal fusion    S/P arthroscopy of knee    History of rectal abscess    H/O colonoscopy    H/O endoscopy    H/O bariatric surgery        SH: doesnot smoke or drink at this time    No Known Allergies    acetaminophen   Tablet .. 975 milliGRAM(s) Oral every 8 hours  acetaminophen  IVPB .. 1000 milliGRAM(s) IV Intermittent once  aluminum hydroxide/magnesium hydroxide/simethicone Suspension 30 milliLiter(s) Oral four times a day PRN  artificial  tears Solution 1 Drop(s) Both EYES four times a day PRN  ascorbic acid 500 milliGRAM(s) Oral two times a day  benzocaine 15 mG/menthol 3.6 mG (Sugar-Free) Lozenge 1 Lozenge Oral every 4 hours PRN  ceFAZolin   IVPB 2000 milliGRAM(s) IV Intermittent every 8 hours  cholecalciferol 2000 Unit(s) Oral daily  dextrose 40% Gel 15 Gram(s) Oral once  dextrose 5%. 1000 milliLiter(s) IV Continuous <Continuous>  dextrose 5%. 1000 milliLiter(s) IV Continuous <Continuous>  dextrose 50% Injectable 25 Gram(s) IV Push once  dextrose 50% Injectable 12.5 Gram(s) IV Push once  dextrose 50% Injectable 25 Gram(s) IV Push once  gabapentin 600 milliGRAM(s) Oral two times a day  glucagon  Injectable 1 milliGRAM(s) IntraMuscular once  HYDROmorphone  Injectable 1 milliGRAM(s) IV Push every 4 hours PRN  insulin lispro (ADMELOG) corrective regimen sliding scale   SubCutaneous three times a day before meals  insulin lispro (ADMELOG) corrective regimen sliding scale   SubCutaneous at bedtime  ketorolac   Injectable 30 milliGRAM(s) IV Push every 8 hours  magnesium hydroxide Suspension 30 milliLiter(s) Oral daily PRN  melatonin 3 milliGRAM(s) Oral at bedtime PRN  metoprolol tartrate 25 milliGRAM(s) Oral two times a day  multivitamin 1 Tablet(s) Oral daily  ondansetron Injectable 4 milliGRAM(s) IV Push every 8 hours PRN  oxyCODONE    IR 5 milliGRAM(s) Oral every 3 hours PRN  oxyCODONE    IR 10 milliGRAM(s) Oral every 3 hours PRN  oxyCODONE    IR 5 milliGRAM(s) Oral every 4 hours  pantoprazole    Tablet 40 milliGRAM(s) Oral before breakfast  polyethylene glycol 3350 17 Gram(s) Oral at bedtime  senna 2 Tablet(s) Oral at bedtime  simethicone 80 milliGRAM(s) Chew four times a day PRN  sodium chloride 0.9%. 1000 milliLiter(s) IV Continuous <Continuous>  zinc sulfate 220 milliGRAM(s) Oral daily    T(C): 36.6 (09-30-21 @ 17:08), Max: 37.1 (09-30-21 @ 11:34)  HR: 73 (09-30-21 @ 17:08) (56 - 73)  BP: 133/72 (09-30-21 @ 17:08) (133/72 - 149/74)  RR: 18 (09-30-21 @ 17:08) (13 - 18)  SpO2: 98% (09-30-21 @ 17:08) (98% - 100%)  HEENT unremarkable  neck no JVD or bruit  heart normal S1 S2 RRR no gallops or rubs  chest clear to auscultation  abd sof nontender non distended +bs  ext no calf tenderness    A/P   DVT PX  pain control  bowel regimen   wound care as per ortho  GI PX  antiemetics prn  incentive spirometer

## 2021-10-01 ENCOUNTER — TRANSCRIPTION ENCOUNTER (OUTPATIENT)
Age: 61
End: 2021-10-01

## 2021-10-01 VITALS
RESPIRATION RATE: 17 BRPM | OXYGEN SATURATION: 99 % | SYSTOLIC BLOOD PRESSURE: 131 MMHG | DIASTOLIC BLOOD PRESSURE: 77 MMHG | TEMPERATURE: 98 F | HEART RATE: 72 BPM

## 2021-10-01 LAB
ANION GAP SERPL CALC-SCNC: 5 MMOL/L — SIGNIFICANT CHANGE UP (ref 5–17)
BUN SERPL-MCNC: 13 MG/DL — SIGNIFICANT CHANGE UP (ref 7–23)
CALCIUM SERPL-MCNC: 8.6 MG/DL — SIGNIFICANT CHANGE UP (ref 8.5–10.1)
CHLORIDE SERPL-SCNC: 106 MMOL/L — SIGNIFICANT CHANGE UP (ref 96–108)
CO2 SERPL-SCNC: 30 MMOL/L — SIGNIFICANT CHANGE UP (ref 22–31)
COVID-19 SPIKE DOMAIN AB INTERP: POSITIVE
COVID-19 SPIKE DOMAIN ANTIBODY RESULT: 135 U/ML — HIGH
CREAT SERPL-MCNC: 0.73 MG/DL — SIGNIFICANT CHANGE UP (ref 0.5–1.3)
GLUCOSE BLDC GLUCOMTR-MCNC: 133 MG/DL — HIGH (ref 70–99)
GLUCOSE BLDC GLUCOMTR-MCNC: 144 MG/DL — HIGH (ref 70–99)
GLUCOSE BLDC GLUCOMTR-MCNC: 170 MG/DL — HIGH (ref 70–99)
GLUCOSE SERPL-MCNC: 133 MG/DL — HIGH (ref 70–99)
HCT VFR BLD CALC: 36 % — SIGNIFICANT CHANGE UP (ref 34.5–45)
HGB BLD-MCNC: 11.7 G/DL — SIGNIFICANT CHANGE UP (ref 11.5–15.5)
MCHC RBC-ENTMCNC: 27.4 PG — SIGNIFICANT CHANGE UP (ref 27–34)
MCHC RBC-ENTMCNC: 32.5 GM/DL — SIGNIFICANT CHANGE UP (ref 32–36)
MCV RBC AUTO: 84.3 FL — SIGNIFICANT CHANGE UP (ref 80–100)
NRBC # BLD: 0 /100 WBCS — SIGNIFICANT CHANGE UP (ref 0–0)
PLATELET # BLD AUTO: 227 K/UL — SIGNIFICANT CHANGE UP (ref 150–400)
POTASSIUM SERPL-MCNC: 4.6 MMOL/L — SIGNIFICANT CHANGE UP (ref 3.5–5.3)
POTASSIUM SERPL-SCNC: 4.6 MMOL/L — SIGNIFICANT CHANGE UP (ref 3.5–5.3)
RBC # BLD: 4.27 M/UL — SIGNIFICANT CHANGE UP (ref 3.8–5.2)
RBC # FLD: 14 % — SIGNIFICANT CHANGE UP (ref 10.3–14.5)
SARS-COV-2 IGG+IGM SERPL QL IA: 135 U/ML — HIGH
SARS-COV-2 IGG+IGM SERPL QL IA: POSITIVE
SODIUM SERPL-SCNC: 141 MMOL/L — SIGNIFICANT CHANGE UP (ref 135–145)
WBC # BLD: 10.17 K/UL — SIGNIFICANT CHANGE UP (ref 3.8–10.5)
WBC # FLD AUTO: 10.17 K/UL — SIGNIFICANT CHANGE UP (ref 3.8–10.5)

## 2021-10-01 RX ORDER — OXYCODONE HYDROCHLORIDE 5 MG/1
1 TABLET ORAL
Qty: 42 | Refills: 0
Start: 2021-10-01 | End: 2021-10-07

## 2021-10-01 RX ORDER — CELECOXIB 200 MG/1
1 CAPSULE ORAL
Qty: 60 | Refills: 0
Start: 2021-10-01 | End: 2021-10-30

## 2021-10-01 RX ORDER — PANTOPRAZOLE SODIUM 20 MG/1
1 TABLET, DELAYED RELEASE ORAL
Qty: 30 | Refills: 0
Start: 2021-10-01 | End: 2021-10-30

## 2021-10-01 RX ORDER — ACETAMINOPHEN 500 MG
3 TABLET ORAL
Qty: 0 | Refills: 0 | DISCHARGE
Start: 2021-10-01

## 2021-10-01 RX ORDER — TRAMADOL HYDROCHLORIDE 50 MG/1
0 TABLET ORAL
Qty: 0 | Refills: 0 | DISCHARGE

## 2021-10-01 RX ORDER — ASPIRIN/CALCIUM CARB/MAGNESIUM 324 MG
1 TABLET ORAL
Qty: 60 | Refills: 0
Start: 2021-10-01 | End: 2021-10-30

## 2021-10-01 RX ORDER — IBUPROFEN AND FAMOTIDINE 26.6; 8 MG/1; MG/1
500 TABLET, FILM COATED ORAL
Qty: 0 | Refills: 0 | DISCHARGE

## 2021-10-01 RX ORDER — SENNA PLUS 8.6 MG/1
2 TABLET ORAL
Qty: 0 | Refills: 0 | DISCHARGE
Start: 2021-10-01

## 2021-10-01 RX ADMIN — Medication 500 MILLIGRAM(S): at 06:05

## 2021-10-01 RX ADMIN — Medication 2000 UNIT(S): at 12:17

## 2021-10-01 RX ADMIN — Medication 975 MILLIGRAM(S): at 16:25

## 2021-10-01 RX ADMIN — OXYCODONE HYDROCHLORIDE 10 MILLIGRAM(S): 5 TABLET ORAL at 16:25

## 2021-10-01 RX ADMIN — OXYCODONE HYDROCHLORIDE 5 MILLIGRAM(S): 5 TABLET ORAL at 10:40

## 2021-10-01 RX ADMIN — OXYCODONE HYDROCHLORIDE 5 MILLIGRAM(S): 5 TABLET ORAL at 09:44

## 2021-10-01 RX ADMIN — ZINC SULFATE TAB 220 MG (50 MG ZINC EQUIVALENT) 220 MILLIGRAM(S): 220 (50 ZN) TAB at 12:17

## 2021-10-01 RX ADMIN — OXYCODONE HYDROCHLORIDE 5 MILLIGRAM(S): 5 TABLET ORAL at 02:58

## 2021-10-01 RX ADMIN — OXYCODONE HYDROCHLORIDE 5 MILLIGRAM(S): 5 TABLET ORAL at 03:50

## 2021-10-01 RX ADMIN — Medication 102 MILLIGRAM(S): at 06:58

## 2021-10-01 RX ADMIN — Medication 30 MILLIGRAM(S): at 12:16

## 2021-10-01 RX ADMIN — Medication 81 MILLIGRAM(S): at 06:10

## 2021-10-01 RX ADMIN — Medication 100 MILLIGRAM(S): at 05:55

## 2021-10-01 RX ADMIN — OXYCODONE HYDROCHLORIDE 5 MILLIGRAM(S): 5 TABLET ORAL at 07:05

## 2021-10-01 RX ADMIN — CELECOXIB 200 MILLIGRAM(S): 200 CAPSULE ORAL at 07:05

## 2021-10-01 RX ADMIN — Medication 975 MILLIGRAM(S): at 07:05

## 2021-10-01 RX ADMIN — GABAPENTIN 600 MILLIGRAM(S): 400 CAPSULE ORAL at 06:06

## 2021-10-01 RX ADMIN — Medication 1 TABLET(S): at 12:17

## 2021-10-01 RX ADMIN — Medication 975 MILLIGRAM(S): at 06:05

## 2021-10-01 RX ADMIN — OXYCODONE HYDROCHLORIDE 5 MILLIGRAM(S): 5 TABLET ORAL at 06:05

## 2021-10-01 RX ADMIN — Medication 30 MILLIGRAM(S): at 05:02

## 2021-10-01 RX ADMIN — Medication 30 MILLIGRAM(S): at 12:30

## 2021-10-01 RX ADMIN — OXYCODONE HYDROCHLORIDE 10 MILLIGRAM(S): 5 TABLET ORAL at 17:25

## 2021-10-01 RX ADMIN — CELECOXIB 200 MILLIGRAM(S): 200 CAPSULE ORAL at 06:06

## 2021-10-01 RX ADMIN — Medication 975 MILLIGRAM(S): at 17:25

## 2021-10-01 RX ADMIN — Medication 25 MILLIGRAM(S): at 06:06

## 2021-10-01 RX ADMIN — Medication 30 MILLIGRAM(S): at 05:17

## 2021-10-01 RX ADMIN — PANTOPRAZOLE SODIUM 40 MILLIGRAM(S): 20 TABLET, DELAYED RELEASE ORAL at 06:05

## 2021-10-01 NOTE — DISCHARGE NOTE NURSING/CASE MANAGEMENT/SOCIAL WORK - PATIENT PORTAL LINK FT
You can access the FollowMyHealth Patient Portal offered by [facility] by registering at the following website: [email or web address]. By joining [employer]’s FollowMyHealth portal, you will also be able to view your health information using other applications (apps) compatible with our system.
yes

## 2021-10-01 NOTE — DISCHARGE NOTE NURSING/CASE MANAGEMENT/SOCIAL WORK - NSDCFUADDAPPT_GEN_ALL_CORE_FT
Follow up with your surgeon in two weeks. Call for appointment.    If you need more pain medications, call your surgeon's office. For medication refills or authorizations call [phone]    Call and schedule a follow up appointment with your primary care physician for repeat blood work (CBC and BMP) for post hospital discharge follow-up care.    Call your surgeon if you have increased redness/pain/drainage or fever. Return to ER for shortness of breath/calf tenderness.

## 2021-10-01 NOTE — DISCHARGE NOTE NURSING/CASE MANAGEMENT/SOCIAL WORK - NSFLUVACAGEDISCH_IMM_ALL_CORE
Adult Recommendations (Free Text): Plan: Prescription creams are designed to stay on your skin and not be washed off\\n\\Myesha cosmetics that you use should be oil-free or \"non-comedogenic\", this includes make-up and moisturizers.\\n\\nBe careful of using shampoos and conditioners for dry hair, as these tend to have more oil in them and can exacerbate your acne.\\n\\nAvoid picking or popping pimples, as even though you may get material out, this can cause an increase in inflammation and result in scarring.\\n\\nTo avoid irritation while using a night Retinol, we recommend the following tips:\\n\\n1. Start by applying a very thin layer every 3rd night for 2 weeks, then increase frequency to every other night for 2 weeks, then increase to every night as tolerated.\\n\\n2. Do not use more than an English pea size amount for your entire face. Using more than this amount does not speed up how quickly the medicine will work, and only increases the likelihood of irritation.\\n\\n3. Some slight dryness, flaking and mild sensitivity to the areas you apply the cream is considered normal, however you may try applying an oil-free moisturizer to your skin immediately after washing, followed then by the retinol application. Detail Level: Zone

## 2021-10-01 NOTE — PROGRESS NOTE ADULT - SUBJECTIVE AND OBJECTIVE BOX
60yFemale s/p R TKA POD#1. Pt seen and examined in NAD. Pain controlled. Pt denies any new complaints. Pt denies CP/SOB/N/V/D/numbness/tingling/bowel or bladder dysfunction.     PE:   RLE: dressing c/d/i. +ROM ankle/toes. Calf: soft, compressible and nontender. DP/PT 2+ NVI.                           11.7   10.17 )-----------( 227      ( 01 Oct 2021 06:30 )             36.0       10-01    141  |  106  |  13  ----------------------------<  133<H>  4.6   |  30  |  0.73    Ca    8.6      01 Oct 2021 06:30        A/P: 60yFemale s/p R TKA POD#1  ACE removed.  Pain control prn  PT: WBAT   DVT ppx: SCDs and ASA BID  Wound care, Isometric exercises, incentive spirometry   Discharge: planning Home  All the above discussed and understood by pt   
MILADIS FRANCIS is a 60y Female s/p ROBOTIC ASSISTED RIGHT TOTAL KNEE ARTHROPLASTY WITH ERYN        denies any chest pain shortness of breath palpitation dizziness lightheadedness nausea vomiting fever or chills    T(C): 36.6 (10-01-21 @ 08:11), Max: 36.8 (09-30-21 @ 20:20)  HR: 59 (10-01-21 @ 11:20) (56 - 88)  BP: 131/78 (10-01-21 @ 11:20) (120/63 - 157/90)  RR: 18 (10-01-21 @ 08:11) (13 - 18)  SpO2: 98% (10-01-21 @ 11:20) (97% - 100%)  no jvd/bruit  s1 s2 rrr  cta  s/nt/nd  no calf tend                        11.7   10.17 )-----------( 227      ( 01 Oct 2021 06:30 )             36.0   10-01    141  |  106  |  13  ----------------------------<  133<H>  4.6   |  30  |  0.73    Ca    8.6      01 Oct 2021 06:30        cont dvt px  pain control  bowel regimen  antiemetics  incentive spirometer
Post-op Check   POD#0 S/P Right TKA   60yFemale Patient seen and examined, Pain controlled  Patient Denies SOB, CP, N/V/D       PE: Right Knee/LE: Dressing C/D/I, Sensation/motor intact, DP 2+, FROM ankle/toes   B/L LE: Skin intact. +ROM hip/knee/ankle/toes. Ankle Dorsi/plantarflexion: 5/5. Calf: soft, compressible and nontender. DP/PT 2+ NVI                           11.4   8.68  )-----------( 202      ( 30 Sep 2021 16:00 )             35.9       09-30    142  |  109<H>  |  13  ----------------------------<  124<H>  4.0   |  29  |  0.66    Ca    8.8      30 Sep 2021 16:00          A: As above   P: Pain Control       DVT Prophylaxis      Incentive spirometry      PT WBAT RLE      Isometric exercises      Discharge Planning      All the above discussed and understood by pt       Ortho to F/U

## 2021-10-04 DIAGNOSIS — M17.11 UNILATERAL PRIMARY OSTEOARTHRITIS, RIGHT KNEE: ICD-10-CM

## 2021-10-04 DIAGNOSIS — E66.9 OBESITY, UNSPECIFIED: ICD-10-CM

## 2021-10-04 DIAGNOSIS — I10 ESSENTIAL (PRIMARY) HYPERTENSION: ICD-10-CM

## 2021-10-04 DIAGNOSIS — E11.9 TYPE 2 DIABETES MELLITUS WITHOUT COMPLICATIONS: ICD-10-CM

## 2021-10-05 LAB — SURGICAL PATHOLOGY STUDY: SIGNIFICANT CHANGE UP

## 2021-12-20 ENCOUNTER — APPOINTMENT (OUTPATIENT)
Dept: SURGERY | Facility: CLINIC | Age: 61
End: 2021-12-20
Payer: COMMERCIAL

## 2021-12-20 VITALS — BODY MASS INDEX: 35.03 KG/M2 | WEIGHT: 218 LBS | HEIGHT: 66 IN

## 2021-12-20 DIAGNOSIS — Z98.84 BARIATRIC SURGERY STATUS: ICD-10-CM

## 2021-12-20 PROCEDURE — 99212 OFFICE O/P EST SF 10 MIN: CPT | Mod: 95

## 2021-12-20 NOTE — HISTORY OF PRESENT ILLNESS
[de-identified] : Pretty is a 61 y/o female here for follow up visit. Patient is s/p laparoscopic sleeve gastrectomy on 12/15/20. \par She presents for her annual follow-up visit.  She continues to do very well, has mild reflux that is well controlled.  She otherwise reports no abdominal pain, no dysphagia, no vomiting.  She is maintaining healthy diet and takes her multivitamins.  She recently underwent knee replacement surgery in September and has recovered well from that.  She is awaiting scheduling of her contralateral knee replacement.\par

## 2021-12-20 NOTE — PLAN
[FreeTextEntry1] : [] cont bariatric diet and consult with nutritionist as needed\par [] goal 30 min cardiovascular exercise daily, with some weight resistance training as tolerated\par [] continue multivitamins\par [] f/u 1 year

## 2021-12-20 NOTE — REASON FOR VISIT
[Home] : at home, [unfilled] , at the time of the visit. [Medical Office: ([facility])___] : at the medical office located in  [Follow-Up Visit] : a follow-up visit for [Verbal consent obtained from patient] : the patient, [unfilled]

## 2021-12-20 NOTE — ASSESSMENT
[FreeTextEntry1] : 60-year-old female recovering well status post laparoscopic sleeve gastrectomy 12/15/2020.\par She is down to 218 pounds with a BMI of 35.\par Mild reflux.

## 2022-05-18 ENCOUNTER — OUTPATIENT (OUTPATIENT)
Dept: OUTPATIENT SERVICES | Facility: HOSPITAL | Age: 62
LOS: 1 days | Discharge: HOME | End: 2022-05-18
Payer: COMMERCIAL

## 2022-05-18 VITALS
TEMPERATURE: 98 F | SYSTOLIC BLOOD PRESSURE: 147 MMHG | HEART RATE: 65 BPM | RESPIRATION RATE: 18 BRPM | WEIGHT: 219.36 LBS | HEIGHT: 66 IN | DIASTOLIC BLOOD PRESSURE: 67 MMHG | OXYGEN SATURATION: 99 %

## 2022-05-18 DIAGNOSIS — Z98.890 OTHER SPECIFIED POSTPROCEDURAL STATES: Chronic | ICD-10-CM

## 2022-05-18 DIAGNOSIS — Z01.818 ENCOUNTER FOR OTHER PREPROCEDURAL EXAMINATION: ICD-10-CM

## 2022-05-18 DIAGNOSIS — M17.12 UNILATERAL PRIMARY OSTEOARTHRITIS, LEFT KNEE: ICD-10-CM

## 2022-05-18 DIAGNOSIS — I10 ESSENTIAL (PRIMARY) HYPERTENSION: ICD-10-CM

## 2022-05-18 DIAGNOSIS — Z96.651 PRESENCE OF RIGHT ARTIFICIAL KNEE JOINT: Chronic | ICD-10-CM

## 2022-05-18 DIAGNOSIS — Z87.19 PERSONAL HISTORY OF OTHER DISEASES OF THE DIGESTIVE SYSTEM: Chronic | ICD-10-CM

## 2022-05-18 DIAGNOSIS — Z98.1 ARTHRODESIS STATUS: Chronic | ICD-10-CM

## 2022-05-18 DIAGNOSIS — Z98.84 BARIATRIC SURGERY STATUS: Chronic | ICD-10-CM

## 2022-05-18 DIAGNOSIS — E11.9 TYPE 2 DIABETES MELLITUS WITHOUT COMPLICATIONS: ICD-10-CM

## 2022-05-18 LAB
ALBUMIN SERPL ELPH-MCNC: 4 G/DL — SIGNIFICANT CHANGE UP (ref 3.3–5)
ALP SERPL-CCNC: 110 U/L — SIGNIFICANT CHANGE UP (ref 40–120)
ALT FLD-CCNC: 24 U/L — SIGNIFICANT CHANGE UP (ref 12–78)
ANION GAP SERPL CALC-SCNC: 6 MMOL/L — SIGNIFICANT CHANGE UP (ref 5–17)
APTT BLD: 30.2 SEC — SIGNIFICANT CHANGE UP (ref 27.5–35.5)
AST SERPL-CCNC: 21 U/L — SIGNIFICANT CHANGE UP (ref 15–37)
BASOPHILS # BLD AUTO: 0.04 K/UL — SIGNIFICANT CHANGE UP (ref 0–0.2)
BASOPHILS NFR BLD AUTO: 0.7 % — SIGNIFICANT CHANGE UP (ref 0–2)
BILIRUB SERPL-MCNC: 0.4 MG/DL — SIGNIFICANT CHANGE UP (ref 0.2–1.2)
BLD GP AB SCN SERPL QL: SIGNIFICANT CHANGE UP
BUN SERPL-MCNC: 18 MG/DL — SIGNIFICANT CHANGE UP (ref 7–23)
CALCIUM SERPL-MCNC: 10.3 MG/DL — HIGH (ref 8.5–10.1)
CHLORIDE SERPL-SCNC: 107 MMOL/L — SIGNIFICANT CHANGE UP (ref 96–108)
CO2 SERPL-SCNC: 30 MMOL/L — SIGNIFICANT CHANGE UP (ref 22–31)
CREAT SERPL-MCNC: 0.86 MG/DL — SIGNIFICANT CHANGE UP (ref 0.5–1.3)
EGFR: 78 ML/MIN/1.73M2 — SIGNIFICANT CHANGE UP
EOSINOPHIL # BLD AUTO: 0.2 K/UL — SIGNIFICANT CHANGE UP (ref 0–0.5)
EOSINOPHIL NFR BLD AUTO: 3.3 % — SIGNIFICANT CHANGE UP (ref 0–6)
GLUCOSE SERPL-MCNC: 86 MG/DL — SIGNIFICANT CHANGE UP (ref 70–99)
HCT VFR BLD CALC: 40.6 % — SIGNIFICANT CHANGE UP (ref 34.5–45)
HGB BLD-MCNC: 12.9 G/DL — SIGNIFICANT CHANGE UP (ref 11.5–15.5)
IMM GRANULOCYTES NFR BLD AUTO: 0.2 % — SIGNIFICANT CHANGE UP (ref 0–1.5)
INR BLD: 0.98 RATIO — SIGNIFICANT CHANGE UP (ref 0.88–1.16)
LYMPHOCYTES # BLD AUTO: 2.08 K/UL — SIGNIFICANT CHANGE UP (ref 1–3.3)
LYMPHOCYTES # BLD AUTO: 34.2 % — SIGNIFICANT CHANGE UP (ref 13–44)
MCHC RBC-ENTMCNC: 26.8 PG — LOW (ref 27–34)
MCHC RBC-ENTMCNC: 31.8 G/DL — LOW (ref 32–36)
MCV RBC AUTO: 84.4 FL — SIGNIFICANT CHANGE UP (ref 80–100)
MONOCYTES # BLD AUTO: 0.46 K/UL — SIGNIFICANT CHANGE UP (ref 0–0.9)
MONOCYTES NFR BLD AUTO: 7.6 % — SIGNIFICANT CHANGE UP (ref 2–14)
MRSA PCR RESULT.: SIGNIFICANT CHANGE UP
NEUTROPHILS # BLD AUTO: 3.3 K/UL — SIGNIFICANT CHANGE UP (ref 1.8–7.4)
NEUTROPHILS NFR BLD AUTO: 54 % — SIGNIFICANT CHANGE UP (ref 43–77)
NRBC # BLD: 0 /100 WBCS — SIGNIFICANT CHANGE UP (ref 0–0)
PLATELET # BLD AUTO: 216 K/UL — SIGNIFICANT CHANGE UP (ref 150–400)
POTASSIUM SERPL-MCNC: 4.4 MMOL/L — SIGNIFICANT CHANGE UP (ref 3.5–5.3)
POTASSIUM SERPL-SCNC: 4.4 MMOL/L — SIGNIFICANT CHANGE UP (ref 3.5–5.3)
PROT SERPL-MCNC: 7.4 GM/DL — SIGNIFICANT CHANGE UP (ref 6–8.3)
PROTHROM AB SERPL-ACNC: 11.7 SEC — SIGNIFICANT CHANGE UP (ref 10.5–13.4)
RBC # BLD: 4.81 M/UL — SIGNIFICANT CHANGE UP (ref 3.8–5.2)
RBC # FLD: 13 % — SIGNIFICANT CHANGE UP (ref 10.3–14.5)
S AUREUS DNA NOSE QL NAA+PROBE: SIGNIFICANT CHANGE UP
SODIUM SERPL-SCNC: 143 MMOL/L — SIGNIFICANT CHANGE UP (ref 135–145)
WBC # BLD: 6.09 K/UL — SIGNIFICANT CHANGE UP (ref 3.8–10.5)
WBC # FLD AUTO: 6.09 K/UL — SIGNIFICANT CHANGE UP (ref 3.8–10.5)

## 2022-05-18 PROCEDURE — 93010 ELECTROCARDIOGRAM REPORT: CPT

## 2022-05-18 RX ORDER — REPAGLINIDE 1 MG/1
1 TABLET ORAL
Qty: 0 | Refills: 0 | DISCHARGE

## 2022-05-18 RX ORDER — GABAPENTIN 400 MG/1
1 CAPSULE ORAL
Qty: 0 | Refills: 0 | DISCHARGE

## 2022-05-18 NOTE — PHYSICAL THERAPY INITIAL EVALUATION ADULT - SINGLE LEG BALANCE TEST, REHAB EVAL
One Leg Stand Test (OLST): Right: 1 second.  Left: 1 second.  Functional test to assess fall risk. Both gait and stair negotiation require components of OLST. Participants unable to perform the OLST for at least 5 seconds are at increased risk for injurious fall.

## 2022-05-18 NOTE — PHYSICAL THERAPY INITIAL EVALUATION ADULT - 30 SECOND CHAIR STAND TEST, REHAB EVAL
30 second Sit to Stand Test: (reps: 7  adaptation: none) Functional assessment of lower extremity strength and ability to maintain balance in standing, discriminates those with low and high levels of functional activity.

## 2022-05-18 NOTE — H&P PST ADULT - PROBLEM SELECTOR PLAN 2
labs - cbc,pt/ptt,bmp,t&s,nose cx,ekg  M/C required  preop 3 day Hibiclens instruction reviewed and given .instructed on if  nose cx positive use Mupirocin 5 days and checklist given  take routine meds DOS with sips of water. avoid NSAID and OTC supplements. verbalized understanding  information on proper nutrition , increase protein and better food choices provided in packet   anesthesiologist to review PST labs, EKG, medical clearance and optimization for surgery

## 2022-05-18 NOTE — PHYSICAL THERAPY INITIAL EVALUATION ADULT - ADDITIONAL COMMENTS
Pt reporting that she lives in a private house with 4-5 steps to enter with B handrails that are far apart. Once inside there are 13 steps with 1 handrail to R. Bathroom is a tub shower, no grab bars, retractable shower head, standard height toilet. Pt has a cane, rolling walker and commode. Pt uses a cane when out. Pain is 7-8/10 at rest and can increase to 10/10 activity. Pt takes motrin and vicodin prn for pain, reports no adverse reactions to pain medications, no recent PT, no falls, but has experienced buckling. Pt wears glasses, is R handed, drives, no hearing impairments.

## 2022-05-18 NOTE — H&P PST ADULT - ASSESSMENT
61 year old female with a past medical history of HTN, GERD, and  DM (diet controlled ) c/o pain swelling, and limited ROM to left knee secondary to osteoarthritis  She is scheduled for a robotic assisted left total knee arthroplasty with ERYN.    CAPRINI SCORE [CLOT]    AGE RELATED RISK FACTORS                                                       MOBILITY RELATED FACTORS  [x ] Age 41-60 years                                            (1 Point)                  [ ] Bed rest                                                        (1 Point)  [ ] Age: 61-74 years                                           (2 Points)                 [ ] Plaster cast                                                   (2 Points)  [ ] Age= 75 years                                              (3 Points)                 [ ] Bed bound for more than 72 hours                 (2 Points)    DISEASE RELATED RISK FACTORS                                               GENDER SPECIFIC FACTORS  [x ] Edema in the lower extremities                       (1 Point)                  [ ] Pregnancy                                                     (1 Point)  [ ] Varicose veins                                               (1 Point)                  [ ] Post-partum < 6 weeks                                   (1 Point)             [x ] BMI > 25 Kg/m2                                            (1 Point)                  [ ] Hormonal therapy  or oral contraception          (1 Point)                 [ ] Sepsis (in the previous month)                        (1 Point)                  [ ] History of pregnancy complications                 (1 point)  [ ] Pneumonia or serious lung disease                                               [ ] Unexplained or recurrent                     (1 Point)           (in the previous month)                               (1 Point)  [ ] Abnormal pulmonary function test                     (1 Point)                 SURGERY RELATED RISK FACTORS  [ ] Acute myocardial infarction                              (1 Point)                 [ ]  Section                                             (1 Point)  [ ] Congestive heart failure (in the previous month)  (1 Point)               [ ] Minor surgery                                                  (1 Point)   [ ] Inflammatory bowel disease                             (1 Point)                 [ ] Arthroscopic surgery                                        (2 Points)  [ ] Central venous access                                      (2 Points)                [ ] General surgery lasting more than 45 minutes   (2 Points)       [ ] Stroke (in the previous month)                          (5 Points)               x[ ] Elective arthroplasty                                         (5 Points)                                                                                                                                               HEMATOLOGY RELATED FACTORS                                                 TRAUMA RELATED RISK FACTORS  [ ] Prior episodes of VTE                                     (3 Points)                [ ] Fracture of the hip, pelvis, or leg                       (5 Points)  [ ] Positive family history for VTE                         (3 Points)                 [ ] Acute spinal cord injury (in the previous month)  (5 Points)  [ ] Prothrombin 13867 A                                     (3 Points)                 [ ] Paralysis  (less than 1 month)                             (5 Points)  [ ] Factor V Leiden                                             (3 Points)                  [ ] Multiple Trauma within 1 month                        (5 Points)  [ ] Lupus anticoagulants                                     (3 Points)                                                           [ ] Anticardiolipin antibodies                               (3 Points)                                                       [ ] High homocysteine in the blood                      (3 Points)                                             [ ] Other congenital or acquired thrombophilia      (3 Points)                                                [ ] Heparin induced thrombocytopenia                  (3 Points)                                          Total Score [ 8         ]      Caprini score indicates that the patient is high risk for VTE event ( score 6 or greater). Surgical patient's in this group will benefit from both pharmacologic prophylaxis and intermittent compression devices . Surgical team will determine the balance between VTE  risk and bleeding risk and other clinical considerations     Caprini Score 0 - 2:  Low Risk, No VTE Prophylaxis required for most patients, encourage ambulation  Caprini Score 3 - 6:  At Risk, pharmacologic VTE prophylaxis is indicated for most patients (in the absence of a contraindication)  Caprini Score Greater than or = 7:  High Risk, pharmacologic VTE prophylaxis is indicated for most patients (in the absence of a contraindication)

## 2022-05-18 NOTE — H&P PST ADULT - NSICDXPASTSURGICALHX_GEN_ALL_CORE_FT
PAST SURGICAL HISTORY:  H/O bariatric surgery gastic sleeve 12/2020    H/O colonoscopy 11/25/20    H/O endoscopy 8/13/20    H/O total knee replacement, right 9/30/2021    History of rectal abscess drainage 2012    S/P arthroscopy of knee right 2016    S/P lumbar spinal fusion 2018 +hardware

## 2022-05-18 NOTE — H&P PST ADULT - HISTORY OF PRESENT ILLNESS
61 year old female with a past medical history of HTN, GERD, and  DM (diet controlled ) c/o pain swelling, and limited ROM to left knee secondary to osteoarthritis  She is scheduled for a robotic assisted left total knee arthroplasty with ERYN.    She denies fever, cough, SOB, and sick contacts.    Goal: to walk without pain

## 2022-05-19 LAB
A1C WITH ESTIMATED AVERAGE GLUCOSE RESULT: 5.9 % — HIGH (ref 4–5.6)
ESTIMATED AVERAGE GLUCOSE: 123 MG/DL — HIGH (ref 68–114)
VIT D25+D1,25 OH+D1,25 PNL SERPL-MCNC: 39.4 PG/ML — SIGNIFICANT CHANGE UP (ref 19.9–79.3)

## 2022-05-24 ENCOUNTER — APPOINTMENT (OUTPATIENT)
Dept: ORTHOPEDIC SURGERY | Facility: CLINIC | Age: 62
End: 2022-05-24
Payer: COMMERCIAL

## 2022-05-24 ENCOUNTER — FORM ENCOUNTER (OUTPATIENT)
Age: 62
End: 2022-05-24

## 2022-05-24 VITALS — BODY MASS INDEX: 35.36 KG/M2 | WEIGHT: 220 LBS | HEIGHT: 66 IN

## 2022-05-24 DIAGNOSIS — M17.0 BILATERAL PRIMARY OSTEOARTHRITIS OF KNEE: ICD-10-CM

## 2022-05-24 PROCEDURE — 99213 OFFICE O/P EST LOW 20 MIN: CPT

## 2022-05-24 NOTE — ASSESSMENT
[FreeTextEntry1] : Severe OA both knees. Discussed options - failed injections, NSAIDs and PT in the past and has pain with ADLs - ROM right knee only to about 80, left to about 100. Will plan for right TKA. Last A1c 6.2, did well with weight loss after gastric sleeve. No h/O DVT. Concern for poor post op response due to ongoing poor ROM and mechanics. Advised patient that post op, would have greater difficulty in recovery & PT. Discussed procedure, recovery process, risks and benefits of TKA. 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 Discussed pain mgmt expectations.\par 9/27/21 - she is planning TKA SAILAJA this week rt knee - left knee is severe and would like injection today \par 10/12/21: 2 wks s/p Right TKA. Doing well. PT reports trouble sleeping/heaviness feeling in the knee. this is normal Xrays well fixed. Discussed Post operative protocol. No bathing submerging 2-3 wks. Discussed importance of continued elevation. Renewed oxycodone. Continue HEP and PT\par 11/8 rt knee doing well happy ROM is 100 started at 80 -- Left knee is worse had recent Inj - we will try duexis that helped her in the past and if cont pain may repeat zilretta - she may need TKA on the left at some pain we discussed this today\par 12/13/21: Right TKA doing very well, cont HEP. Left knee worsening - inj today tolerated well, needs auth for zilretta and will return for this as soon as allowed. Considering TKA in the spring.\par 2/14/22: Left knee zilretta inj today tolerated well - plan for left TKA with sailaja after 5/14/22. Need CT to eval for bone loss.\par \par 5/24/22: left knee pain getting worse. Scheduled for L TKA in June. Risks and benefits discussed and all questions answered. recently had R TKA in September with no issues

## 2022-05-24 NOTE — IMAGING
[de-identified] : Right Knee: Inspection of the knee is as follows: mild effusion. Knee Range of Motion is as follows: Extension: 0 degrees. Flexion: 100 degrees. Gait and function is as follows: ambulation with cane. \par \par Left Knee: Inspection of the knee is as follows: mild effusion. Palpation of the knee is as follows: medial joint line tenderness. Knee Range of Motion is as follows: Extension: 0 degrees. Flexion: 100 degrees. Gait and function is as follows: patient ambulates without assistive device and antalgic gait.

## 2022-05-24 NOTE — HISTORY OF PRESENT ILLNESS
[8] : 8 [7] : 7 [Dull/Aching] : dull/aching [Meds] : meds [] : yes [de-identified] : 5/24/22: continued and worsening pain in b/l knees- Scheduled for TKA 6/8/22. Ziretta helped for one month\par \par PRev Doc:\par 8/17/21: NC from Dr. Novoa. Right > left knee pain for several years, worsening. Injections in the past with mild improvement. Had zilretta both knees 1 month ago which helped a lot. Back fusion 2018 and feels knees are worse since that. Had right groin pain a few months ago which improved after hip inj. Pain with stairs. Gastric sleeve a few months ago - lost 42 lbs. Recent A1c 6.2.\par 9/27/21 she is ready for TKA this week but left knee is severe and zillretta worse\par 10/12/21: 2 wks s/p right knee TKA. Doing better than before sx. Struggling with pain at night. Denies fever/chills. PT reports pain meds use, but is trying to decreased.\par 12/13/21: Right knee min pain, stopped PT. left knee worsening pain.\par 2/14/22: Worsening left knee pain.

## 2022-05-24 NOTE — DISCUSSION/SUMMARY
[de-identified] : The natural progression of Osteoarthritis was explained to the patient.  We discussed the possible treatment options from conservative to operative.  These included NSAIDS, Glucosamine and Chondrotin sulfate, and Physical Therapy as well different types of injections.  We also discussed that at some point they may progress to needed a TKA.  Information and pamphlets were given when appropriate.\par \par Patient Complains of pain in Knee with a level that often reaches greater than a 8/10. The Pain has been\par progressively worsening of his/her treatment coarse. The pain has interfered with their ADLs and worsens with\par weight bearing. On exam they often have episodes of swelling/effusion with limited ROM. Pain worsens with ROM\par passive and active and I can palpate crepitus.\par  X rays were reviewed with the patient and they show joint space narrowing, subchondral sclerosis,\par osteophyte formation, and subchondral cysts.\par  After a period of more than 12 weeks physical therapy or exercise program done with me or another\par treating physician they have continued pain. The patient has failed a trial of NSAID medication or pain relieves if\par they were unable to tolerate NSAID medications as well as a series of injection, steroid or Hyaluronic Acid. After a\par long discussion with the patient both the patient and I have decided we have exhausted all forms of less radical\par treatments and they would like to proceed with Total Knee Replacement\par \par We discussed my findings and the natural history of their condition.  We talked about the details of the proposed surgery and the recovery.  We discussed the material risks, possible benefits and alternatives to surgery.  The risks include but are not limited to infection, bleeding and possible need for blood transfusion, fracture, bowel blockage, bladder retention or infection, need for reoperation, stiffness and/or limited range of motion, possible damage to nerves and blood vessels, failure of fixation of components, risk of deep vein thromboses and pulmonary embolism, wound healing problems, dislocation, and possible leg length discrepancy.  Although incredibly rare, we also discussed the risks of a cardiac event, stroke and even death during, or following, the surgery.  We discussed the type of implants the patient will be receiving and the type of fixation that will be used, as well as whether a robot or computer navigation aide will be used.  The patient understands they will need medical clearance and will attend a preoperative joint education class.  We also discussed the type of anesthesia they will receive, and the risks associated with hospital or rehab length of stay, obesity, diabetes and smoking.

## 2022-05-25 ENCOUNTER — TRANSCRIPTION ENCOUNTER (OUTPATIENT)
Age: 62
End: 2022-05-25

## 2022-05-25 ENCOUNTER — APPOINTMENT (OUTPATIENT)
Dept: CT IMAGING | Facility: CLINIC | Age: 62
End: 2022-05-25

## 2022-05-25 PROCEDURE — 73700 CT LOWER EXTREMITY W/O DYE: CPT | Mod: LT

## 2022-05-25 PROCEDURE — 76376 3D RENDER W/INTRP POSTPROCES: CPT | Mod: LT

## 2022-06-04 ENCOUNTER — NON-APPOINTMENT (OUTPATIENT)
Age: 62
End: 2022-06-04

## 2022-06-07 ENCOUNTER — TRANSCRIPTION ENCOUNTER (OUTPATIENT)
Age: 62
End: 2022-06-07

## 2022-06-08 ENCOUNTER — OUTPATIENT (OUTPATIENT)
Dept: OUTPATIENT SERVICES | Facility: HOSPITAL | Age: 62
LOS: 1 days | Discharge: HOME | End: 2022-06-08

## 2022-06-08 ENCOUNTER — APPOINTMENT (OUTPATIENT)
Dept: ORTHOPEDIC SURGERY | Facility: HOSPITAL | Age: 62
End: 2022-06-08

## 2022-06-08 ENCOUNTER — TRANSCRIPTION ENCOUNTER (OUTPATIENT)
Age: 62
End: 2022-06-08

## 2022-06-08 DIAGNOSIS — Z98.1 ARTHRODESIS STATUS: Chronic | ICD-10-CM

## 2022-06-08 DIAGNOSIS — Z98.890 OTHER SPECIFIED POSTPROCEDURAL STATES: Chronic | ICD-10-CM

## 2022-06-08 DIAGNOSIS — Z87.19 PERSONAL HISTORY OF OTHER DISEASES OF THE DIGESTIVE SYSTEM: Chronic | ICD-10-CM

## 2022-06-08 DIAGNOSIS — Z96.651 PRESENCE OF RIGHT ARTIFICIAL KNEE JOINT: Chronic | ICD-10-CM

## 2022-06-08 DIAGNOSIS — Z98.84 BARIATRIC SURGERY STATUS: Chronic | ICD-10-CM

## 2022-06-08 RX ORDER — CHOLECALCIFEROL (VITAMIN D3) 125 MCG
1 CAPSULE ORAL
Qty: 0 | Refills: 0 | DISCHARGE

## 2022-06-08 RX ORDER — METOPROLOL TARTRATE 50 MG
1 TABLET ORAL
Qty: 0 | Refills: 0 | DISCHARGE

## 2022-06-08 RX ORDER — GABAPENTIN 400 MG/1
1 CAPSULE ORAL
Qty: 0 | Refills: 0 | DISCHARGE

## 2022-06-08 RX ORDER — ZINC SULFATE TAB 220 MG (50 MG ZINC EQUIVALENT) 220 (50 ZN) MG
1 TAB ORAL
Qty: 0 | Refills: 0 | DISCHARGE

## 2022-06-08 RX ORDER — ASCORBIC ACID 60 MG
1 TABLET,CHEWABLE ORAL
Qty: 0 | Refills: 0 | DISCHARGE

## 2022-06-15 PROBLEM — G47.33 OBSTRUCTIVE SLEEP APNEA (ADULT) (PEDIATRIC): Chronic | Status: ACTIVE | Noted: 2020-12-09

## 2022-06-19 DIAGNOSIS — Z79.899 OTHER LONG TERM (CURRENT) DRUG THERAPY: ICD-10-CM

## 2022-06-19 DIAGNOSIS — Z98.1 ARTHRODESIS STATUS: ICD-10-CM

## 2022-06-19 DIAGNOSIS — M17.12 UNILATERAL PRIMARY OSTEOARTHRITIS, LEFT KNEE: ICD-10-CM

## 2022-06-19 DIAGNOSIS — Z79.891 LONG TERM (CURRENT) USE OF OPIATE ANALGESIC: ICD-10-CM

## 2022-06-19 DIAGNOSIS — E11.9 TYPE 2 DIABETES MELLITUS WITHOUT COMPLICATIONS: ICD-10-CM

## 2022-06-19 DIAGNOSIS — K21.9 GASTRO-ESOPHAGEAL REFLUX DISEASE WITHOUT ESOPHAGITIS: ICD-10-CM

## 2022-06-19 DIAGNOSIS — G47.33 OBSTRUCTIVE SLEEP APNEA (ADULT) (PEDIATRIC): ICD-10-CM

## 2022-06-19 DIAGNOSIS — I10 ESSENTIAL (PRIMARY) HYPERTENSION: ICD-10-CM

## 2022-06-19 DIAGNOSIS — Z98.84 BARIATRIC SURGERY STATUS: ICD-10-CM

## 2022-06-21 ENCOUNTER — APPOINTMENT (OUTPATIENT)
Dept: ORTHOPEDIC SURGERY | Facility: CLINIC | Age: 62
End: 2022-06-21
Payer: COMMERCIAL

## 2022-06-21 PROCEDURE — 73562 X-RAY EXAM OF KNEE 3: CPT | Mod: LT

## 2022-06-21 PROCEDURE — 99024 POSTOP FOLLOW-UP VISIT: CPT

## 2022-06-21 RX ORDER — HYDROMORPHONE HYDROCHLORIDE 2 MG/1
2 TABLET ORAL EVERY 6 HOURS
Qty: 40 | Refills: 0 | Status: ACTIVE | COMMUNITY
Start: 2022-06-21 | End: 1900-01-01

## 2022-06-21 NOTE — ASSESSMENT
[FreeTextEntry1] : Severe OA both knees. Discussed options - failed injections, NSAIDs and PT in the past and has pain with ADLs - ROM right knee only to about 80, left to about 100. Will plan for right TKA. Last A1c 6.2, did well with weight loss after gastric sleeve. No h/O DVT. Concern for poor post op response due to ongoing poor ROM and mechanics. Advised patient that post op, would have greater difficulty in recovery & PT. Discussed procedure, recovery process, risks and benefits of TKA. 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 Discussed pain mgmt expectations.\par 9/27/21 - she is planning TKA SAILAJA this week rt knee - left knee is severe and would like injection today \par 10/12/21: 2 wks s/p Right TKA. Doing well. PT reports trouble sleeping/heaviness feeling in the knee. this is normal Xrays well fixed. Discussed Post operative protocol. No bathing submerging 2-3 wks. Discussed importance of continued elevation. Renewed oxycodone. Continue HEP and PT\par 11/8 rt knee doing well happy ROM is 100 started at 80 -- Left knee is worse had recent Inj - we will try duexis that helped her in the past and if cont pain may repeat zilretta - she may need TKA on the left at some pain we discussed this today\par 12/13/21: Right TKA doing very well, cont HEP. Left knee worsening - inj today tolerated well, needs auth for zilretta and will return for this as soon as allowed. Considering TKA in the spring.\par 2/14/22: Left knee zilretta inj today tolerated well - plan for left TKA with sailaja after 5/14/22. Need CT to eval for bone loss.\par 5/24/22: left knee pain getting worse. Scheduled for L TKA in June. Risks and benefits discussed and all questions answered. recently had R TKA in September with no issues\par \par 6/21/22: 2 weeks postop L TKA still having a lot of pain. Switch pain meds to dilaudid, start PT and fu in 4 weeks

## 2022-06-21 NOTE — HISTORY OF PRESENT ILLNESS
[Constant] : constant [Sleep] : sleep [Meds] : meds [Lying in bed] : lying in bed [] : Post Surgical Visit: yes [de-identified] : 6/21/22 doing ok but feels this was more painful - no fever no chills\par \par PRev Doc:\par 8/17/21: NC from Dr. Novoa. Right > left knee pain for several years, worsening. Injections in the past with mild improvement. Had zilretta both knees 1 month ago which helped a lot. Back fusion 2018 and feels knees are worse since that. Had right groin pain a few months ago which improved after hip inj. Pain with stairs. Gastric sleeve a few months ago - lost 42 lbs. Recent A1c 6.2.\par 9/27/21 she is ready for TKA this week but left knee is severe and zillretta worse\par 10/12/21: 2 wks s/p right knee TKA. Doing better than before sx. Struggling with pain at night. Denies fever/chills. PT reports pain meds use, but is trying to decreased.\par 12/13/21: Right knee min pain, stopped PT. left knee worsening pain.\par 2/14/22: Worsening left knee pain.\par 5/24/22: continued and worsening pain in b/l knees- Scheduled for TKA 6/8/22. Ziretta helped for one month [FreeTextEntry1] : left knee [FreeTextEntry5] : pt reports of pain in swelling in knee. in office with cane today. [FreeTextEntry7] : knee to left buttocks  [de-identified] : 06/08/22 [de-identified] : physical therapy  [de-identified] : 06/08/22 [de-identified] : L TKR

## 2022-06-21 NOTE — IMAGING
[de-identified] : inc c/d/i\par NVI\par Strenth 5/5\par Pulses +2 DP/PT\par Decreased ROM - 5-95\par \par left knee xray\par componets fixed good positon

## 2022-06-21 NOTE — DISCUSSION/SUMMARY
[de-identified] : We discussed the patient's progress and they were reminded of their antibiotic prophylaxis.  We discussed continued physical therapy and/or a home exercise program.  Questions about their knee replacement and future follow up were answered and discussed.The incision was inspected and was clean and dry with no drainage.  The patient was instructed to call for fevers, chills, wound drainage, wound opening, redness, or any other concerns.\par

## 2022-07-11 ENCOUNTER — NON-APPOINTMENT (OUTPATIENT)
Age: 62
End: 2022-07-11

## 2022-07-11 ENCOUNTER — APPOINTMENT (OUTPATIENT)
Dept: CARDIOLOGY | Facility: CLINIC | Age: 62
End: 2022-07-11

## 2022-07-11 VITALS
OXYGEN SATURATION: 99 % | HEART RATE: 75 BPM | DIASTOLIC BLOOD PRESSURE: 70 MMHG | SYSTOLIC BLOOD PRESSURE: 130 MMHG | WEIGHT: 225 LBS | HEIGHT: 66 IN | TEMPERATURE: 98.2 F | BODY MASS INDEX: 36.16 KG/M2

## 2022-07-11 DIAGNOSIS — R60.0 LOCALIZED EDEMA: ICD-10-CM

## 2022-07-11 PROCEDURE — 93000 ELECTROCARDIOGRAM COMPLETE: CPT

## 2022-07-11 PROCEDURE — 93306 TTE W/DOPPLER COMPLETE: CPT

## 2022-07-11 PROCEDURE — 99214 OFFICE O/P EST MOD 30 MIN: CPT | Mod: 25

## 2022-07-11 PROCEDURE — 93970 EXTREMITY STUDY: CPT

## 2022-07-11 NOTE — PHYSICAL EXAM
[Well Developed] : well developed [Well Nourished] : well nourished [No Acute Distress] : no acute distress [Normal Conjunctiva] : normal conjunctiva [Normal Venous Pressure] : normal venous pressure [Normal S1, S2] : normal S1, S2 [No Carotid Bruit] : no carotid bruit [No Murmur] : no murmur [No Rub] : no rub [No Gallop] : no gallop [Clear Lung Fields] : clear lung fields [Good Air Entry] : good air entry [No Respiratory Distress] : no respiratory distress  [Soft] : abdomen soft [Non Tender] : non-tender [No Masses/organomegaly] : no masses/organomegaly [Normal Bowel Sounds] : normal bowel sounds [Normal Gait] : normal gait [No Cyanosis] : no cyanosis [No Clubbing] : no clubbing [No Varicosities] : no varicosities [No Rash] : no rash [No Skin Lesions] : no skin lesions [Moves all extremities] : moves all extremities [No Focal Deficits] : no focal deficits [Normal Speech] : normal speech [Alert and Oriented] : alert and oriented [Normal memory] : normal memory [de-identified] : edema bilaterally site of knee replacement noted bilaterally

## 2022-07-11 NOTE — HISTORY OF PRESENT ILLNESS
[FreeTextEntry1] : This is a 61 year female with a Pmhx of HTN obesity s/p bastric surgery s/p b/l TKR most recent june 8th. who presents to the office for cardiac follow up. pt reports she went to her ortho MD on 5/18 and was tod  her EKG looked abnormal and was advised to f/u with her cardiologist\par pt reports feelinh well Denies any complaints \par \par Pt denies any CP, SOB, heart palpitations, dizziness, abdominal pain N/V/D fever or chills\par

## 2022-07-12 ENCOUNTER — APPOINTMENT (OUTPATIENT)
Dept: CARDIOLOGY | Facility: CLINIC | Age: 62
End: 2022-07-12

## 2022-07-12 PROCEDURE — 93015 CV STRESS TEST SUPVJ I&R: CPT

## 2022-07-12 PROCEDURE — A9500: CPT

## 2022-07-12 PROCEDURE — 78452 HT MUSCLE IMAGE SPECT MULT: CPT

## 2022-07-12 RX ORDER — REGADENOSON 0.08 MG/ML
0.4 INJECTION, SOLUTION INTRAVENOUS
Qty: 1 | Refills: 0 | Status: COMPLETED | OUTPATIENT
Start: 2022-07-12

## 2022-07-12 RX ADMIN — REGADENOSON 5 MG/5ML: 0.08 INJECTION, SOLUTION INTRAVENOUS at 00:00

## 2022-07-13 ENCOUNTER — NON-APPOINTMENT (OUTPATIENT)
Age: 62
End: 2022-07-13

## 2022-07-19 DIAGNOSIS — R89.9 UNSPECIFIED ABNORMAL FINDING IN SPECIMENS FROM OTHER ORGANS, SYSTEMS AND TISSUES: ICD-10-CM

## 2022-07-19 LAB
ALBUMIN SERPL ELPH-MCNC: 4.2 G/DL
ALP BLD-CCNC: 103 U/L
ALT SERPL-CCNC: 15 U/L
ANION GAP SERPL CALC-SCNC: 10 MMOL/L
APPEARANCE: CLEAR
AST SERPL-CCNC: 20 U/L
BACTERIA: NEGATIVE
BASOPHILS # BLD AUTO: 0.05 K/UL
BASOPHILS NFR BLD AUTO: 0.9 %
BILIRUB DIRECT SERPL-MCNC: 0.1 MG/DL
BILIRUB INDIRECT SERPL-MCNC: 0.2 MG/DL
BILIRUB SERPL-MCNC: 0.3 MG/DL
BILIRUBIN URINE: NEGATIVE
BLOOD URINE: NEGATIVE
BUN SERPL-MCNC: 14 MG/DL
CALCIUM SERPL-MCNC: 9.8 MG/DL
CHLORIDE SERPL-SCNC: 106 MMOL/L
CHOLEST SERPL-MCNC: 214 MG/DL
CO2 SERPL-SCNC: 27 MMOL/L
COLOR: YELLOW
CREAT SERPL-MCNC: 0.9 MG/DL
EGFR: 74 ML/MIN/1.73M2
EOSINOPHIL # BLD AUTO: 0.31 K/UL
EOSINOPHIL NFR BLD AUTO: 5.8 %
ESTIMATED AVERAGE GLUCOSE: 117 MG/DL
GLUCOSE QUALITATIVE U: NEGATIVE
GLUCOSE SERPL-MCNC: 90 MG/DL
HBA1C MFR BLD HPLC: 5.7 %
HCT VFR BLD CALC: 37.5 %
HDLC SERPL-MCNC: 79 MG/DL
HGB BLD-MCNC: 11.6 G/DL
HYALINE CASTS: 3 /LPF
IMM GRANULOCYTES NFR BLD AUTO: 0.2 %
KETONES URINE: NEGATIVE
LDLC SERPL CALC-MCNC: 122 MG/DL
LEUKOCYTE ESTERASE URINE: ABNORMAL
LYMPHOCYTES # BLD AUTO: 2.66 K/UL
LYMPHOCYTES NFR BLD AUTO: 49.6 %
MAGNESIUM SERPL-MCNC: 2.2 MG/DL
MAN DIFF?: NORMAL
MCHC RBC-ENTMCNC: 27.6 PG
MCHC RBC-ENTMCNC: 30.9 GM/DL
MCV RBC AUTO: 89.3 FL
MICROSCOPIC-UA: NORMAL
MONOCYTES # BLD AUTO: 0.49 K/UL
MONOCYTES NFR BLD AUTO: 9.1 %
NEUTROPHILS # BLD AUTO: 1.84 K/UL
NEUTROPHILS NFR BLD AUTO: 34.4 %
NITRITE URINE: NEGATIVE
NONHDLC SERPL-MCNC: 135 MG/DL
NT-PROBNP SERPL-MCNC: 140 PG/ML
PH URINE: 6
PLATELET # BLD AUTO: 192 K/UL
POTASSIUM SERPL-SCNC: 4.9 MMOL/L
PROT SERPL-MCNC: 6.5 G/DL
PROTEIN URINE: NEGATIVE
RBC # BLD: 4.2 M/UL
RBC # FLD: 14.6 %
RED BLOOD CELLS URINE: 1 /HPF
SODIUM SERPL-SCNC: 143 MMOL/L
SPECIFIC GRAVITY URINE: 1.01
SQUAMOUS EPITHELIAL CELLS: 5 /HPF
TRIGL SERPL-MCNC: 65 MG/DL
TSH SERPL-ACNC: 3.78 UIU/ML
UROBILINOGEN URINE: NORMAL
WBC # FLD AUTO: 5.36 K/UL
WHITE BLOOD CELLS URINE: 1 /HPF

## 2022-08-15 ENCOUNTER — APPOINTMENT (OUTPATIENT)
Dept: CARDIOLOGY | Facility: CLINIC | Age: 62
End: 2022-08-15

## 2022-08-16 ENCOUNTER — APPOINTMENT (OUTPATIENT)
Dept: ORTHOPEDIC SURGERY | Facility: CLINIC | Age: 62
End: 2022-08-16

## 2022-08-16 VITALS — WEIGHT: 220 LBS | HEIGHT: 66 IN | BODY MASS INDEX: 35.36 KG/M2

## 2022-08-16 PROCEDURE — 99024 POSTOP FOLLOW-UP VISIT: CPT

## 2022-08-16 PROCEDURE — 73562 X-RAY EXAM OF KNEE 3: CPT | Mod: LT

## 2022-08-16 NOTE — HISTORY OF PRESENT ILLNESS
[6] : 6 [5] : 5 [Dull/Aching] : dull/aching [] : yes [de-identified] : 8/16/22: 10 weeks s/p L TKA doing okay with some pain. Has not been able to attend PT due to auth issues - Feels the recovery for the left knee is significantly worse\par \par PRev Doc:\par 8/17/21: NC from Dr. Novoa. Right > left knee pain for several years, worsening. Injections in the past with mild improvement. Had zilretta both knees 1 month ago which helped a lot. Back fusion 2018 and feels knees are worse since that. Had right groin pain a few months ago which improved after hip inj. Pain with stairs. Gastric sleeve a few months ago - lost 42 lbs. Recent A1c 6.2.\par 9/27/21 she is ready for TKA this week but left knee is severe and zillretta worse\par 10/12/21: 2 wks s/p right knee TKA. Doing better than before sx. Struggling with pain at night. Denies fever/chills. PT reports pain meds use, but is trying to decreased.\par 12/13/21: Right knee min pain, stopped PT. left knee worsening pain.\par 2/14/22: Worsening left knee pain.\par 5/24/22: continued and worsening pain in b/l knees- Scheduled for TKA 6/8/22. Ziretta helped for one month\par 6/21/22 doing ok but feels this was more painful - no fever no chills\par

## 2022-08-16 NOTE — IMAGING
[Left] : left knee [AP] : anteroposterior [Lateral] : lateral [[place]] : skyline [Components well fixed, in good position] : Components well fixed, in good position [de-identified] : left knee:\par inc c/d/i\par NVI\par Strenth 5/5\par Pulses +2 DP/PT\par Decreased ROM - 5-95\par using cane\par \par left knee xray\par componets fixed good positon

## 2022-08-16 NOTE — DISCUSSION/SUMMARY
[de-identified] : We discussed the patient's progress and they were reminded of their antibiotic prophylaxis.  We discussed continued physical therapy and/or a home exercise program.  Questions about their knee replacement and future follow up were answered and discussed.The incision was inspected and was clean and dry with no drainage.  The patient was instructed to call for fevers, chills, wound drainage, wound opening, redness, or any other concerns.\par

## 2022-08-16 NOTE — ASSESSMENT
[FreeTextEntry1] : Severe OA both knees. Discussed options - failed injections, NSAIDs and PT in the past and has pain with ADLs - ROM right knee only to about 80, left to about 100. Will plan for right TKA. Last A1c 6.2, did well with weight loss after gastric sleeve. No h/O DVT. Concern for poor post op response due to ongoing poor ROM and mechanics. Advised patient that post op, would have greater difficulty in recovery & PT. Discussed procedure, recovery process, risks and benefits of TKA. 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 Discussed pain mgmt expectations.\par 9/27/21 - she is planning TKA SAILAJA this week rt knee - left knee is severe and would like injection today \par 10/12/21: 2 wks s/p Right TKA. Doing well. PT reports trouble sleeping/heaviness feeling in the knee. this is normal Xrays well fixed. Discussed Post operative protocol. No bathing submerging 2-3 wks. Discussed importance of continued elevation. Renewed oxycodone. Continue HEP and PT\par 11/8 rt knee doing well happy ROM is 100 started at 80 -- Left knee is worse had recent Inj - we will try duexis that helped her in the past and if cont pain may repeat zilretta - she may need TKA on the left at some pain we discussed this today\par 12/13/21: Right TKA doing very well, cont HEP. Left knee worsening - inj today tolerated well, needs auth for zilretta and will return for this as soon as allowed. Considering TKA in the spring.\par 2/14/22: Left knee zilretta inj today tolerated well - plan for left TKA with sailaja after 5/14/22. Need CT to eval for bone loss.\par 5/24/22: left knee pain getting worse. Scheduled for L TKA in June. Risks and benefits discussed and all questions answered. recently had R TKA in September with no issues\par 6/21/22: 2 weeks postop L TKA still having a lot of pain. Switch pain meds to dilaudid, start PT and fu in 4 weeks\par \par 8/16/22: 2 months postop L TKA doing okay with a some pain on the lateral side - She recently went on vacation and had an increase in pain likely due to aggregation of knee - Renewed tramadol and follow up in 6 weeks - Right knee doing well. Back to work 8.29 - does have effusion - hold on aspiration may do it next visit

## 2022-08-25 ENCOUNTER — RX ONLY (RX ONLY)
Age: 62
End: 2022-08-25

## 2022-08-25 ENCOUNTER — OFFICE (OUTPATIENT)
Dept: URBAN - METROPOLITAN AREA CLINIC 90 | Facility: CLINIC | Age: 62
Setting detail: OPHTHALMOLOGY
End: 2022-08-25
Payer: COMMERCIAL

## 2022-08-25 DIAGNOSIS — H11.133: ICD-10-CM

## 2022-08-25 DIAGNOSIS — H21.233: ICD-10-CM

## 2022-08-25 DIAGNOSIS — H40.013: ICD-10-CM

## 2022-08-25 PROCEDURE — 92250 FUNDUS PHOTOGRAPHY W/I&R: CPT | Performed by: OPHTHALMOLOGY

## 2022-08-25 PROCEDURE — 99204 OFFICE O/P NEW MOD 45 MIN: CPT | Performed by: OPHTHALMOLOGY

## 2022-08-25 ASSESSMENT — REFRACTION_CURRENTRX
OS_CYLINDER: SPHERE
OD_VPRISM_DIRECTION: PROGS
OS_SPHERE: +2.00
OD_OVR_VA: 20/
OD_SPHERE: +2.25
OD_CYLINDER: SPHERE
OD_ADD: +2.25
OS_OVR_VA: 20/
OS_VPRISM_DIRECTION: PROGS
OS_ADD: +2.25

## 2022-08-25 ASSESSMENT — SPHEQUIV_DERIVED
OD_SPHEQUIV: 2.375
OS_SPHEQUIV: 2.1

## 2022-08-25 ASSESSMENT — KERATOMETRY
OS_AXISANGLE_DEGREES: 075
OS_K2POWER_DIOPTERS: 45.50
OD_AXISANGLE_DEGREES: 087
OD_K1POWER_DIOPTERS: 44.50
OD_K2POWER_DIOPTERS: 45.25
METHOD_AUTO_MANUAL: AUTO
OS_K1POWER_DIOPTERS: 45.00

## 2022-08-25 ASSESSMENT — VISUAL ACUITY
OS_BCVA: 20/20-1
OD_BCVA: 20/25

## 2022-08-25 ASSESSMENT — CORNEAL DYSTROPHY - POSTERIOR
OD_POSTERIORDYSTROPHY: GUTTATA
OS_POSTERIORDYSTROPHY: GUTTATA

## 2022-08-25 ASSESSMENT — CONFRONTATIONAL VISUAL FIELD TEST (CVF)
OS_FINDINGS: FULL
OD_FINDINGS: FULL

## 2022-08-25 ASSESSMENT — REFRACTION_AUTOREFRACTION
OD_AXIS: 096
OS_SPHERE: +2.75
OD_SPHERE: +2.75
OS_AXIS: 099
OD_CYLINDER: -0.75
OS_CYLINDER: -1.30

## 2022-08-25 ASSESSMENT — TONOMETRY
OS_IOP_MMHG: 10
OD_IOP_MMHG: 10

## 2022-08-25 ASSESSMENT — AXIALLENGTH_DERIVED
OS_AL: 22.2161
OD_AL: 22.2433

## 2022-09-27 ENCOUNTER — APPOINTMENT (OUTPATIENT)
Dept: ORTHOPEDIC SURGERY | Facility: CLINIC | Age: 62
End: 2022-09-27

## 2022-09-27 VITALS — BODY MASS INDEX: 36.16 KG/M2 | HEIGHT: 66 IN | WEIGHT: 225 LBS

## 2022-09-27 PROCEDURE — 99024 POSTOP FOLLOW-UP VISIT: CPT | Mod: NC

## 2022-09-27 RX ORDER — METHYLPREDNISOLONE 4 MG/1
4 TABLET ORAL
Qty: 1 | Refills: 0 | Status: ACTIVE | COMMUNITY
Start: 2022-09-27 | End: 1900-01-01

## 2022-09-27 RX ORDER — CYCLOBENZAPRINE HYDROCHLORIDE 10 MG/1
10 TABLET, FILM COATED ORAL
Qty: 30 | Refills: 0 | Status: ACTIVE | COMMUNITY
Start: 2022-09-27 | End: 1900-01-01

## 2022-09-27 NOTE — DISCUSSION/SUMMARY
[de-identified] : We discussed the patient's progress and they were reminded of their antibiotic prophylaxis.  We discussed continued physical therapy and/or a home exercise program.  Questions about their knee replacement and future follow up were answered and discussed.The incision was inspected and was clean and dry with no drainage.  The patient was instructed to call for fevers, chills, wound drainage, wound opening, redness, or any other concerns.\par \par Entered by Aleah Preston acting as scribe.\par

## 2022-09-27 NOTE — HISTORY OF PRESENT ILLNESS
[5] : 5 [2] : 2 [de-identified] : 9/27/22: 3.5 months s/p L TKA doing significantly better with HEP. PT was denied. She does not feel the knee is still filled with fluid.\par \par PRev Doc:\par 8/17/21: NC from Dr. Novoa. Right > left knee pain for several years, worsening. Injections in the past with mild improvement. Had zilretta both knees 1 month ago which helped a lot. Back fusion 2018 and feels knees are worse since that. Had right groin pain a few months ago which improved after hip inj. Pain with stairs. Gastric sleeve a few months ago - lost 42 lbs. Recent A1c 6.2.\par 9/27/21 she is ready for TKA this week but left knee is severe and zillretta worse\par 10/12/21: 2 wks s/p right knee TKA. Doing better than before sx. Struggling with pain at night. Denies fever/chills. PT reports pain meds use, but is trying to decreased.\par 12/13/21: Right knee min pain, stopped PT. left knee worsening pain.\par 2/14/22: Worsening left knee pain.\par 5/24/22: continued and worsening pain in b/l knees- Scheduled for TKA 6/8/22. Ziretta helped for one month\par 6/21/22 doing ok but feels this was more painful - no fever no chills\par 8/16/22: 10 weeks s/p L TKA doing okay with some pain. Has not been able to attend PT due to auth issues - Feels the recovery for the left knee is significantly worse

## 2022-09-27 NOTE — IMAGING
[de-identified] : left knee:\par inc c/d/i\par NVI\par Strenth 5/5\par Pulses +2 DP/PT\par Decreased ROM - 5-95\par using cane\par

## 2022-10-05 NOTE — DIETITIAN INITIAL EVALUATION ADULT. - ENERGY INTAKE
Patient is a 2y1m old  Male who presents with a chief complaint of pain on upper front teeth.     HPI: Mother states that patient has been experiencing pain on upper front teeth since yesterday. Pain does not wake him up at night.       PAST MEDICAL & SURGICAL HISTORY:    ( -  ) heart valve replacement  (-   ) joint replacement      MEDICATIONS  (STANDING):    MEDICATIONS  (PRN):      Allergies      Intolerances        FAMILY HISTORY:      *SOCIAL HISTORY: (   ) Tobacco; (   ) ETOH    *Last Dental Visit:    Vital Signs Last 24 Hrs  T(C): 36.5 (05 Oct 2022 12:15), Max: 36.5 (05 Oct 2022 12:15)  T(F): 97.7 (05 Oct 2022 12:15), Max: 97.7 (05 Oct 2022 12:15)  HR: 99 (05 Oct 2022 12:15) (99 - 99)  BP: --  BP(mean): --  RR: 21 (05 Oct 2022 12:15) (21 - 21)  SpO2: 99% (05 Oct 2022 12:15) (99% - 99%)    Parameters below as of 05 Oct 2022 12:15  Patient On (Oxygen Delivery Method): room air        LABS:                  EOE:  TMJ (  - ) clicks                     ( -  ) pops                     (  - ) crepitus                      ( -  ) trismus             ( -  ) lymphadenopathy             (  - ) swelling             ( -  ) asymmetry             ( -  ) palpation             ( -  ) dyspnea             (  - ) dysphagia             ( -  ) loss of consciousness    IOE:  primary dentition, multiple carious teeth           hard/soft palate: No pathology noted           tongue/FOM: No pathology noted           labial/buccal mucosa: No pathology noted           ( -  ) percussion           ( -  ) palpation           ( -  ) swelling            ( -  ) abscess           ( -  ) sinus tract    Dentition present: <<   >>  Mobility: <<  >>  Caries: <<   >>         *DENTAL RADIOGRAPHS: N/A        *ASSESSMENT: Patient is a 2y1m old  Male who presents with a chief complaint of pain on upper front teeth.  Mother states that patient has been experiencing pain on upper front teeth since yesterday. Pain does not wake him up at night. No extraoral swelling noted. Intraoral exam: no swelling,  no abscess, no fistula noted. Teeth #A,C,D,E,F,G,J,K,T are grossly decayed. Explained to mother that patient has multiple carious teeth and due to extent of treatment and patient behavior complete oral rehabilitation under general anesthesia is recommended. However, the patient's insurance is not accepted at [facility]. Patient to return to assigned dentist. Mother states agrees and understands.             RECOMMENDATIONS:  1) Amoxicillin 125/5cc 1 tsp,TID  2) Dental F/U with outpatient dentist for comprehensive dental care.   3) If any difficulty swallowing/breathing, fever occur, return to ER.     Jenn Smith DDS pager #[phone]
pt currently on bariatric clear liquid diet/Fair (>50%)

## 2022-11-28 ENCOUNTER — APPOINTMENT (OUTPATIENT)
Dept: ORTHOPEDIC SURGERY | Facility: CLINIC | Age: 62
End: 2022-11-28
Payer: COMMERCIAL

## 2022-11-28 ENCOUNTER — LABORATORY RESULT (OUTPATIENT)
Age: 62
End: 2022-11-28

## 2022-11-28 VITALS — WEIGHT: 225 LBS | BODY MASS INDEX: 36.16 KG/M2 | HEIGHT: 66 IN

## 2022-11-28 PROCEDURE — 73562 X-RAY EXAM OF KNEE 3: CPT | Mod: LT

## 2022-11-28 PROCEDURE — 99214 OFFICE O/P EST MOD 30 MIN: CPT | Mod: 25

## 2022-11-28 PROCEDURE — 20611 DRAIN/INJ JOINT/BURSA W/US: CPT

## 2022-11-28 NOTE — DISCUSSION/SUMMARY
[de-identified] : We discussed the patient's progress and they were reminded of their antibiotic prophylaxis.  We discussed continued physical therapy and/or a home exercise program.  Questions about their knee replacement and future follow up were answered and discussed.The incision was inspected and was clean and dry with no drainage.  The patient was instructed to call for fevers, chills, wound drainage, wound opening, redness, or any other concerns.\par \par Note complete by Trevor Ruffin PA-C acting as scribe.

## 2022-11-28 NOTE — HISTORY OF PRESENT ILLNESS
[5] : 5 [3] : 3 [Tightness] : tightness [Frequent] : frequent [Leisure] : leisure [Heat] : heat [Full time] : Work status: full time [de-identified] : 11/28/22: Still has swelling and stiffness in left knee.  No real change.  MDP helped shoulder for a short period then pain returned - radiates to hand with tingling at night.  Still feels she needs tramadol.\par \par PRev Doc:\par 8/17/21: NC from Dr. Novoa. Right > left knee pain for several years, worsening. Injections in the past with mild improvement. Had zilretta both knees 1 month ago which helped a lot. Back fusion 2018 and feels knees are worse since that. Had right groin pain a few months ago which improved after hip inj. Pain with stairs. Gastric sleeve a few months ago - lost 42 lbs. Recent A1c 6.2.\par 9/27/21 she is ready for TKA this week but left knee is severe and zillretta worse\par 10/12/21: 2 wks s/p right knee TKA. Doing better than before sx. Struggling with pain at night. Denies fever/chills. PT reports pain meds use, but is trying to decreased.\par 12/13/21: Right knee min pain, stopped PT. left knee worsening pain.\par 2/14/22: Worsening left knee pain.\par 5/24/22: continued and worsening pain in b/l knees- Scheduled for TKA 6/8/22. Ziretta helped for one month\par 6/21/22 doing ok but feels this was more painful - no fever no chills\par 8/16/22: 10 weeks s/p L TKA doing okay with some pain. Has not been able to attend PT due to auth issues - Feels the recovery for the left knee is significantly worse\par 9/27/22: 3.5 months s/p L TKA doing significantly better with HEP. PT was denied. She does not feel the knee is still filled with fluid. [] : no [FreeTextEntry5] : patient states she is still having numbness and tingling in her left shoulder. patient states her left knee has been improving. 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

## 2022-11-28 NOTE — ASSESSMENT
[FreeTextEntry1] : Severe OA both knees. Discussed options - failed injections, NSAIDs and PT in the past and has pain with ADLs - ROM right knee only to about 80, left to about 100. Will plan for right TKA. Last A1c 6.2, did well with weight loss after gastric sleeve. No h/O DVT. Concern for poor post op response due to ongoing poor ROM and mechanics. Advised patient that post op, would have greater difficulty in recovery & PT. Discussed procedure, recovery process, risks and benefits of TKA. 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 Discussed pain mgmt expectations.\par 9/27/21 - she is planning TKA SAILAJA this week rt knee - left knee is severe and would like injection today \par 10/12/21: 2 wks s/p Right TKA. Doing well. PT reports trouble sleeping/heaviness feeling in the knee. this is normal Xrays well fixed. Discussed Post operative protocol. No bathing submerging 2-3 wks. Discussed importance of continued elevation. Renewed oxycodone. Continue HEP and PT\par 11/8 rt knee doing well happy ROM is 100 started at 80 -- Left knee is worse had recent Inj - we will try duexis that helped her in the past and if cont pain may repeat zilretta - she may need TKA on the left at some pain we discussed this today\par 12/13/21: Right TKA doing very well, cont HEP. Left knee worsening - inj today tolerated well, needs auth for zilretta and will return for this as soon as allowed. Considering TKA in the spring.\par 2/14/22: Left knee zilretta inj today tolerated well - plan for left TKA with sailaja after 5/14/22. Need CT to eval for bone loss.\par 5/24/22: left knee pain getting worse. Scheduled for L TKA in June. Risks and benefits discussed and all questions answered. recently had R TKA in September with no issues\par 6/21/22: 2 weeks postop L TKA still having a lot of pain. Switch pain meds to dilaudid, start PT and fu in 4 weeks\par 8/16/22: 2 months postop L TKA doing okay with a some pain on the lateral side - She recently went on vacation and had an increase in pain likely due to aggregation of knee - Renewed tramadol and follow up in 6 weeks - Right knee doing well. Back to work 8.29 - does have effusion - hold on aspiration may do it next visit \par 9/27/22: Knee slowly improving, only mild effusion but I don’t feel need for asp at this point and her ROM is similar to other side. Recent left shoulder pain, numbness and tingling - Recommend cyclobenzaprine and MDP. If continues, will do a further workup.\par \par 11/28/22: Cont left knee pain and effusion, no XR change today, looks well fixed.  ROM equal both knees.  Asp today and reeval in 4 weeks.  Cont pain and numbness in left arm - MRI Cspine and have eval by spine specialist.

## 2022-11-28 NOTE — IMAGING
[Left] : left knee [AP] : anteroposterior [Lateral] : lateral [[place]] : skyline [Components well fixed, in good position] : Components well fixed, in good position [de-identified] : left knee:\par inc c/d/i\par NVI\par Strenth 5/5\par Pulses +2 DP/PT\par Decreased ROM - 5-95\par using cane\par

## 2022-12-03 ENCOUNTER — FORM ENCOUNTER (OUTPATIENT)
Age: 62
End: 2022-12-03

## 2022-12-04 ENCOUNTER — APPOINTMENT (OUTPATIENT)
Dept: MRI IMAGING | Facility: CLINIC | Age: 62
End: 2022-12-04

## 2022-12-04 PROCEDURE — 72141 MRI NECK SPINE W/O DYE: CPT

## 2022-12-13 LAB
B PERT IGG+IGM PNL SER: ABNORMAL
COLOR FLD: NORMAL
EOSINOPHIL # FLD MANUAL: 0 %
FLUID INTAKE SUBSTANCE CLASS: NORMAL
LYMPHOCYTES # FLD MANUAL: 53 %
MESOTHL CELL NFR FLD: 0 %
MONOS+MACROS NFR FLD MANUAL: 38 %
NEUTS SEG # FLD MANUAL: 9 %
NRBC # FLD: 0 %
RBC # FLD MANUAL: ABNORMAL /UL
TOTAL CELLS COUNTED FLD: 184 /UL
TUBE TYPE: NORMAL
UNIDENT CELLS NFR FLD MANUAL: 0 %
VARIANT LYMPHS # FLD MANUAL: 0 %

## 2022-12-14 ENCOUNTER — TRANSCRIPTION ENCOUNTER (OUTPATIENT)
Age: 62
End: 2022-12-14

## 2022-12-16 ENCOUNTER — APPOINTMENT (OUTPATIENT)
Dept: ORTHOPEDIC SURGERY | Facility: CLINIC | Age: 62
End: 2022-12-16

## 2022-12-16 VITALS — WEIGHT: 225 LBS | HEIGHT: 66 IN | BODY MASS INDEX: 36.16 KG/M2

## 2022-12-16 DIAGNOSIS — M54.12 RADICULOPATHY, CERVICAL REGION: ICD-10-CM

## 2022-12-16 PROCEDURE — 99214 OFFICE O/P EST MOD 30 MIN: CPT

## 2022-12-16 NOTE — DATA REVIEWED
[FreeTextEntry1] : MRI C-spine ([facility]) 12/4/22\par Developmentally small spinal canal complicated by multilevel cervical degenerative disc disease with disc herniations from C2-3 through T1-2. There are multiple levels of mild spinal cord compression as well as significant compression of the left C8 nerve root at C7-T1.

## 2022-12-16 NOTE — IMAGING
[Left] : left knee [AP] : anteroposterior [Lateral] : lateral [[place]] : skyline [Components well fixed, in good position] : Components well fixed, in good position [de-identified] : left knee:\par inc c/d/i\par NVI\par Strenth 5/5\par Pulses +2 DP/PT\par Decreased ROM - 5-95\par using cane\par \par \par

## 2022-12-16 NOTE — HISTORY OF PRESENT ILLNESS
[4] : 4 [Dull/Aching] : dull/aching [de-identified] : 12/16/22: Here for MRI review. Had asp of the L knee at last visit. States L knee is better than last visit. C-spine pain has somewhat improved. \par \par PRev Doc:\par 8/17/21: NC from Dr. Novoa. Right > left knee pain for several years, worsening. Injections in the past with mild improvement. Had zilretta both knees 1 month ago which helped a lot. Back fusion 2018 and feels knees are worse since that. Had right groin pain a few months ago which improved after hip inj. Pain with stairs. Gastric sleeve a few months ago - lost 42 lbs. Recent A1c 6.2.\par 9/27/21 she is ready for TKA this week but left knee is severe and zillretta worse\par 10/12/21: 2 wks s/p right knee TKA. Doing better than before sx. Struggling with pain at night. Denies fever/chills. PT reports pain meds use, but is trying to decreased.\par 12/13/21: Right knee min pain, stopped PT. left knee worsening pain.\par 2/14/22: Worsening left knee pain.\par 5/24/22: continued and worsening pain in b/l knees- Scheduled for TKA 6/8/22. Ziretta helped for one month\par 6/21/22 doing ok but feels this was more painful - no fever no chills\par 8/16/22: 10 weeks s/p L TKA doing okay with some pain. Has not been able to attend PT due to auth issues - Feels the recovery for the left knee is significantly worse\par 9/27/22: 3.5 months s/p L TKA doing significantly better with HEP. PT was denied. She does not feel the knee is still filled with fluid.\par 11/28/22: Still has swelling and stiffness in left knee.  No real change.  MDP helped shoulder for a short period then pain returned - radiates to hand with tingling at night.  Still feels she needs tramadol.

## 2022-12-16 NOTE — ASSESSMENT
[FreeTextEntry1] : Severe OA both knees. Discussed options - failed injections, NSAIDs and PT in the past and has pain with ADLs - ROM right knee only to about 80, left to about 100. Will plan for right TKA. Last A1c 6.2, did well with weight loss after gastric sleeve. No h/O DVT. Concern for poor post op response due to ongoing poor ROM and mechanics. Advised patient that post op, would have greater difficulty in recovery & PT. Discussed procedure, recovery process, risks and benefits of TKA. 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 Discussed pain mgmt expectations.\par 9/27/21 - she is planning TKA SAILAJA this week rt knee - left knee is severe and would like injection today \par 10/12/21: 2 wks s/p Right TKA. Doing well. PT reports trouble sleeping/heaviness feeling in the knee. this is normal Xrays well fixed. Discussed Post operative protocol. No bathing submerging 2-3 wks. Discussed importance of continued elevation. Renewed oxycodone. Continue HEP and PT\par 11/8 rt knee doing well happy ROM is 100 started at 80 -- Left knee is worse had recent Inj - we will try duexis that helped her in the past and if cont pain may repeat zilretta - she may need TKA on the left at some pain we discussed this today\par 12/13/21: Right TKA doing very well, cont HEP. Left knee worsening - inj today tolerated well, needs auth for zilretta and will return for this as soon as allowed. Considering TKA in the spring.\par 2/14/22: Left knee zilretta inj today tolerated well - plan for left TKA with sailaja after 5/14/22. Need CT to eval for bone loss.\par 5/24/22: left knee pain getting worse. Scheduled for L TKA in June. Risks and benefits discussed and all questions answered. recently had R TKA in September with no issues\par 6/21/22: 2 weeks postop L TKA still having a lot of pain. Switch pain meds to dilaudid, start PT and fu in 4 weeks\par 8/16/22: 2 months postop L TKA doing okay with a some pain on the lateral side - She recently went on vacation and had an increase in pain likely due to aggregation of knee - Renewed tramadol and follow up in 6 weeks - Right knee doing well. Back to work 8.29 - does have effusion - hold on aspiration may do it next visit \par 9/27/22: Knee slowly improving, only mild effusion but I don’t feel need for asp at this point and her ROM is similar to other side. Recent left shoulder pain, numbness and tingling - Recommend cyclobenzaprine and MDP. If continues, will do a further workup.\par 11/28/22: Cont left knee pain and effusion, no XR change today, looks well fixed.  ROM equal both knees.  Asp today and reeval in 4 weeks.  Cont pain and numbness in left arm - MRI Cspine and have eval by spine specialist.\par \par 12/16/22: Left knee is doing much better after aspiration. No signs of infection. 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

## 2022-12-21 ENCOUNTER — NON-APPOINTMENT (OUTPATIENT)
Age: 62
End: 2022-12-21

## 2022-12-21 ENCOUNTER — APPOINTMENT (OUTPATIENT)
Dept: CARDIOLOGY | Facility: CLINIC | Age: 62
End: 2022-12-21

## 2022-12-21 VITALS
OXYGEN SATURATION: 99 % | TEMPERATURE: 98.8 F | BODY MASS INDEX: 35.84 KG/M2 | DIASTOLIC BLOOD PRESSURE: 70 MMHG | HEART RATE: 85 BPM | WEIGHT: 223 LBS | HEIGHT: 66 IN | SYSTOLIC BLOOD PRESSURE: 135 MMHG

## 2022-12-21 DIAGNOSIS — R09.81 NASAL CONGESTION: ICD-10-CM

## 2022-12-21 DIAGNOSIS — Z71.85 ENCOUNTER FOR IMMUNIZATION SAFETY COUNSELING: ICD-10-CM

## 2022-12-21 PROCEDURE — 93000 ELECTROCARDIOGRAM COMPLETE: CPT

## 2022-12-21 PROCEDURE — 99214 OFFICE O/P EST MOD 30 MIN: CPT | Mod: 25

## 2022-12-21 NOTE — PHYSICAL EXAM

## 2022-12-21 NOTE — HISTORY OF PRESENT ILLNESS
[FreeTextEntry1] : This is a 62 year female with a Pmhx of HTN obesity s/p bariatric surgery s/p b/l TKR most recent june 8th. who presents to the office for cardiac follow up. \par pt reports some body aches congestion took home test negative for covid

## 2023-02-23 ENCOUNTER — OFFICE (OUTPATIENT)
Dept: URBAN - METROPOLITAN AREA CLINIC 90 | Facility: CLINIC | Age: 63
Setting detail: OPHTHALMOLOGY
End: 2023-02-23
Payer: COMMERCIAL

## 2023-02-23 DIAGNOSIS — H18.513: ICD-10-CM

## 2023-02-23 DIAGNOSIS — H21.233: ICD-10-CM

## 2023-02-23 DIAGNOSIS — H40.013: ICD-10-CM

## 2023-02-23 DIAGNOSIS — H25.13: ICD-10-CM

## 2023-02-23 DIAGNOSIS — H11.133: ICD-10-CM

## 2023-02-23 PROCEDURE — 92012 INTRM OPH EXAM EST PATIENT: CPT | Performed by: OPHTHALMOLOGY

## 2023-02-23 PROCEDURE — 92083 EXTENDED VISUAL FIELD XM: CPT | Performed by: OPHTHALMOLOGY

## 2023-02-23 PROCEDURE — 92133 CPTRZD OPH DX IMG PST SGM ON: CPT | Performed by: OPHTHALMOLOGY

## 2023-02-23 ASSESSMENT — VISUAL ACUITY
OS_BCVA: 20/20-2
OD_BCVA: 20/30-1

## 2023-02-23 ASSESSMENT — REFRACTION_CURRENTRX
OS_OVR_VA: 20/
OD_CYLINDER: SPHERE
OS_ADD: +2.25
OD_VPRISM_DIRECTION: PROGS
OD_OVR_VA: 20/
OD_ADD: +2.25
OS_SPHERE: +2.00
OD_SPHERE: +2.25
OS_CYLINDER: SPHERE
OS_VPRISM_DIRECTION: PROGS

## 2023-02-23 ASSESSMENT — SPHEQUIV_DERIVED
OS_SPHEQUIV: 3
OD_SPHEQUIV: 2.75

## 2023-02-23 ASSESSMENT — REFRACTION_AUTOREFRACTION
OD_AXIS: 097
OS_CYLINDER: -1.50
OS_SPHERE: +3.75
OD_SPHERE: +3.50
OS_AXIS: 094
OD_CYLINDER: -1.50

## 2023-02-23 ASSESSMENT — KERATOMETRY
OD_K2POWER_DIOPTERS: 45.00
OS_K1POWER_DIOPTERS: 44.25
OD_K1POWER_DIOPTERS: 45.00
METHOD_AUTO_MANUAL: AUTO
OD_AXISANGLE_DEGREES: 090
OS_AXISANGLE_DEGREES: 016
OS_K2POWER_DIOPTERS: 45.00

## 2023-02-23 ASSESSMENT — AXIALLENGTH_DERIVED
OS_AL: 22.1093
OD_AL: 22.0739

## 2023-02-23 ASSESSMENT — CONFRONTATIONAL VISUAL FIELD TEST (CVF)
OS_FINDINGS: FULL
OD_FINDINGS: FULL

## 2023-02-23 ASSESSMENT — CORNEAL DYSTROPHY - POSTERIOR
OD_POSTERIORDYSTROPHY: MOD GUTTATA
OS_POSTERIORDYSTROPHY: GUTTATA

## 2023-02-24 PROBLEM — H25.13 CATARACT SENILE NUCLEAR SCLEROSIS; BOTH EYES: Status: ACTIVE | Noted: 2023-02-23

## 2023-02-24 PROBLEM — H18.513 ENDOTHELIAL CORNEAL DYSTROPHY; BOTH EYES: Status: ACTIVE | Noted: 2023-02-23

## 2023-06-26 NOTE — ED ADULT TRIAGE NOTE - NS ED TRIAGE AVPU SCALE
Alert-The patient is alert, awake and responds to voice. The patient is oriented to time, place, and person. The triage nurse is able to obtain subjective information. Hydroxyzine Counseling: Patient advised that the medication is sedating and not to drive a car after taking this medication.  Patient informed of potential adverse effects including but not limited to dry mouth, urinary retention, and blurry vision.  The patient verbalized understanding of the proper use and possible adverse effects of hydroxyzine.  All of the patient's questions and concerns were addressed.

## 2023-07-12 ENCOUNTER — APPOINTMENT (OUTPATIENT)
Dept: CARDIOLOGY | Facility: CLINIC | Age: 63
End: 2023-07-12
Payer: COMMERCIAL

## 2023-07-12 ENCOUNTER — NON-APPOINTMENT (OUTPATIENT)
Age: 63
End: 2023-07-12

## 2023-07-12 VITALS
HEART RATE: 63 BPM | OXYGEN SATURATION: 99 % | WEIGHT: 231 LBS | SYSTOLIC BLOOD PRESSURE: 140 MMHG | BODY MASS INDEX: 37.12 KG/M2 | TEMPERATURE: 98.2 F | HEIGHT: 66 IN | DIASTOLIC BLOOD PRESSURE: 80 MMHG

## 2023-07-12 PROCEDURE — 93000 ELECTROCARDIOGRAM COMPLETE: CPT

## 2023-07-12 PROCEDURE — 99214 OFFICE O/P EST MOD 30 MIN: CPT | Mod: 25

## 2023-07-12 NOTE — HISTORY OF PRESENT ILLNESS
[FreeTextEntry1] : This is a 62 year female with a Pmhx of HTN obesity s/p bariatric surgery s/p b/l TKR . who presents to the office for routine follow up.  pt reports overall feeling well Denies any complaints denies any CP SOB dizziness N//v d fever or chills \par reports poor compliance to BB

## 2023-11-13 ENCOUNTER — APPOINTMENT (OUTPATIENT)
Dept: ENDOCRINOLOGY | Facility: CLINIC | Age: 63
End: 2023-11-13
Payer: COMMERCIAL

## 2023-11-13 VITALS
OXYGEN SATURATION: 99 % | DIASTOLIC BLOOD PRESSURE: 82 MMHG | BODY MASS INDEX: 37.33 KG/M2 | WEIGHT: 232.25 LBS | SYSTOLIC BLOOD PRESSURE: 140 MMHG | HEART RATE: 86 BPM | HEIGHT: 66 IN

## 2023-11-13 LAB
GLUCOSE BLDC GLUCOMTR-MCNC: 149
HBA1C MFR BLD HPLC: 6.5

## 2023-11-13 PROCEDURE — 99204 OFFICE O/P NEW MOD 45 MIN: CPT

## 2023-11-13 PROCEDURE — 82962 GLUCOSE BLOOD TEST: CPT

## 2023-11-13 PROCEDURE — 83036 HEMOGLOBIN GLYCOSYLATED A1C: CPT | Mod: QW

## 2023-11-14 ENCOUNTER — NON-APPOINTMENT (OUTPATIENT)
Age: 63
End: 2023-11-14

## 2023-11-14 LAB
CREAT SPEC-SCNC: 56 MG/DL
MICROALBUMIN 24H UR DL<=1MG/L-MCNC: <1.2 MG/DL
MICROALBUMIN/CREAT 24H UR-RTO: NORMAL MG/G

## 2024-01-08 ENCOUNTER — APPOINTMENT (OUTPATIENT)
Dept: CARDIOLOGY | Facility: CLINIC | Age: 64
End: 2024-01-08
Payer: COMMERCIAL

## 2024-01-08 ENCOUNTER — APPOINTMENT (OUTPATIENT)
Dept: PULMONOLOGY | Facility: CLINIC | Age: 64
End: 2024-01-08
Payer: COMMERCIAL

## 2024-01-08 VITALS
OXYGEN SATURATION: 97 % | DIASTOLIC BLOOD PRESSURE: 82 MMHG | RESPIRATION RATE: 17 BRPM | WEIGHT: 225 LBS | HEART RATE: 86 BPM | BODY MASS INDEX: 36.16 KG/M2 | HEIGHT: 66 IN | SYSTOLIC BLOOD PRESSURE: 130 MMHG | TEMPERATURE: 98.7 F

## 2024-01-08 DIAGNOSIS — E78.00 PURE HYPERCHOLESTEROLEMIA, UNSPECIFIED: ICD-10-CM

## 2024-01-08 DIAGNOSIS — E66.01 MORBID (SEVERE) OBESITY DUE TO EXCESS CALORIES: ICD-10-CM

## 2024-01-08 DIAGNOSIS — Z13.228 ENCOUNTER FOR SCREENING FOR OTHER METABOLIC DISORDERS: ICD-10-CM

## 2024-01-08 DIAGNOSIS — R05.9 COUGH, UNSPECIFIED: ICD-10-CM

## 2024-01-08 DIAGNOSIS — R94.31 ABNORMAL ELECTROCARDIOGRAM [ECG] [EKG]: ICD-10-CM

## 2024-01-08 PROCEDURE — 99214 OFFICE O/P EST MOD 30 MIN: CPT | Mod: 25

## 2024-01-08 PROCEDURE — 93000 ELECTROCARDIOGRAM COMPLETE: CPT

## 2024-01-08 PROCEDURE — 71046 X-RAY EXAM CHEST 2 VIEWS: CPT

## 2024-01-08 RX ORDER — METHYLPREDNISOLONE 4 MG/1
4 TABLET ORAL
Qty: 1 | Refills: 0 | Status: ACTIVE | COMMUNITY
Start: 2024-01-08 | End: 1900-01-01

## 2024-01-08 RX ORDER — BENZONATATE 200 MG/1
200 CAPSULE ORAL
Qty: 40 | Refills: 0 | Status: ACTIVE | COMMUNITY
Start: 2024-01-08 | End: 1900-01-01

## 2024-01-08 RX ORDER — DOXYCYCLINE 100 MG/1
100 TABLET, FILM COATED ORAL
Qty: 14 | Refills: 0 | Status: ACTIVE | COMMUNITY
Start: 2024-01-08 | End: 1900-01-01

## 2024-01-08 NOTE — HISTORY OF PRESENT ILLNESS
[FreeTextEntry1] : This is a 63 year y/o female with a PMHx of HTN, obesity s/p bariatric surgery s/p b/l TKR presents today for follow up. Pt recently saw Dr Whitaker for weight loss management and was rx mounjaro and doing well. Pt reports no SE. Pt reports starting 5 weeks ago she felt a cough and tested negative for COVID at home he has since been to PCP and was given promethazine. Pt reports she is still congested, and cough is still present. Pt reports not compliant with BP. Patient denies chest pain, dyspnea, palpitations, dizziness, syncope, changes in bowel/bladder habits or appetite

## 2024-01-09 ENCOUNTER — NON-APPOINTMENT (OUTPATIENT)
Age: 64
End: 2024-01-09

## 2024-01-09 ENCOUNTER — APPOINTMENT (OUTPATIENT)
Dept: ORTHOPEDIC SURGERY | Facility: CLINIC | Age: 64
End: 2024-01-09
Payer: COMMERCIAL

## 2024-01-09 VITALS — HEIGHT: 66 IN | BODY MASS INDEX: 36.16 KG/M2 | WEIGHT: 225 LBS

## 2024-01-09 DIAGNOSIS — M17.12 UNILATERAL PRIMARY OSTEOARTHRITIS, LEFT KNEE: ICD-10-CM

## 2024-01-09 LAB
25(OH)D3 SERPL-MCNC: 53.3 NG/ML
ALBUMIN SERPL ELPH-MCNC: 4.2 G/DL
ALP BLD-CCNC: 115 U/L
ALT SERPL-CCNC: 16 U/L
ANION GAP SERPL CALC-SCNC: 12 MMOL/L
APPEARANCE: CLEAR
AST SERPL-CCNC: 20 U/L
BACTERIA: ABNORMAL /HPF
BASOPHILS # BLD AUTO: 0.04 K/UL
BASOPHILS NFR BLD AUTO: 0.7 %
BILIRUB SERPL-MCNC: 0.4 MG/DL
BILIRUBIN URINE: NEGATIVE
BLOOD URINE: NEGATIVE
BUN SERPL-MCNC: 10 MG/DL
CALCIUM SERPL-MCNC: 10.2 MG/DL
CAST: 0 /LPF
CHLORIDE SERPL-SCNC: 102 MMOL/L
CHOLEST SERPL-MCNC: 225 MG/DL
CO2 SERPL-SCNC: 27 MMOL/L
COLOR: NORMAL
CREAT SERPL-MCNC: 0.81 MG/DL
CREAT SPEC-SCNC: 75 MG/DL
EGFR: 83 ML/MIN/1.73M2
EOSINOPHIL # BLD AUTO: 0.21 K/UL
EOSINOPHIL NFR BLD AUTO: 3.4 %
EPITHELIAL CELLS: 11 /HPF
ESTIMATED AVERAGE GLUCOSE: 123 MG/DL
GLUCOSE QUALITATIVE U: NEGATIVE MG/DL
GLUCOSE SERPL-MCNC: 82 MG/DL
HBA1C MFR BLD HPLC: 5.9 %
HCT VFR BLD CALC: 41.3 %
HDLC SERPL-MCNC: 77 MG/DL
HGB BLD-MCNC: 13.2 G/DL
IMM GRANULOCYTES NFR BLD AUTO: 0.2 %
KETONES URINE: NEGATIVE MG/DL
LDLC SERPL CALC-MCNC: 137 MG/DL
LEUKOCYTE ESTERASE URINE: NEGATIVE
LYMPHOCYTES # BLD AUTO: 2.4 K/UL
LYMPHOCYTES NFR BLD AUTO: 39.2 %
MAN DIFF?: NORMAL
MCHC RBC-ENTMCNC: 27.6 PG
MCHC RBC-ENTMCNC: 32 GM/DL
MCV RBC AUTO: 86.4 FL
MICROALBUMIN 24H UR DL<=1MG/L-MCNC: <1.2 MG/DL
MICROALBUMIN/CREAT 24H UR-RTO: NORMAL MG/G
MICROSCOPIC-UA: NORMAL
MONOCYTES # BLD AUTO: 0.45 K/UL
MONOCYTES NFR BLD AUTO: 7.3 %
NEUTROPHILS # BLD AUTO: 3.02 K/UL
NEUTROPHILS NFR BLD AUTO: 49.2 %
NITRITE URINE: NEGATIVE
NONHDLC SERPL-MCNC: 148 MG/DL
PH URINE: 6.5
PLATELET # BLD AUTO: 213 K/UL
POTASSIUM SERPL-SCNC: 4.5 MMOL/L
PROT SERPL-MCNC: 7.1 G/DL
PROTEIN URINE: NEGATIVE MG/DL
RBC # BLD: 4.78 M/UL
RBC # FLD: 14.2 %
RED BLOOD CELLS URINE: 0 /HPF
SODIUM SERPL-SCNC: 141 MMOL/L
SPECIFIC GRAVITY URINE: 1.01
T4 FREE SERPL-MCNC: 1.3 NG/DL
TRIGL SERPL-MCNC: 65 MG/DL
TSH SERPL-ACNC: 1.59 UIU/ML
UROBILINOGEN URINE: 0.2 MG/DL
WBC # FLD AUTO: 6.13 K/UL
WHITE BLOOD CELLS URINE: 1 /HPF

## 2024-01-09 PROCEDURE — 99214 OFFICE O/P EST MOD 30 MIN: CPT | Mod: 25

## 2024-01-09 PROCEDURE — 73562 X-RAY EXAM OF KNEE 3: CPT | Mod: 50

## 2024-01-09 PROCEDURE — 20611 DRAIN/INJ JOINT/BURSA W/US: CPT | Mod: LT

## 2024-01-09 RX ORDER — TRAMADOL HYDROCHLORIDE 50 MG/1
50 TABLET, COATED ORAL
Qty: 30 | Refills: 0 | Status: ACTIVE | COMMUNITY
Start: 2022-08-16 | End: 1900-01-01

## 2024-01-09 NOTE — PROCEDURE
[Large Joint Injection] : Large joint injection [Effusion] : effusion [Cell count/dif] : cell count/dif [GS] : GS [Culture] : culture [Crystals] : crystals [] : Patient tolerated procedure well [Call if redness, pain or fever occur] : call if redness, pain or fever occur [Patient had decreased mobility in the joint] : patient had decreased mobility in the joint [Risks, benefits, alternatives discussed / Verbal consent obtained] : the risks benefits, and alternatives have been discussed, and verbal consent was obtained [All ultrasound images have been permanently captured and stored accordingly in our picture archiving and communication system] : All ultrasound images have been permanently captured and stored accordingly in our picture archiving and communication system [Visualization of the needle and placement of injection was performed without complication] : visualization of the needle and placement of injection was performed without complication [de-identified] : 5 [de-identified] : clear yellow

## 2024-01-09 NOTE — IMAGING
[de-identified] : LEFT KNEE ROM -2-90 mod effusion 5-10 mm opening laterally 5mm opening medially.  RIGHT KNEE no effusion ROM -2-100 3-5mm opening medially, 3-5mm opening laterally sensation intact +2 pt pulses  XR B/L KNEES showing implants fixed and in good position

## 2024-01-09 NOTE — ASSESSMENT
[FreeTextEntry1] : Severe OA both knees. Discussed options - failed injections, NSAIDs and PT in the past and has pain with ADLs - ROM right knee only to about 80, left to about 100. Will plan for right TKA. Last A1c 6.2, did well with weight loss after gastric sleeve. No h/O DVT. Concern for poor post op response due to ongoing poor ROM and mechanics. Advised patient that post op, would have greater difficulty in recovery & PT. Discussed procedure, recovery process, risks and benefits of TKA. 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 Discussed pain mgmt expectations. 9/27/21 - she is planning TKA SAILAJA this week rt knee - left knee is severe and would like injection today 10/12/21: 2 wks s/p Right TKA. Doing well. PT reports trouble sleeping/heaviness feeling in the knee. this is normal Xrays well fixed. Discussed Post operative protocol. No bathing submerging 2-3 wks. Discussed importance of continued elevation. Renewed oxycodone. Continue HEP and PT 11/8 rt knee doing well happy ROM is 100 started at 80 -- Left knee is worse had recent Inj - we will try duexis that helped her in the past and if cont pain may repeat zilretta - she may need TKA on the left at some pain we discussed this today 12/13/21: Right TKA doing very well, cont HEP. Left knee worsening - inj today tolerated well, needs auth for zilretta and will return for this as soon as allowed. Considering TKA in the spring. 2/14/22: Left knee zilretta inj today tolerated well - plan for left TKA with sailaja after 5/14/22. Need CT to eval for bone loss. 5/24/22: left knee pain getting worse. Scheduled for L TKA in June. Risks and benefits discussed and all questions answered. recently had R TKA in September with no issues 6/21/22: 2 weeks postop L TKA still having a lot of pain. Switch pain meds to dilaudid, start PT and fu in 4 weeks 8/16/22: 2 months postop L TKA doing okay with a some pain on the lateral side - She recently went on vacation and had an increase in pain likely due to aggregation of knee - Renewed tramadol and follow up in 6 weeks - Right knee doing well. Back to work 8.29 - does have effusion - hold on aspiration may do it next visit 9/27/22: Knee slowly improving, only mild effusion but I don't feel need for asp at this point and her ROM is similar to other side. Recent left shoulder pain, numbness and tingling - Recommend cyclobenzaprine and MDP. If continues, will do a further workup. 11/28/22: Cont left knee pain and effusion, no XR change today, looks well fixed. ROM equal both knees. Asp today and reeval in 4 weeks. Cont pain and numbness in left arm - MRI Cspine and have eval by spine specialist. 12/16/22: Left knee is doing much better after aspiration. No signs of infection. 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   1/9/24: Pt is 2.5 years s/p R TKA by me. 1.5 years s/p L TKA by me- some laxity on exam. Discussed conservative vs surgical tx options- risks and benefits explained.  We did discuss the possibility of isolated liner exchange.  Low I think she does have some underlying ligament laxity as her right knee is somewhat lax but not as significant as the left she is well informed and would like to proceed with aspiration on the LT knee- will send to lab. Also will start a course of PT to work on quad strengthening. Given Rx for tramadol. F/up 6 weeks

## 2024-01-09 NOTE — HISTORY OF PRESENT ILLNESS
[de-identified] : 1/9/24: Here for b/l knees. 2.5 years s/p R TKA by me. 1.5 years s/p L TKA by me. LT knee pain x 3 months. No specific injury/trauma. RT knee is doing well. Most pain with sit to stand and walking. States LT knee occ adalberto. Taking motrin/tylenol with good relief but bothers her stomach.   Occ: Nurse  PRev Doc: 8/17/21: NC from Dr. Novoa. Right > left knee pain for several years, worsening. Injections in the past with mild improvement. Had zilretta both knees 1 month ago which helped a lot. Back fusion 2018 and feels knees are worse since that. Had right groin pain a few months ago which improved after hip inj. Pain with stairs. Gastric sleeve a few months ago - lost 42 lbs. Recent A1c 6.2. 9/27/21 she is ready for TKA this week but left knee is severe and zillretta worse 10/12/21: 2 wks s/p right knee TKA. Doing better than before sx. Struggling with pain at night. Denies fever/chills. PT reports pain meds use, but is trying to decreased. 12/13/21: Right knee min pain, stopped PT. left knee worsening pain. 2/14/22: Worsening left knee pain. 5/24/22: continued and worsening pain in b/l knees- Scheduled for TKA 6/8/22. Ziretta helped for one month 6/21/22 doing ok but feels this was more painful - no fever no chills 8/16/22: 10 weeks s/p L TKA doing okay with some pain. Has not been able to attend PT due to auth issues - Feels the recovery for the left knee is significantly worse 9/27/22: 3.5 months s/p L TKA doing significantly better with HEP. PT was denied. She does not feel the knee is still filled with fluid. 11/28/22: Still has swelling and stiffness in left knee.  No real change.  MDP helped shoulder for a short period then pain returned - radiates to hand with tingling at night.  Still feels she needs tramadol. 12/16/22: Here for MRI review. Had asp of the L knee at last visit. States L knee is better than last visit. C-spine pain has somewhat improved.  [FreeTextEntry5] : has seen Dr Husain in the past. left knee pain returned

## 2024-01-09 NOTE — DISCUSSION/SUMMARY
[de-identified] : The patient was advised of the diagnosis.  The natural history of the pathology was explained in full to the patient in layman's terms. All questions were answered.  The risks and benefits of surgical and non-surgical treatment alternatives were explained in full to the patient.  The risks, benefits, contents and alternatives to injection were explained in full to the patient.  Risks outlined include but are not limited to infection, sepsis, bleeding, scarring, skin discoloration, temporary increase in pain, syncopal episode, failure to resolve symptoms, allergic reaction, flare reaction, permanent white skin discoloration, symptom recurrence, and elevation of blood sugar in diabetics.  Patient understood the risks.  All questions were answered.  After discussion of options, patient requested an injection.  Oral informed consent was obtained and sterile prep was done of the injection site.  Sterile technique was used to introduce the mixture.  Patient tolerated the procedure well.  Patient advised to ice the injection site this evening.  Signs and symptoms of infection reviewed and patient advised to call immediately for redness, fevers, and/or chills.  Entered by Neli Andrews acting as a scribe.

## 2024-01-21 NOTE — OCCUPATIONAL THERAPY INITIAL EVALUATION ADULT - LEVEL OF INDEPENDENCE: SUPINE/SIT, REHAB EVAL
History Of Present Illness  Elgin Marin is a 87 y.o. male with past medical history of DMII(A1c of 9 was planned to start insulin but has not started it yet), hypertension, CAD s/p CABG, S/p cardiac cath in4/22 showed patent LIMA to LAD, occluded VG to diagonal occluded VG-OM and VG-or PDA, was medically managed, A-fib on Xarelto, CKD(baseline creatinine 2.8) chronic lower limb wounds, presented to ED with worsening bilateral lower limb pain that has been worsening over the last 4 days prior to admission, associated with subjective fever and chills associated with general fatigue.  Patient's history goes back to several years ago when he started developing blistering of both lower limbs followed by open wounds seen home health care and wound care at home with regular dressing and wound care and wound care management few months ago his wounds completely healed so he stopped home health care and started to apply Ace wraps his legs 4 months ago he started having nonhealing bilateral lower extremity wounds more on the right side that has not healed since then associated with pain purulent drainage patient has been more fatigued over the past couple days prior to admission patient  has been severe enough and is limiting his ability to walk and take care of his wife who is bedridden for the past 2 years for which he sought medical advice.  During his diabetes patient has been on oral antidiabetics he is currently on glimepiride 4 mg twice daily he was previously on Jardiance and has stopped it as he could not afford it, his last A1c is 9 and he was planned to start insulin but has not started it yet as his blood pressure at home and most readings are high 300s or above.  His diabetes is complicated with neuropathy and nephropathy he has been recently started on gabapentin for his pain and was advised to take 100 mg 3 times daily and has noted that he is more lethargic and fatigued.  Patient reported having exertional  shortness of breath that has been going on for the last month or so not associated with chest pain palpitations dizziness or headache.  ED course:  -Vitals blood pressure is 130/63 heart rate 81 temperature 36.7 saturation 99 on room air   -Labs CBC is notable for for white count 8.9 hemoglobin 10.3 MCV of 86 sodium 124 corrected sodium is 32 creatinine is 3 blood glucose of 87,Bicarb of 20, negative BHB ,negative urine ketones.Troponin of 169 increased to 180 increased to 204, EKG atrial fibrillation with T wave inversion in inferior lateral leads changes are similar to previous EKG.  Blood cultures were taken and patient was started on vancomycin Zosyn he was given 10 units of regular insulin blood glucose proved to 345  Patient will be admitted to the floor for management of lower extremity cellulitis and in the settings of poorly controlled diabetes and CKD    Past Medical History  He has a past medical history of Hyperlipidemia, unspecified (11/14/2022), Other forms of dyspnea (08/09/2018), Personal history of other diseases of the circulatory system (12/08/2014), Personal history of other endocrine, nutritional and metabolic disease, Personal history of other specified conditions, Presence of intraocular lens (07/20/2019), Presence of intraocular lens (07/20/2019), and Unspecified atrial fibrillation (CMS/HCC) (11/14/2022).    Surgical History  He has a past surgical history that includes Coronary angioplasty with stent (10/06/2015); Coronary artery bypass graft (10/06/2015); Other surgical history (09/09/2015); Cholecystectomy (09/09/2015); and Rotator cuff repair (09/09/2015).     Social History  Patient has history of smoking in his 20s he never smoked since then, no Etoh  He lives with his wife and his here primary caretaker she is bedbound for 2 years after hip surgery was 61-year-old lives with them and helps with the ADLs, finances and shopping.  He is a retired manager    Family History  Family history  "of diabetes and heart disease in his mother and father     Allergies  Oxycodone, Oxycodone-aspirin, Sulfa (sulfonamide antibiotics), and Warfarin    Review of Systems   Constitutional:  Positive for chills, fatigue and fever.        Endorses upper extremity cramps   Cardiovascular:  Positive for leg swelling. Negative for chest pain.   Gastrointestinal:  Negative for abdominal distention, abdominal pain, diarrhea and vomiting.   Neurological:  Negative for dizziness.      ROS: 12 systems reviewed and negative except per HPI above     Physical Exam by System:     Constitutional: Frail, awake/alert/oriented x3, no distress, alert and cooperative, inpain but not in resp  distress   ENMT: mucous membranes moist   Head/Neck: Neck supple, no bruit   Respiratory/Thorax: Patent airways, CTAB, normal breath sounds with good chest expansion, thorax symmetric   Cardiovascular: irregular, rate and rhythm, no murmurs, 2+ equal pulses of the extremities, normal S 1and S 2   Gastrointestinal: Nondistended, soft, non-tender, no rebound tenderness or guarding, no masses palpable, no organomegaly, +BS, no bruits   Musculoskeletal: ROM intact, no joint swelling, normal strength   Extremities: Bilateral lower extremity swelling, with erythematous warm skin, large open grade II circumferential ulcer on the  right shin, with poorly healing granulation tissue and purulent discharge, multiple smaller wounds with necrotic surfaces, left leg multiple small wounds with variable sizes and ages, draining purulent discharge, dorasliz pedis pulses are not palpable bilaterally, no crepitus,   Neurological: alert and oriented x3, intact senses, motor, response and reflexes, normal strength, impaired pain sensation and proprioception noted more over the right side       Last Recorded Vitals  Blood pressure 132/71, pulse 66, temperature 36.7 °C (98.1 °F), temperature source Temporal, resp. rate 12, height 1.803 m (5' 11\"), weight 82.6 kg (182 lb), " SpO2 98 %.    Relevant Results  Results for orders placed or performed during the hospital encounter of 01/21/24 (from the past 24 hour(s))   Green Top   Result Value Ref Range    Extra Tube Hold for add-ons.    Lavender Top   Result Value Ref Range    Extra Tube Hold for add-ons.    CBC and Auto Differential   Result Value Ref Range    WBC 8.9 4.4 - 11.3 x10*3/uL    nRBC 0.0 0.0 - 0.0 /100 WBCs    RBC 3.68 (L) 4.50 - 5.90 x10*6/uL    Hemoglobin 10.3 (L) 13.5 - 17.5 g/dL    Hematocrit 31.7 (L) 41.0 - 52.0 %    MCV 86 80 - 100 fL    MCH 28.0 26.0 - 34.0 pg    MCHC 32.5 32.0 - 36.0 g/dL    RDW 14.2 11.5 - 14.5 %    Platelets 172 150 - 450 x10*3/uL    Neutrophils % 85.0 40.0 - 80.0 %    Immature Granulocytes %, Automated 0.6 0.0 - 0.9 %    Lymphocytes % 6.2 13.0 - 44.0 %    Monocytes % 7.5 2.0 - 10.0 %    Eosinophils % 0.4 0.0 - 6.0 %    Basophils % 0.3 0.0 - 2.0 %    Neutrophils Absolute 7.59 (H) 1.60 - 5.50 x10*3/uL    Immature Granulocytes Absolute, Automated 0.05 0.00 - 0.50 x10*3/uL    Lymphocytes Absolute 0.55 (L) 0.80 - 3.00 x10*3/uL    Monocytes Absolute 0.67 0.05 - 0.80 x10*3/uL    Eosinophils Absolute 0.04 0.00 - 0.40 x10*3/uL    Basophils Absolute 0.03 0.00 - 0.10 x10*3/uL   Comprehensive Metabolic Panel   Result Value Ref Range    Glucose 487 (HH) 74 - 99 mg/dL    Sodium 124 (L) 136 - 145 mmol/L    Potassium 3.7 3.5 - 5.3 mmol/L    Chloride 90 (L) 98 - 107 mmol/L    Bicarbonate 20 (L) 21 - 32 mmol/L    Anion Gap 18 10 - 20 mmol/L    Urea Nitrogen 103 (HH) 6 - 23 mg/dL    Creatinine 3.09 (H) 0.50 - 1.30 mg/dL    eGFR 19 (L) >60 mL/min/1.73m*2    Calcium 8.2 (L) 8.6 - 10.3 mg/dL    Albumin 3.6 3.4 - 5.0 g/dL    Alkaline Phosphatase 128 33 - 136 U/L    Total Protein 6.3 (L) 6.4 - 8.2 g/dL    AST 30 9 - 39 U/L    Bilirubin, Total 1.2 0.0 - 1.2 mg/dL    ALT 25 10 - 52 U/L   Magnesium   Result Value Ref Range    Magnesium 1.80 1.60 - 2.40 mg/dL   Troponin I, High Sensitivity, Initial   Result Value Ref Range     Troponin I, High Sensitivity 169 (HH) 0 - 20 ng/L   Lactate   Result Value Ref Range    Lactate 1.6 0.4 - 2.0 mmol/L   Beta Hydroxybutyrate   Result Value Ref Range    Beta-Hydroxybutyrate 0.21 0.02 - 0.27 mmol/L   B-type natriuretic peptide   Result Value Ref Range     (H) 0 - 99 pg/mL   BLOOD GAS VENOUS FULL PANEL   Result Value Ref Range    POCT pH, Venous 7.42 7.33 - 7.43 pH    POCT pCO2, Venous 29 (L) 41 - 51 mm Hg    POCT pO2, Venous 43 35 - 45 mm Hg    POCT SO2, Venous 70 45 - 75 %    POCT Oxy Hemoglobin, Venous 68.3 45.0 - 75.0 %    POCT Hematocrit Calculated, Venous 32.0 (L) 41.0 - 52.0 %    POCT Sodium, Venous 123 (L) 136 - 145 mmol/L    POCT Potassium, Venous 3.9 3.5 - 5.3 mmol/L    POCT Chloride, Venous 92 (L) 98 - 107 mmol/L    POCT Ionized Calicum, Venous 1.17 1.10 - 1.33 mmol/L    POCT Glucose, Venous 553 (HH) 74 - 99 mg/dL    POCT Lactate, Venous 2.0 0.4 - 2.0 mmol/L    POCT Base Excess, Venous -4.7 (L) -2.0 - 3.0 mmol/L    POCT HCO3 Calculated, Venous 18.8 (L) 22.0 - 26.0 mmol/L    POCT Hemoglobin, Venous 10.8 (L) 13.5 - 17.5 g/dL    POCT Anion Gap, Venous 16.0 10.0 - 25.0 mmol/L    Patient Temperature      FiO2 28 %    Critical Called By DENIS VIVEROS, RRT     Critical Called To DR. OWEN     Critical Call Time 1328.0000     Critical Read Back Y     Critical Note CRITICAL    Protime-INR   Result Value Ref Range    Protime 22.8 (H) 9.8 - 12.8 seconds    INR 2.0 (H) 0.9 - 1.1   Troponin I, High Sensitivity   Result Value Ref Range    Troponin I, High Sensitivity 180 (HH) 0 - 20 ng/L      Scheduled medications  insulin regular, 10 Units, intravenous, Once      Continuous medications     PRN medications       XR tibia fibula bilateral 2 views    Result Date: 1/21/2024  Interpreted By:  Liliana Denny, STUDY: XR TIBIA FIBULA BILATERAL 2 VIEWS;  1/21/2024 2:17 pm   INDICATION: Signs/Symptoms:assess for gas in tissues.   COMPARISON: None.   ACCESSION NUMBER(S): [id number]   ORDERING CLINICIAN:  YVES HUTCHISON   FINDINGS: Multiple views of the left tibia and fibula are obtained. Degenerative changes of the visualized left knee. Vascular calcification. No definite acute fracture-dislocation. Soft tissue swelling. No definite soft tissue gas..       No acute fracture or dislocation. Soft tissue swelling. No definite soft tissue gas. Follow-up MRI to further evaluate for soft tissue abnormalities or underlying osteomyelitis if these remain clinical concern.     Signed by: Liliana Denny 1/21/2024 3:16 PM Dictation workstation:   [id number]    XR foot 1-2 views bilateral    Result Date: 1/21/2024  Interpreted By:  Liliana Denny, STUDY: XR FOOT 1-2 VIEWS BILATERAL;  1/21/2024 2:17 pm   INDICATION: Signs/Symptoms:assess for gas in tissues.   COMPARISON: None.   ACCESSION NUMBER(S): [id number]   ORDERING CLINICIAN: YVES HUTCHISON   FINDINGS: Multiple views the right foot are obtained. Apparent osteopenia. Degenerative changes of the 1st MTP joint and multiple D IP and PIP joints. Vascular calcification. No evidence of a soft tissue gas. Posterior calcaneal spur. Soft tissue swelling.       No acute fracture or dislocation. Osteopenia and degenerative changes. No definite soft tissue gas. For better evaluation of soft tissue abnormalities or osteomyelitis further evaluation with MRI is recommended.     Signed by: Liliana Denny 1/21/2024 3:14 PM Dictation workstation:   [id number]          Assessment/Plan       Bilateral lower extremity cellulitis  Diabetic ulcer   Diabetic neuropathy  Chronic lymphedema and venous stasis  -Patient has history of lower extremity wounds preceded by blistering for years has been managed in the past by wound care at home.  With past 4 months prior to admission patient noticed progressive bilateral lower limb wounds has not healed since then the pain and worsening swelling over the past month associated with drainage through the open wounds more on the right side past couple days prior to  admission patient has not been able to walk due to pain, since associated fever, chills, and fatigue.  Exam is notable for lower extremity open wounds more on the right side associated with purulent drainage some wounds have black granulation tissue peripheral pulses are not palpable he has decreased pain sensation and impaired proprioception currently skin is erythematous and tender and associated pitting edema. Patient is afebrile  -CBC is notable for normal WBC  -X-ray showed no evidence of gas test, no crepitus on physical exam  -Blood cultures were ordered and patient is started on vancomycin and Zosyn  -Will order CRP and ESR.  -Wound care consulted      #DM II  -Patient has been on glimepiride with high A1c of 9 was planned to be switched to insulin but has not started it yet.  BG is 487 on admission and improved to 345 after 10 units of insulin.  No evidence of DKA, as HCO3 is 20 negative urine ketones negative BHB  -Will DC oral diuretics given his elevated cr.  -Start sliding scale  -Target  -180  With the patient that his probably will be discharged on insulin on his inpatient requirements   -DC glimepiride    -CAD s/p CABG  -S/p cardiac cath and April 25, showed:LIMA to LAD, occluded VG to diagonal occluded VG-OM and VG-or PDA, managed medically  -HFrEF  -Pulmonary HTN  -suspected angina equivqalent  -Patient endorses exertional shortness of breath but denies chest pain EKG showed A-fib with T wave inversion on inferior lateral leads has been noted on previous EKG most likely represent digoxin effect.  Troponin:is 169 trended up to 204 was given aspirin 324 in the ED, Crestor is continued.  -Previous Echo 4/22 showed EF of 40-45%, increased RVSP  Ordered chest x-ray to evaluate for fluid overload the patient is hypovlemic clinically    Atrial fibrillation:  -Patient is A-fib heart rate is controlled continue bisoprolol and Xarelto  - will stop digoxin and his elevated kidney  function      -CKD(baseline cr is 2.8  -cr on admission is 3, send kidney function could be precipitated by overdiuresis or infection.  Will hold diuretics for now  Kidney function test in the morning    -Normocytic anemia  -Hb of 10.6 MCV of 86  -Likely secondary to  CKD  -No signs of active bleeding    -Consults:Wound care  -Telemtery:indicated  -DVT Ppx: Xarelto  -Disposition: Patient will be admitted to acute care with telemetry for his lower extremity cellulitis, started IV antibiotics, 3 to 4 days of inpatient management.    Assessment & plan to be discussed with attending  Lacey Salgado, PGY III           I spent  minutes in the professional and overall care of this patient.    SIGNATURE: Lacey Salgado MD PATIENT NAME: Elgin Marin   DATE: January 21, 2024 MRN: [id number]   TIME: 4:33 PM        independent

## 2024-02-04 ENCOUNTER — RX RENEWAL (OUTPATIENT)
Age: 64
End: 2024-02-04

## 2024-02-20 ENCOUNTER — APPOINTMENT (OUTPATIENT)
Dept: ORTHOPEDIC SURGERY | Facility: CLINIC | Age: 64
End: 2024-02-20
Payer: COMMERCIAL

## 2024-02-20 VITALS — BODY MASS INDEX: 36.16 KG/M2 | WEIGHT: 225 LBS | HEIGHT: 66 IN

## 2024-02-20 PROCEDURE — 99214 OFFICE O/P EST MOD 30 MIN: CPT

## 2024-02-20 NOTE — ASSESSMENT
[FreeTextEntry1] : Severe OA both knees. Discussed options - failed injections, NSAIDs and PT in the past and has pain with ADLs - ROM right knee only to about 80, left to about 100. Will plan for right TKA. Last A1c 6.2, did well with weight loss after gastric sleeve. No h/O DVT. Concern for poor post op response due to ongoing poor ROM and mechanics. Advised patient that post op, would have greater difficulty in recovery & PT. Discussed procedure, recovery process, risks and benefits of TKA. 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 Discussed pain mgmt expectations. 9/27/21 - she is planning TKA SAILAJA this week rt knee - left knee is severe and would like injection today 10/12/21: 2 wks s/p Right TKA. Doing well. PT reports trouble sleeping/heaviness feeling in the knee. this is normal Xrays well fixed. Discussed Post operative protocol. No bathing submerging 2-3 wks. Discussed importance of continued elevation. Renewed oxycodone. Continue HEP and PT 11/8 rt knee doing well happy ROM is 100 started at 80 -- Left knee is worse had recent Inj - we will try duexis that helped her in the past and if cont pain may repeat zilretta - she may need TKA on the left at some pain we discussed this today 12/13/21: Right TKA doing very well, cont HEP. Left knee worsening - inj today tolerated well, needs auth for zilretta and will return for this as soon as allowed. Considering TKA in the spring. 2/14/22: Left knee zilretta inj today tolerated well - plan for left TKA with sailaja after 5/14/22. Need CT to eval for bone loss. 5/24/22: left knee pain getting worse. Scheduled for L TKA in June. Risks and benefits discussed and all questions answered. recently had R TKA in September with no issues 6/21/22: 2 weeks postop L TKA still having a lot of pain. Switch pain meds to dilaudid, start PT and fu in 4 weeks 8/16/22: 2 months postop L TKA doing okay with a some pain on the lateral side - She recently went on vacation and had an increase in pain likely due to aggregation of knee - Renewed tramadol and follow up in 6 weeks - Right knee doing well. Back to work 8.29 - does have effusion - hold on aspiration may do it next visit 9/27/22: Knee slowly improving, only mild effusion but I don't feel need for asp at this point and her ROM is similar to other side. Recent left shoulder pain, numbness and tingling - Recommend cyclobenzaprine and MDP. If continues, will do a further workup. 11/28/22: Cont left knee pain and effusion, no XR change today, looks well fixed. ROM equal both knees. Asp today and reeval in 4 weeks. Cont pain and numbness in left arm - MRI Cspine and have eval by spine specialist. 12/16/22: Left knee is doing much better after aspiration. No signs of infection. 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 1/9/24: Pt is 2.5 years s/p R TKA by me. 1.5 years s/p L TKA by me- some laxity on exam. Discussed conservative vs surgical tx options- risks and benefits explained.  We did discuss the possibility of isolated liner exchange.  Low I think she does have some underlying ligament laxity as her right knee is somewhat lax but not as significant as the left she is well informed and would like to proceed with aspiration on the LT knee- will send to lab. Also will start a course of PT to work on quad strengthening. Given Rx for tramadol. F/up 6 weeks  2/20/24: 2.5 years s/p R TKA by me. 1.5 years s/p L TKA by me- mainly with LT knee pain, occ RT knee pain but improving. Was unable to do PT last month due to personal issue but now she will begin a course of PT for b/l knees. Fluid aspirated from LT knee at last visit showing some crystals and pseudogout in the LT knee but I believe this is microinstability in the knee due to her somewhat lax ligaments. Follow up 6 weeks.

## 2024-02-20 NOTE — IMAGING
[de-identified] : LEFT KNEE ROM -2-90 mod effusion 5-10 mm opening laterally 5mm opening medially.  RIGHT KNEE no effusion ROM -2-100 3-5mm opening medially, 3-5mm opening laterally sensation intact +2 pt pulses  XR B/L KNEES showing implants fixed and in good position

## 2024-02-20 NOTE — DISCUSSION/SUMMARY
[de-identified] : The patient was advised of the diagnosis.  The natural history of the pathology was explained in full to the patient in layman's terms. All questions were answered.  The risks and benefits of surgical and non-surgical treatment alternatives were explained in full to the patient.  Entered by Neli Andrews acting as a scribe.

## 2024-02-20 NOTE — HISTORY OF PRESENT ILLNESS
[5] : 5 [4] : 4 [de-identified] : 2/20/24: Here to f/up b/l knees (L>R). Lt knee aspiration at last visit provided good relief for about 2 weeks- pain has returned. States she could not do PT due to personal issue- had to leave the country- but now would like to start PT. Taking tramadol as needed with good relief. Ambulates without assistance.   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 Right > left knee pain for several years, worsening. Injections in the past with mild improvement. Had zilretta both knees 1 month ago which helped a lot. Back fusion 2018 and feels knees are worse since that. Had right groin pain a few months ago which improved after hip inj. Pain with stairs. Gastric sleeve a few months ago - lost 42 lbs. Recent A1c 6.2. 9/27/21 she is ready for TKA this week but left knee is severe and zillretta worse 10/12/21: 2 wks s/p right knee TKA. Doing better than before sx. Struggling with pain at night. Denies fever/chills. PT reports pain meds use, but is trying to decreased. 12/13/21: Right knee min pain, stopped PT. left knee worsening pain. 2/14/22: Worsening left knee pain. 5/24/22: continued and worsening pain in b/l knees- Scheduled for TKA 6/8/22. Ziretta helped for one month 6/21/22 doing ok but feels this was more painful - no fever no chills 8/16/22: 10 weeks s/p L TKA doing okay with some pain. Has not been able to attend PT due to auth issues - Feels the recovery for the left knee is significantly worse 9/27/22: 3.5 months s/p L TKA doing significantly better with HEP. PT was denied. She does not feel the knee is still filled with fluid. 11/28/22: Still has swelling and stiffness in left knee.  No real change.  MDP helped shoulder for a short period then pain returned - radiates to hand with tingling at night.  Still feels she needs tramadol. 12/16/22: Here for MRI review. Had asp of the L knee at last visit. States L knee is better than last visit. C-spine pain has somewhat improved.  1/9/24: Here for b/l knees. 2.5 years s/p R TKA by me. 1.5 years s/p L TKA by me. LT knee pain x 3 months. No specific injury/trauma. RT knee is doing well. Most pain with sit to stand and walking. States LT knee occ adalberto. Taking motrin/tylenol with good relief but bothers her stomach.

## 2024-02-24 ENCOUNTER — OFFICE (OUTPATIENT)
Dept: URBAN - METROPOLITAN AREA CLINIC 90 | Facility: CLINIC | Age: 64
Setting detail: OPHTHALMOLOGY
End: 2024-02-24
Payer: COMMERCIAL

## 2024-02-24 DIAGNOSIS — H11.133: ICD-10-CM

## 2024-02-24 DIAGNOSIS — H18.513: ICD-10-CM

## 2024-02-24 DIAGNOSIS — H21.233: ICD-10-CM

## 2024-02-24 DIAGNOSIS — H25.13: ICD-10-CM

## 2024-02-24 DIAGNOSIS — H35.033: ICD-10-CM

## 2024-02-24 DIAGNOSIS — H40.013: ICD-10-CM

## 2024-02-24 PROCEDURE — 92250 FUNDUS PHOTOGRAPHY W/I&R: CPT | Performed by: OPHTHALMOLOGY

## 2024-02-24 PROCEDURE — 76514 ECHO EXAM OF EYE THICKNESS: CPT | Performed by: OPHTHALMOLOGY

## 2024-02-24 PROCEDURE — 92014 COMPRE OPH EXAM EST PT 1/>: CPT | Performed by: OPHTHALMOLOGY

## 2024-02-24 ASSESSMENT — REFRACTION_MANIFEST
OS_VA2: 20/25(J1)
OD_SPHERE: +3.00
OD_CYLINDER: -1.00
OD_VA2: 20/25(J1)
OS_AXIS: 100
OD_VA1: 20/20-1
OD_AXIS: 095
OS_ADD: +2.50
OS_SPHERE: +3.25
OD_ADD: +2.50
OS_CYLINDER: -1.50
OS_VA1: 20/25

## 2024-02-24 ASSESSMENT — CORNEAL DYSTROPHY - POSTERIOR
OS_POSTERIORDYSTROPHY: MOD GUTTATA
OD_POSTERIORDYSTROPHY: MOD GUTTATA

## 2024-02-24 ASSESSMENT — REFRACTION_CURRENTRX
OD_OVR_VA: 20/
OS_CYLINDER: SPHERE
OD_CYLINDER: SPHERE
OD_CYLINDER: 0.00
OS_VPRISM_DIRECTION: PROGS
OS_SPHERE: +2.25
OD_OVR_VA: 20/
OS_ADD: +2.50
OD_ADD: +2.25
OS_CYLINDER: 0.00
OD_SPHERE: +2.25
OD_SPHERE: +2.25
OD_AXIS: 180
OS_AXIS: 180
OS_OVR_VA: 20/
OS_OVR_VA: 20/
OS_ADD: +2.25
OD_ADD: +2.25
OD_VPRISM_DIRECTION: PROGS
OS_SPHERE: +2.00

## 2024-02-24 ASSESSMENT — REFRACTION_AUTOREFRACTION
OS_SPHERE: +3.75
OD_AXIS: 094
OD_SPHERE: +3.50
OD_CYLINDER: --1.25
OS_AXIS: 098
OS_CYLINDER: -1.50

## 2024-02-24 ASSESSMENT — CONFRONTATIONAL VISUAL FIELD TEST (CVF)
OS_FINDINGS: FULL
OD_FINDINGS: FULL

## 2024-02-24 ASSESSMENT — SPHEQUIV_DERIVED
OD_SPHEQUIV: 2.5
OS_SPHEQUIV: 3
OS_SPHEQUIV: 2.5

## 2024-03-12 ENCOUNTER — APPOINTMENT (OUTPATIENT)
Dept: ENDOCRINOLOGY | Facility: CLINIC | Age: 64
End: 2024-03-12
Payer: COMMERCIAL

## 2024-03-12 VITALS
SYSTOLIC BLOOD PRESSURE: 130 MMHG | TEMPERATURE: 97.9 F | HEART RATE: 86 BPM | WEIGHT: 228.13 LBS | HEIGHT: 66 IN | OXYGEN SATURATION: 99 % | BODY MASS INDEX: 36.66 KG/M2 | DIASTOLIC BLOOD PRESSURE: 80 MMHG

## 2024-03-12 DIAGNOSIS — E66.9 OBESITY, UNSPECIFIED: ICD-10-CM

## 2024-03-12 DIAGNOSIS — E78.5 HYPERLIPIDEMIA, UNSPECIFIED: ICD-10-CM

## 2024-03-12 DIAGNOSIS — I10 ESSENTIAL (PRIMARY) HYPERTENSION: ICD-10-CM

## 2024-03-12 DIAGNOSIS — E11.9 TYPE 2 DIABETES MELLITUS W/OUT COMPLICATIONS: ICD-10-CM

## 2024-03-12 LAB
GLUCOSE BLDC GLUCOMTR-MCNC: 102
HBA1C MFR BLD HPLC: 6.4

## 2024-03-12 PROCEDURE — 82962 GLUCOSE BLOOD TEST: CPT

## 2024-03-12 PROCEDURE — 83036 HEMOGLOBIN GLYCOSYLATED A1C: CPT | Mod: QW

## 2024-03-12 PROCEDURE — 99214 OFFICE O/P EST MOD 30 MIN: CPT

## 2024-03-12 NOTE — PHYSICAL EXAM
[Alert] : alert [No Acute Distress] : no acute distress [Well Nourished] : well nourished [Well Developed] : well developed [Normal Sclera/Conjunctiva] : normal sclera/conjunctiva [EOMI] : extra ocular movement intact [No Proptosis] : no proptosis [Normal Oropharynx] : the oropharynx was normal [Thyroid Not Enlarged] : the thyroid was not enlarged [No Thyroid Nodules] : no palpable thyroid nodules [No Respiratory Distress] : no respiratory distress [No Accessory Muscle Use] : no accessory muscle use [Normal S1, S2] : normal S1 and S2 [Clear to Auscultation] : lungs were clear to auscultation bilaterally [Normal Rate] : heart rate was normal [Regular Rhythm] : with a regular rhythm [No Edema] : no peripheral edema [Pedal Pulses Normal] : the pedal pulses are present [Normal Bowel Sounds] : normal bowel sounds [Not Tender] : non-tender [Not Distended] : not distended [Soft] : abdomen soft [Normal Anterior Cervical Nodes] : no anterior cervical lymphadenopathy [No Spinal Tenderness] : no spinal tenderness [Normal Posterior Cervical Nodes] : no posterior cervical lymphadenopathy [Spine Straight] : spine straight [No Stigmata of Cushings Syndrome] : no stigmata of Cushings Syndrome [Normal Gait] : normal gait [Normal Strength/Tone] : muscle strength and tone were normal [No Rash] : no rash [Normal Reflexes] : deep tendon reflexes were 2+ and symmetric [No Tremors] : no tremors [Oriented x3] : oriented to person, place, and time [Acanthosis Nigricans] : no acanthosis nigricans

## 2024-03-23 LAB
25(OH)D3 SERPL-MCNC: 56.1 NG/ML
ALBUMIN SERPL ELPH-MCNC: 4.2 G/DL
ALBUMIN SERPL ELPH-MCNC: 4.5 G/DL
ALP BLD-CCNC: 115 U/L
ALP BLD-CCNC: 123 U/L
ALP BLD-CCNC: 126 U/L
ALT SERPL-CCNC: 18 U/L
ALT SERPL-CCNC: 22 U/L
ANION GAP SERPL CALC-SCNC: 10 MMOL/L
ANION GAP SERPL CALC-SCNC: 15 MMOL/L
APPEARANCE: CLEAR
AST SERPL-CCNC: 22 U/L
AST SERPL-CCNC: 28 U/L
BACTERIA UR CULT: NORMAL
BACTERIA: NEGATIVE
BACTERIA: NEGATIVE
BACTERIA: NEGATIVE /HPF
BASOPHILS # BLD AUTO: 0.04 K/UL
BASOPHILS NFR BLD AUTO: 0.6 %
BASOPHILS NFR BLD AUTO: 0.8 %
BASOPHILS NFR BLD AUTO: 0.8 %
BILIRUB SERPL-MCNC: 0.4 MG/DL
BILIRUB SERPL-MCNC: 0.5 MG/DL
BILIRUBIN URINE: NEGATIVE
BLOOD URINE: NEGATIVE
BUN SERPL-MCNC: 10 MG/DL
BUN SERPL-MCNC: 11 MG/DL
CALCIUM SERPL-MCNC: 9.7 MG/DL
CALCIUM SERPL-MCNC: 9.7 MG/DL
CAST: 0 /LPF
CHLORIDE SERPL-SCNC: 103 MMOL/L
CHLORIDE SERPL-SCNC: 103 MMOL/L
CHOLEST SERPL-MCNC: 207 MG/DL
CHOLEST SERPL-MCNC: 218 MG/DL
CO2 SERPL-SCNC: 22 MMOL/L
CO2 SERPL-SCNC: 30 MMOL/L
COLOR: ABNORMAL
COLOR: YELLOW
COLOR: YELLOW
CREAT SERPL-MCNC: 0.87 MG/DL
CREAT SERPL-MCNC: 0.88 MG/DL
CREAT SPEC-SCNC: 59 MG/DL
EGFR: 75 ML/MIN/1.73M2
EGFR: 76 ML/MIN/1.73M2
EOSINOPHIL # BLD AUTO: 0.17 K/UL
EOSINOPHIL # BLD AUTO: 0.22 K/UL
EOSINOPHIL # BLD AUTO: 0.22 K/UL
EOSINOPHIL NFR BLD AUTO: 2.5 %
EOSINOPHIL NFR BLD AUTO: 4.3 %
EOSINOPHIL NFR BLD AUTO: 4.6 %
EPITHELIAL CELLS: 3 /HPF
ESTIMATED AVERAGE GLUCOSE: 131 MG/DL
ESTIMATED AVERAGE GLUCOSE: 131 MG/DL
FERRITIN SERPL-MCNC: 186 NG/ML
FOLATE SERPL-MCNC: >20 NG/ML
GGT SERPL-CCNC: 25 U/L
GLUCOSE QUALITATIVE U: NEGATIVE
GLUCOSE QUALITATIVE U: NEGATIVE
GLUCOSE QUALITATIVE U: NEGATIVE MG/DL
GLUCOSE SERPL-MCNC: 94 MG/DL
GLUCOSE SERPL-MCNC: 98 MG/DL
HAPTOGLOB SERPL-MCNC: 234 MG/DL
HBA1C MFR BLD HPLC: 6.2 %
HBA1C MFR BLD HPLC: 6.2 %
HCT VFR BLD CALC: 38.3 %
HCT VFR BLD CALC: 39.7 %
HCT VFR BLD CALC: 40.1 %
HDLC SERPL-MCNC: 77 MG/DL
HDLC SERPL-MCNC: 78 MG/DL
HGB BLD-MCNC: 12.1 G/DL
HGB BLD-MCNC: 12.5 G/DL
HGB BLD-MCNC: 12.8 G/DL
HYALINE CASTS: 1 /LPF
HYALINE CASTS: 1 /LPF
IMM GRANULOCYTES NFR BLD AUTO: 0 %
IMM GRANULOCYTES NFR BLD AUTO: 0.2 %
IMM GRANULOCYTES NFR BLD AUTO: 0.3 %
IRON SERPL-MCNC: 57 UG/DL
KETONES URINE: NEGATIVE
KETONES URINE: NEGATIVE
KETONES URINE: NEGATIVE MG/DL
LDH SERPL-CCNC: 218 U/L
LDLC SERPL CALC-MCNC: 109 MG/DL
LDLC SERPL CALC-MCNC: 127 MG/DL
LEUKOCYTE ESTERASE URINE: NEGATIVE
LYMPHOCYTES # BLD AUTO: 2.23 K/UL
LYMPHOCYTES # BLD AUTO: 2.34 K/UL
LYMPHOCYTES # BLD AUTO: 2.7 K/UL
LYMPHOCYTES NFR BLD AUTO: 40.4 %
LYMPHOCYTES NFR BLD AUTO: 45.4 %
LYMPHOCYTES NFR BLD AUTO: 46.4 %
M TB IFN-G BLD-IMP: NEGATIVE
MAN DIFF?: NORMAL
MCHC RBC-ENTMCNC: 26.4 PG
MCHC RBC-ENTMCNC: 26.8 PG
MCHC RBC-ENTMCNC: 26.8 PG
MCHC RBC-ENTMCNC: 31.5 GM/DL
MCHC RBC-ENTMCNC: 31.6 GM/DL
MCHC RBC-ENTMCNC: 31.9 GM/DL
MCV RBC AUTO: 83.8 FL
MCV RBC AUTO: 83.9 FL
MCV RBC AUTO: 84.9 FL
MICROALBUMIN 24H UR DL<=1MG/L-MCNC: <1.2 MG/DL
MICROALBUMIN/CREAT 24H UR-RTO: NORMAL MG/G
MICROSCOPIC-UA: NORMAL
MONOCYTES # BLD AUTO: 0.43 K/UL
MONOCYTES # BLD AUTO: 0.47 K/UL
MONOCYTES # BLD AUTO: 0.56 K/UL
MONOCYTES NFR BLD AUTO: 8.3 %
MONOCYTES NFR BLD AUTO: 8.4 %
MONOCYTES NFR BLD AUTO: 9.8 %
NEUTROPHILS # BLD AUTO: 1.84 K/UL
NEUTROPHILS # BLD AUTO: 2.12 K/UL
NEUTROPHILS # BLD AUTO: 3.2 K/UL
NEUTROPHILS NFR BLD AUTO: 38.2 %
NEUTROPHILS NFR BLD AUTO: 41.2 %
NEUTROPHILS NFR BLD AUTO: 47.8 %
NITRITE URINE: NEGATIVE
NONHDLC SERPL-MCNC: 130 MG/DL
NONHDLC SERPL-MCNC: 140 MG/DL
PH URINE: 6
PH URINE: 7
PH URINE: 7
PLATELET # BLD AUTO: 221 K/UL
PLATELET # BLD AUTO: 238 K/UL
PLATELET # BLD AUTO: 264 K/UL
POTASSIUM SERPL-SCNC: 4.5 MMOL/L
POTASSIUM SERPL-SCNC: 4.6 MMOL/L
PROT SERPL-MCNC: 6.8 G/DL
PROT SERPL-MCNC: 7 G/DL
PROTEIN URINE: NEGATIVE
PROTEIN URINE: NEGATIVE MG/DL
PROTEIN URINE: NORMAL
QUANTIFERON TB PLUS MITOGEN MINUS NIL: >10 IU/ML
QUANTIFERON TB PLUS NIL: 0.06 IU/ML
QUANTIFERON TB PLUS TB1 MINUS NIL: 0 IU/ML
QUANTIFERON TB PLUS TB2 MINUS NIL: 0 IU/ML
RAPID RVP RESULT: NOT DETECTED
RBC # BLD: 4.51 M/UL
RBC # BLD: 4.74 M/UL
RBC # BLD: 4.78 M/UL
RBC # FLD: 13.8 %
RBC # FLD: 14.1 %
RBC # FLD: 14.5 %
RED BLOOD CELLS URINE: 0 /HPF
RED BLOOD CELLS URINE: 0 /HPF
RED BLOOD CELLS URINE: 1 /HPF
SARS-COV-2 RNA PNL RESP NAA+PROBE: NOT DETECTED
SODIUM SERPL-SCNC: 141 MMOL/L
SODIUM SERPL-SCNC: 143 MMOL/L
SPECIFIC GRAVITY URINE: 1.01
SPECIFIC GRAVITY URINE: 1.02
SPECIFIC GRAVITY URINE: 1.02
SQUAMOUS EPITHELIAL CELLS: 0 /HPF
SQUAMOUS EPITHELIAL CELLS: 5 /HPF
T4 FREE SERPL-MCNC: 1.3 NG/DL
T4 FREE SERPL-MCNC: 1.4 NG/DL
TRANSFERRIN SERPL-MCNC: 216 MG/DL
TRIGL SERPL-MCNC: 103 MG/DL
TRIGL SERPL-MCNC: 72 MG/DL
TSH SERPL-ACNC: 1.07 UIU/ML
TSH SERPL-ACNC: 1.35 UIU/ML
UROBILINOGEN URINE: 0.2 MG/DL
UROBILINOGEN URINE: NORMAL
UROBILINOGEN URINE: NORMAL
VIT B12 SERPL-MCNC: >2000 PG/ML
WBC # FLD AUTO: 4.81 K/UL
WBC # FLD AUTO: 5.15 K/UL
WBC # FLD AUTO: 6.69 K/UL
WHITE BLOOD CELLS URINE: 0 /HPF
WHITE BLOOD CELLS URINE: 0 /HPF
WHITE BLOOD CELLS URINE: 1 /HPF

## 2024-04-02 ENCOUNTER — RX RENEWAL (OUTPATIENT)
Age: 64
End: 2024-04-02

## 2024-04-09 ENCOUNTER — APPOINTMENT (OUTPATIENT)
Dept: ORTHOPEDIC SURGERY | Facility: CLINIC | Age: 64
End: 2024-04-09
Payer: COMMERCIAL

## 2024-04-09 VITALS — HEIGHT: 66 IN | BODY MASS INDEX: 36.64 KG/M2 | WEIGHT: 228 LBS

## 2024-04-09 PROCEDURE — 99214 OFFICE O/P EST MOD 30 MIN: CPT

## 2024-04-09 RX ORDER — MELOXICAM 15 MG/1
15 TABLET ORAL
Qty: 30 | Refills: 1 | Status: ACTIVE | COMMUNITY
Start: 2024-04-09 | End: 1900-01-01

## 2024-04-09 NOTE — HISTORY OF PRESENT ILLNESS
[6] : 6 [de-identified] : 4/9/24: f/up bilateral knees. PT has been somewhat beneficial. 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 Right > left knee pain for several years, worsening. Injections in the past with mild improvement. Had zilretta both knees 1 month ago which helped a lot. Back fusion 2018 and feels knees are worse since that. Had right groin pain a few months ago which improved after hip inj. Pain with stairs. Gastric sleeve a few months ago - lost 42 lbs. Recent A1c 6.2. 9/27/21 she is ready for TKA this week but left knee is severe and zillretta worse 10/12/21: 2 wks s/p right knee TKA. Doing better than before sx. Struggling with pain at night. Denies fever/chills. PT reports pain meds use, but is trying to decreased. 12/13/21: Right knee min pain, stopped PT. left knee worsening pain. 2/14/22: Worsening left knee pain. 5/24/22: continued and worsening pain in b/l knees- Scheduled for TKA 6/8/22. Ziretta helped for one month 6/21/22 doing ok but feels this was more painful - no fever no chills 8/16/22: 10 weeks s/p L TKA doing okay with some pain. Has not been able to attend PT due to auth issues - Feels the recovery for the left knee is significantly worse 9/27/22: 3.5 months s/p L TKA doing significantly better with HEP. PT was denied. She does not feel the knee is still filled with fluid. 11/28/22: Still has swelling and stiffness in left knee.  No real change.  MDP helped shoulder for a short period then pain returned - radiates to hand with tingling at night.  Still feels she needs tramadol. 12/16/22: Here for MRI review. Had asp of the L knee at last visit. States L knee is better than last visit. C-spine pain has somewhat improved.  1/9/24: Here for b/l knees. 2.5 years s/p R TKA by me. 1.5 years s/p L TKA by me. LT knee pain x 3 months. No specific injury/trauma. RT knee is doing well. Most pain with sit to stand and walking. States LT knee occ adalberto. Taking motrin/tylenol with good relief but bothers her stomach.  2/20/24: Here to f/up b/l knees (L>R). Lt knee aspiration at last visit provided good relief for about 2 weeks- pain has returned. States she could not do PT due to personal issue- had to leave the country- but now would like to start PT. Taking tramadol as needed with good relief. Ambulates without assistance.

## 2024-04-09 NOTE — IMAGING
[de-identified] : LEFT KNEE ROM -2-90 mod effusion 5-10 mm opening laterally 5mm opening medially.  RIGHT KNEE no effusion ROM -2-100 3-5mm opening medially, 3-5mm opening laterally sensation intact +2 pt pulses  XR B/L KNEES showing implants fixed and in good position

## 2024-04-09 NOTE — ASSESSMENT
[FreeTextEntry1] : Severe OA both knees. Discussed options - failed injections, NSAIDs and PT in the past and has pain with ADLs - ROM right knee only to about 80, left to about 100. Will plan for right TKA. Last A1c 6.2, did well with weight loss after gastric sleeve. No h/O DVT. Concern for poor post op response due to ongoing poor ROM and mechanics. Advised patient that post op, would have greater difficulty in recovery & PT. Discussed procedure, recovery process, risks and benefits of TKA. 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 Discussed pain mgmt expectations. 9/27/21 - she is planning TKA SAILAJA this week rt knee - left knee is severe and would like injection today 10/12/21: 2 wks s/p Right TKA. Doing well. PT reports trouble sleeping/heaviness feeling in the knee. this is normal Xrays well fixed. Discussed Post operative protocol. No bathing submerging 2-3 wks. Discussed importance of continued elevation. Renewed oxycodone. Continue HEP and PT 11/8 rt knee doing well happy ROM is 100 started at 80 -- Left knee is worse had recent Inj - we will try duexis that helped her in the past and if cont pain may repeat zilretta - she may need TKA on the left at some pain we discussed this today 12/13/21: Right TKA doing very well, cont HEP. Left knee worsening - inj today tolerated well, needs auth for zilretta and will return for this as soon as allowed. Considering TKA in the spring. 2/14/22: Left knee zilretta inj today tolerated well - plan for left TKA with sailaja after 5/14/22. Need CT to eval for bone loss. 5/24/22: left knee pain getting worse. Scheduled for L TKA in June. Risks and benefits discussed and all questions answered. recently had R TKA in September with no issues 6/21/22: 2 weeks postop L TKA still having a lot of pain. Switch pain meds to dilaudid, start PT and fu in 4 weeks 8/16/22: 2 months postop L TKA doing okay with a some pain on the lateral side - She recently went on vacation and had an increase in pain likely due to aggregation of knee - Renewed tramadol and follow up in 6 weeks - Right knee doing well. Back to work 8.29 - does have effusion - hold on aspiration may do it next visit 9/27/22: Knee slowly improving, only mild effusion but I don't feel need for asp at this point and her ROM is similar to other side. Recent left shoulder pain, numbness and tingling - Recommend cyclobenzaprine and MDP. If continues, will do a further workup. 11/28/22: Cont left knee pain and effusion, no XR change today, looks well fixed. ROM equal both knees. Asp today and reeval in 4 weeks. Cont pain and numbness in left arm - MRI Cspine and have eval by spine specialist. 12/16/22: Left knee is doing much better after aspiration. No signs of infection. 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 1/9/24: Pt is 2.5 years s/p R TKA by me. 1.5 years s/p L TKA by me- some laxity on exam. Discussed conservative vs surgical tx options- risks and benefits explained.  We did discuss the possibility of isolated liner exchange.  Low I think she does have some underlying ligament laxity as her right knee is somewhat lax but not as significant as the left she is well informed and would like to proceed with aspiration on the LT knee- will send to lab. Also will start a course of PT to work on quad strengthening. Given Rx for tramadol. F/up 6 weeks 2/20/24: 2.5 years s/p R TKA by me. 1.5 years s/p L TKA by me- mainly with LT knee pain, occ RT knee pain but improving. Was unable to do PT last month due to personal issue but now she will begin a course of PT for b/l knees. Fluid aspirated from LT knee at last visit showing some crystals and pseudogout in the LT knee but I believe this is microinstability in the knee due to her somewhat lax ligaments. Follow up 6 weeks.   4/9/24: Discussed repeat aspirations and increased risk of infection. Will have her continue with PT. Her pain is intermittent, but most bothersome with activity. She also has some right hip/groin pain, likely has mild hip OA. Meloxicam rx. f/up 6 weeks.

## 2024-04-09 NOTE — DISCUSSION/SUMMARY
[de-identified] : The patient was advised of the diagnosis.  The natural history of the pathology was explained in full to the patient in layman's terms. All questions were answered.  The risks and benefits of surgical and non-surgical treatment alternatives were explained in full to the patient.  Entered by Neli Andrews acting as a scribe.
04-Jun-2023 16:02

## 2024-04-20 LAB
B PERT IGG+IGM PNL SER: ABNORMAL
COLOR FLD: NORMAL
EOSINOPHIL # FLD MANUAL: 0 %
FLUID INTAKE SUBSTANCE CLASS: NORMAL
JOINT CULTURE: NORMAL
LYMPHOCYTES # FLD MANUAL: 48 %
MESOTHL CELL NFR FLD: 0 %
MONOS+MACROS NFR FLD MANUAL: 34 %
NEUTS SEG # FLD MANUAL: 18 %
NRBC # FLD: 0 %
RBC # FLD MANUAL: 2000 /UL
SYCRY CLARITY: ABNORMAL
SYCRY COLOR: ABNORMAL
SYCRY ID: ABNORMAL
SYCRY TUBE: NORMAL
TOTAL CELLS COUNTED FLD: 380 /UL
TUBE TYPE: NORMAL
UNIDENT CELLS NFR FLD MANUAL: 0 %
VARIANT LYMPHS # FLD MANUAL: 0 %

## 2024-05-21 ENCOUNTER — APPOINTMENT (OUTPATIENT)
Dept: ORTHOPEDIC SURGERY | Facility: CLINIC | Age: 64
End: 2024-05-21
Payer: COMMERCIAL

## 2024-05-21 DIAGNOSIS — Z96.651 PRESENCE OF RIGHT ARTIFICIAL KNEE JOINT: ICD-10-CM

## 2024-05-21 DIAGNOSIS — Z96.652 PRESENCE OF LEFT ARTIFICIAL KNEE JOINT: ICD-10-CM

## 2024-05-21 PROCEDURE — 99213 OFFICE O/P EST LOW 20 MIN: CPT

## 2024-05-21 NOTE — ASSESSMENT
[FreeTextEntry1] : Severe OA both knees. Discussed options - failed injections, NSAIDs and PT in the past and has pain with ADLs - ROM right knee only to about 80, left to about 100. Will plan for right TKA. Last A1c 6.2, did well with weight loss after gastric sleeve. No h/O DVT. Concern for poor post op response due to ongoing poor ROM and mechanics. Advised patient that post op, would have greater difficulty in recovery & PT. Discussed procedure, recovery process, risks and benefits of TKA. 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 Discussed pain mgmt expectations. 9/27/21 - she is planning TKA SAILAJA this week rt knee - left knee is severe and would like injection today 10/12/21: 2 wks s/p Right TKA. Doing well. PT reports trouble sleeping/heaviness feeling in the knee. this is normal Xrays well fixed. Discussed Post operative protocol. No bathing submerging 2-3 wks. Discussed importance of continued elevation. Renewed oxycodone. Continue HEP and PT 11/8 rt knee doing well happy ROM is 100 started at 80 -- Left knee is worse had recent Inj - we will try duexis that helped her in the past and if cont pain may repeat zilretta - she may need TKA on the left at some pain we discussed this today 12/13/21: Right TKA doing very well, cont HEP. Left knee worsening - inj today tolerated well, needs auth for zilretta and will return for this as soon as allowed. Considering TKA in the spring. 2/14/22: Left knee zilretta inj today tolerated well - plan for left TKA with sailaja after 5/14/22. Need CT to eval for bone loss. 5/24/22: left knee pain getting worse. Scheduled for L TKA in June. Risks and benefits discussed and all questions answered. recently had R TKA in September with no issues 6/21/22: 2 weeks postop L TKA still having a lot of pain. Switch pain meds to dilaudid, start PT and fu in 4 weeks 8/16/22: 2 months postop L TKA doing okay with a some pain on the lateral side - She recently went on vacation and had an increase in pain likely due to aggregation of knee - Renewed tramadol and follow up in 6 weeks - Right knee doing well. Back to work 8.29 - does have effusion - hold on aspiration may do it next visit 9/27/22: Knee slowly improving, only mild effusion but I don't feel need for asp at this point and her ROM is similar to other side. Recent left shoulder pain, numbness and tingling - Recommend cyclobenzaprine and MDP. If continues, will do a further workup. 11/28/22: Cont left knee pain and effusion, no XR change today, looks well fixed. ROM equal both knees. Asp today and reeval in 4 weeks. Cont pain and numbness in left arm - MRI Cspine and have eval by spine specialist. 12/16/22: Left knee is doing much better after aspiration. No signs of infection. 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 1/9/24: Pt is 2.5 years s/p R TKA by me. 1.5 years s/p L TKA by me- some laxity on exam. Discussed conservative vs surgical tx options- risks and benefits explained.  We did discuss the possibility of isolated liner exchange.  Low I think she does have some underlying ligament laxity as her right knee is somewhat lax but not as significant as the left she is well informed and would like to proceed with aspiration on the LT knee- will send to lab. Also will start a course of PT to work on quad strengthening. Given Rx for tramadol. F/up 6 weeks 2/20/24: 2.5 years s/p R TKA by me. 1.5 years s/p L TKA by me- mainly with LT knee pain, occ RT knee pain but improving. Was unable to do PT last month due to personal issue but now she will begin a course of PT for b/l knees. Fluid aspirated from LT knee at last visit showing some crystals and pseudogout in the LT knee but I believe this is microinstability in the knee due to her somewhat lax ligaments. Follow up 6 weeks.  4/9/24: Discussed repeat aspirations and increased risk of infection. Will have her continue with PT. Her pain is intermittent, but most bothersome with activity. She also has some right hip/groin pain, likely has mild hip OA. Meloxicam rx. f/up 6 weeks.   5/21/24: Doing well. Sig improvement with PT. No more effusions. Follow up as needed

## 2024-05-21 NOTE — HISTORY OF PRESENT ILLNESS
[Dull/Aching] : dull/aching [de-identified] : 5/21/24: Patient here for follow up of B/L knees. Doing well. Sig improved with PT.  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 Right > left knee pain for several years, worsening. Injections in the past with mild improvement. Had zilretta both knees 1 month ago which helped a lot. Back fusion 2018 and feels knees are worse since that. Had right groin pain a few months ago which improved after hip inj. Pain with stairs. Gastric sleeve a few months ago - lost 42 lbs. Recent A1c 6.2. 9/27/21 she is ready for TKA this week but left knee is severe and zillretta worse 10/12/21: 2 wks s/p right knee TKA. Doing better than before sx. Struggling with pain at night. Denies fever/chills. PT reports pain meds use, but is trying to decreased. 12/13/21: Right knee min pain, stopped PT. left knee worsening pain. 2/14/22: Worsening left knee pain. 5/24/22: continued and worsening pain in b/l knees- Scheduled for TKA 6/8/22. Ziretta helped for one month 6/21/22 doing ok but feels this was more painful - no fever no chills 8/16/22: 10 weeks s/p L TKA doing okay with some pain. Has not been able to attend PT due to auth issues - Feels the recovery for the left knee is significantly worse 9/27/22: 3.5 months s/p L TKA doing significantly better with HEP. PT was denied. She does not feel the knee is still filled with fluid. 11/28/22: Still has swelling and stiffness in left knee.  No real change.  MDP helped shoulder for a short period then pain returned - radiates to hand with tingling at night.  Still feels she needs tramadol. 12/16/22: Here for MRI review. Had asp of the L knee at last visit. States L knee is better than last visit. C-spine pain has somewhat improved.  1/9/24: Here for b/l knees. 2.5 years s/p R TKA by me. 1.5 years s/p L TKA by me. LT knee pain x 3 months. No specific injury/trauma. RT knee is doing well. Most pain with sit to stand and walking. States LT knee occ adalberto. Taking motrin/tylenol with good relief but bothers her stomach.  2/20/24: Here to f/up b/l knees (L>R). Lt knee aspiration at last visit provided good relief for about 2 weeks- pain has returned. States she could not do PT due to personal issue- had to leave the country- but now would like to start PT. Taking tramadol as needed with good relief. Ambulates without assistance.  4/9/24: f/up bilateral knees. PT has been somewhat beneficial. She also notes right thigh pain [FreeTextEntry5] : pain is better  [de-identified] : PT

## 2024-05-21 NOTE — IMAGING
[de-identified] : LEFT KNEE ROM -2-90 mod effusion 5-10 mm opening laterally 5mm opening medially.  RIGHT KNEE no effusion ROM -2-100 3-5mm opening medially, 3-5mm opening laterally sensation intact +2 pt pulses  XR B/L KNEES showing implants fixed and in good position

## 2024-05-21 NOTE — DISCUSSION/SUMMARY
[de-identified] : The patient was advised of the diagnosis.  The natural history of the pathology was explained in full to the patient in layman's terms. All questions were answered.  The risks and benefits of surgical and non-surgical treatment alternatives were explained in full to the patient.  Entered by Neli Andrews acting as a scribe.

## 2024-05-31 RX ORDER — TIRZEPATIDE 5 MG/.5ML
5 INJECTION, SOLUTION SUBCUTANEOUS WEEKLY
Qty: 3 | Refills: 0 | Status: ACTIVE | COMMUNITY
Start: 2023-11-13 | End: 1900-01-01

## 2024-07-09 ENCOUNTER — APPOINTMENT (OUTPATIENT)
Dept: CARDIOLOGY | Facility: CLINIC | Age: 64
End: 2024-07-09
Payer: COMMERCIAL

## 2024-07-09 ENCOUNTER — APPOINTMENT (OUTPATIENT)
Dept: ENDOCRINOLOGY | Facility: CLINIC | Age: 64
End: 2024-07-09
Payer: COMMERCIAL

## 2024-07-09 VITALS
TEMPERATURE: 98.4 F | WEIGHT: 232 LBS | HEIGHT: 66 IN | SYSTOLIC BLOOD PRESSURE: 130 MMHG | OXYGEN SATURATION: 98 % | DIASTOLIC BLOOD PRESSURE: 70 MMHG | BODY MASS INDEX: 37.28 KG/M2 | HEART RATE: 61 BPM

## 2024-07-09 DIAGNOSIS — M17.11 UNILATERAL PRIMARY OSTEOARTHRITIS, RIGHT KNEE: ICD-10-CM

## 2024-07-09 DIAGNOSIS — R94.31 ABNORMAL ELECTROCARDIOGRAM [ECG] [EKG]: ICD-10-CM

## 2024-07-09 DIAGNOSIS — E78.5 HYPERLIPIDEMIA, UNSPECIFIED: ICD-10-CM

## 2024-07-09 DIAGNOSIS — M17.12 UNILATERAL PRIMARY OSTEOARTHRITIS, LEFT KNEE: ICD-10-CM

## 2024-07-09 DIAGNOSIS — L29.9 PRURITUS, UNSPECIFIED: ICD-10-CM

## 2024-07-09 DIAGNOSIS — Z13.228 ENCOUNTER FOR SCREENING FOR OTHER METABOLIC DISORDERS: ICD-10-CM

## 2024-07-09 DIAGNOSIS — E11.9 TYPE 2 DIABETES MELLITUS W/OUT COMPLICATIONS: ICD-10-CM

## 2024-07-09 DIAGNOSIS — E66.9 OBESITY, UNSPECIFIED: ICD-10-CM

## 2024-07-09 DIAGNOSIS — I10 ESSENTIAL (PRIMARY) HYPERTENSION: ICD-10-CM

## 2024-07-09 DIAGNOSIS — E66.01 MORBID (SEVERE) OBESITY DUE TO EXCESS CALORIES: ICD-10-CM

## 2024-07-09 DIAGNOSIS — Z12.11 ENCOUNTER FOR SCREENING FOR MALIGNANT NEOPLASM OF COLON: ICD-10-CM

## 2024-07-09 LAB
GLUCOSE BLDC GLUCOMTR-MCNC: 80
HBA1C MFR BLD HPLC: 5.9

## 2024-07-09 PROCEDURE — 83036 HEMOGLOBIN GLYCOSYLATED A1C: CPT | Mod: QW

## 2024-07-09 PROCEDURE — 76536 US EXAM OF HEAD AND NECK: CPT

## 2024-07-09 PROCEDURE — G2211 COMPLEX E/M VISIT ADD ON: CPT | Mod: NC

## 2024-07-09 PROCEDURE — 99214 OFFICE O/P EST MOD 30 MIN: CPT | Mod: 25

## 2024-07-09 PROCEDURE — 93000 ELECTROCARDIOGRAM COMPLETE: CPT

## 2024-07-09 PROCEDURE — 82962 GLUCOSE BLOOD TEST: CPT

## 2024-07-09 PROCEDURE — 99214 OFFICE O/P EST MOD 30 MIN: CPT

## 2024-07-09 RX ORDER — CLOTRIMAZOLE AND BETAMETHASONE DIPROPIONATE 10; .5 MG/G; MG/G
1-0.05 CREAM TOPICAL TWICE DAILY
Qty: 1 | Refills: 3 | Status: ACTIVE | COMMUNITY
Start: 2024-07-09 | End: 1900-01-01

## 2024-07-11 DIAGNOSIS — E78.00 PURE HYPERCHOLESTEROLEMIA, UNSPECIFIED: ICD-10-CM

## 2024-07-11 LAB
25(OH)D3 SERPL-MCNC: 40 NG/ML
ALP BLD-CCNC: 95 U/L
ALT SERPL-CCNC: 15 U/L
ANION GAP SERPL CALC-SCNC: 16 MMOL/L
APO B SERPL-MCNC: 104 MG/DL
APPEARANCE: CLEAR
AST SERPL-CCNC: 22 U/L
BACTERIA: NEGATIVE /HPF
BASOPHILS # BLD AUTO: 0.06 K/UL
BASOPHILS NFR BLD AUTO: 1.1 %
BILIRUB DIRECT SERPL-MCNC: 0.1 MG/DL
BILIRUB INDIRECT SERPL-MCNC: 0.4 MG/DL
BILIRUB SERPL-MCNC: 0.5 MG/DL
BILIRUBIN URINE: NEGATIVE
BLOOD URINE: NEGATIVE
BUN SERPL-MCNC: 11 MG/DL
CALCIUM SERPL-MCNC: 9.3 MG/DL
CAST: 0 /LPF
CHLORIDE SERPL-SCNC: 102 MMOL/L
CHOLEST SERPL-MCNC: 225 MG/DL
CO2 SERPL-SCNC: 25 MMOL/L
CREAT SERPL-MCNC: 0.86 MG/DL
CREAT SPEC-SCNC: 72 MG/DL
EGFR: 77 ML/MIN/1.73M2
EOSINOPHIL # BLD AUTO: 0.28 K/UL
EOSINOPHIL NFR BLD AUTO: 5 %
EPITHELIAL CELLS: 4 /HPF
ESTIMATED AVERAGE GLUCOSE: 126 MG/DL
FOLATE SERPL-MCNC: >20 NG/ML
FRUCTOSAMINE SERPL-MCNC: 235 UMOL/L
GLUCOSE QUALITATIVE U: NEGATIVE MG/DL
GLYCOMARK.: 29.7 UG/ML
HBA1C MFR BLD HPLC: 6 %
HDLC SERPL-MCNC: 78 MG/DL
HGB BLD-MCNC: 12.9 G/DL
IMM GRANULOCYTES NFR BLD AUTO: 0 %
KETONES URINE: NEGATIVE MG/DL
LDLC SERPL CALC-MCNC: 132 MG/DL
LDLC SERPL DIRECT ASSAY-MCNC: 127 MG/DL
LEUKOCYTE ESTERASE URINE: NEGATIVE
LYMPHOCYTES # BLD AUTO: 2.62 K/UL
LYMPHOCYTES NFR BLD AUTO: 47 %
MCHC RBC-ENTMCNC: 26.8 PG
MCHC RBC-ENTMCNC: 30 GM/DL
MICROSCOPIC-UA: NORMAL
MONOCYTES # BLD AUTO: 0.42 K/UL
MONOCYTES NFR BLD AUTO: 7.5 %
NEUTROPHILS # BLD AUTO: 2.2 K/UL
NEUTROPHILS NFR BLD AUTO: 39.4 %
NITRITE URINE: NEGATIVE
NONHDLC SERPL-MCNC: 147 MG/DL
PH URINE: 6.5
PLATELET # BLD AUTO: 221 K/UL
PROT SERPL-MCNC: 7.1 G/DL
PROTEIN URINE: NORMAL MG/DL
RBC # BLD: 4.81 M/UL
RBC # FLD: 15.3 %
RED BLOOD CELLS URINE: 1 /HPF
SODIUM SERPL-SCNC: 142 MMOL/L
SPECIFIC GRAVITY URINE: 1.01
T3FREE SERPL-MCNC: 2.22 PG/ML
T4 FREE SERPL-MCNC: 1.3 NG/DL
TRIGL SERPL-MCNC: 83 MG/DL
TSH SERPL-ACNC: 1.41 UIU/ML
UROBILINOGEN URINE: 0.2 MG/DL
WBC # FLD AUTO: 5.58 K/UL
WHITE BLOOD CELLS URINE: 0 /HPF

## 2024-07-11 RX ORDER — ROSUVASTATIN CALCIUM 5 MG/1
5 TABLET, FILM COATED ORAL DAILY
Qty: 90 | Refills: 1 | Status: ACTIVE | COMMUNITY
Start: 2024-07-11 | End: 1900-01-01

## 2024-09-17 NOTE — HISTORY OF PRESENT ILLNESS
Patient admitted for hypo-osmolar hyponatremia
[FreeTextEntry1] : Ms. MILADIS FRANCIS is a 63-year-old female who presents for initial endocrine evaluation with regard to a hx of type 2 dm. The diabetes has been present for about three years.  Patient had gastric sleeve about 3 years ago lost about 50 lbs, gained 20 lbs back.   For the diabetes, she was taking Mounjaro 2.5 mg weekly. But was moved up to 5 mg last week per pmd Dr. Heather Almaraz. No adverse effects but no benefits. On the one 5 mg dose -some less craving and less hunger.   She denies polyuria, polydipsia, or any visual changes. She too denies any skin lesions, skin breakdown or non-healing areas of skin. She too denies any podiatric concerns. Ophthalmologic evaluation is up to date. She denies any history of retinopathy or nephropathy.  Patient endorses neuropathy in both hands and feet, mostly in the hands.  Patient also reports hx of arthritis.   Patient states she has glucose meter at home, but rarely carries out home blood glucose monitoring. Bg's am 90's not testing other times.She does deny any hypoglycemia or hypoglycemic signs or symptoms.  POCT glucose today 6.4% POCT A1c returned today 102  Additional medical history includes that of Hypertension and Obesity along with Osteoarthritis.  Current Medications: metoprolol Er 25 mg. Also taking Tylenol, Motrin, MiraLAX.   PCP: Dr. Almaraz in [place].  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 
Dx SOB, PMH anxiety and depression

## 2024-11-25 ENCOUNTER — APPOINTMENT (OUTPATIENT)
Dept: ENDOCRINOLOGY | Facility: CLINIC | Age: 64
End: 2024-11-25

## 2024-11-25 VITALS
HEIGHT: 66 IN | SYSTOLIC BLOOD PRESSURE: 123 MMHG | DIASTOLIC BLOOD PRESSURE: 82 MMHG | OXYGEN SATURATION: 98 % | BODY MASS INDEX: 36.99 KG/M2 | WEIGHT: 230.13 LBS | HEART RATE: 67 BPM

## 2024-11-25 DIAGNOSIS — E78.5 HYPERLIPIDEMIA, UNSPECIFIED: ICD-10-CM

## 2024-11-25 DIAGNOSIS — E66.9 OBESITY, UNSPECIFIED: ICD-10-CM

## 2024-11-25 DIAGNOSIS — I10 ESSENTIAL (PRIMARY) HYPERTENSION: ICD-10-CM

## 2024-11-25 DIAGNOSIS — E11.9 TYPE 2 DIABETES MELLITUS W/OUT COMPLICATIONS: ICD-10-CM

## 2024-11-25 LAB
GLUCOSE BLDC GLUCOMTR-MCNC: 90
HBA1C MFR BLD HPLC: 5.5

## 2024-11-25 PROCEDURE — G2211 COMPLEX E/M VISIT ADD ON: CPT | Mod: NC

## 2024-11-25 PROCEDURE — 83036 HEMOGLOBIN GLYCOSYLATED A1C: CPT | Mod: QW

## 2024-11-25 PROCEDURE — 82962 GLUCOSE BLOOD TEST: CPT

## 2024-11-25 PROCEDURE — 99214 OFFICE O/P EST MOD 30 MIN: CPT

## 2024-11-26 RX ORDER — TIRZEPATIDE 7.5 MG/.5ML
7.5 INJECTION, SOLUTION SUBCUTANEOUS
Qty: 3 | Refills: 1 | Status: ACTIVE | COMMUNITY
Start: 2024-11-26 | End: 1900-01-01

## 2025-03-17 ENCOUNTER — LABORATORY RESULT (OUTPATIENT)
Age: 65
End: 2025-03-17

## 2025-03-17 ENCOUNTER — APPOINTMENT (OUTPATIENT)
Dept: CARDIOLOGY | Facility: CLINIC | Age: 65
End: 2025-03-17
Payer: COMMERCIAL

## 2025-03-17 VITALS
BODY MASS INDEX: 37.28 KG/M2 | DIASTOLIC BLOOD PRESSURE: 80 MMHG | SYSTOLIC BLOOD PRESSURE: 128 MMHG | HEIGHT: 66 IN | WEIGHT: 232 LBS | HEART RATE: 84 BPM | TEMPERATURE: 98 F | OXYGEN SATURATION: 97 %

## 2025-03-17 DIAGNOSIS — E11.9 TYPE 2 DIABETES MELLITUS W/OUT COMPLICATIONS: ICD-10-CM

## 2025-03-17 DIAGNOSIS — E66.9 OBESITY, UNSPECIFIED: ICD-10-CM

## 2025-03-17 DIAGNOSIS — E78.5 HYPERLIPIDEMIA, UNSPECIFIED: ICD-10-CM

## 2025-03-17 DIAGNOSIS — R93.1 ABNORMAL FINDINGS ON DIAGNOSTIC IMAGING OF HEART AND CORONARY CIRCULATION: ICD-10-CM

## 2025-03-17 DIAGNOSIS — I10 ESSENTIAL (PRIMARY) HYPERTENSION: ICD-10-CM

## 2025-03-17 DIAGNOSIS — Z71.85 ENCOUNTER FOR IMMUNIZATION SAFETY COUNSELING: ICD-10-CM

## 2025-03-17 PROCEDURE — 93000 ELECTROCARDIOGRAM COMPLETE: CPT

## 2025-03-17 PROCEDURE — 99214 OFFICE O/P EST MOD 30 MIN: CPT

## 2025-03-17 PROCEDURE — G2211 COMPLEX E/M VISIT ADD ON: CPT | Mod: NC

## 2025-03-18 ENCOUNTER — NON-APPOINTMENT (OUTPATIENT)
Age: 65
End: 2025-03-18

## 2025-03-18 LAB
ALBUMIN SERPL ELPH-MCNC: 4.2 G/DL
ALP BLD-CCNC: 106 U/L
ALT SERPL-CCNC: 17 U/L
ANION GAP SERPL CALC-SCNC: 12 MMOL/L
AST SERPL-CCNC: 18 U/L
BASOPHILS # BLD AUTO: 0.06 K/UL
BASOPHILS NFR BLD AUTO: 1.1 %
BILIRUB DIRECT SERPL-MCNC: 0.1 MG/DL
BILIRUB INDIRECT SERPL-MCNC: 0.3 MG/DL
BILIRUB SERPL-MCNC: 0.4 MG/DL
BUN SERPL-MCNC: 10 MG/DL
CALCIUM SERPL-MCNC: 9.4 MG/DL
CHLORIDE SERPL-SCNC: 104 MMOL/L
CHOLEST SERPL-MCNC: 200 MG/DL
CK SERPL-CCNC: 122 U/L
CO2 SERPL-SCNC: 27 MMOL/L
CREAT SERPL-MCNC: 0.85 MG/DL
EGFRCR SERPLBLD CKD-EPI 2021: 77 ML/MIN/1.73M2
EOSINOPHIL # BLD AUTO: 0.21 K/UL
EOSINOPHIL NFR BLD AUTO: 3.7 %
ESTIMATED AVERAGE GLUCOSE: 111 MG/DL
FERRITIN SERPL-MCNC: 279 NG/ML
FOLATE SERPL-MCNC: 19.6 NG/ML
GLUCOSE SERPL-MCNC: 81 MG/DL
HAPTOGLOB SERPL-MCNC: 218 MG/DL
HBA1C MFR BLD HPLC: 5.5 %
HBV CORE IGG+IGM SER QL: NONREACTIVE
HBV CORE IGM SER QL: NONREACTIVE
HBV SURFACE AB SER QL: NONREACTIVE
HBV SURFACE AB SERPL IA-ACNC: <3.3 MIU/ML
HBV SURFACE AG SER QL: NONREACTIVE
HCT VFR BLD CALC: 40 %
HCV AB SER QL: NONREACTIVE
HCV S/CO RATIO: 0.06 S/CO
HDLC SERPL-MCNC: 79 MG/DL
HEPATITIS A IGG ANTIBODY: REACTIVE
HGB BLD-MCNC: 12.8 G/DL
IMM GRANULOCYTES NFR BLD AUTO: 0.2 %
IRON SERPL-MCNC: 58 UG/DL
LDH SERPL-CCNC: 185 U/L
LDLC SERPL-MCNC: 103 MG/DL
LYMPHOCYTES # BLD AUTO: 2.37 K/UL
LYMPHOCYTES NFR BLD AUTO: 41.7 %
MAGNESIUM SERPL-MCNC: 2 MG/DL
MAN DIFF?: NORMAL
MCHC RBC-ENTMCNC: 27.3 PG
MCHC RBC-ENTMCNC: 32 G/DL
MCV RBC AUTO: 85.3 FL
MEV IGG FLD QL IA: 234 AU/ML
MEV IGG+IGM SER-IMP: POSITIVE
MONOCYTES # BLD AUTO: 0.49 K/UL
MONOCYTES NFR BLD AUTO: 8.6 %
MUV AB SER-ACNC: POSITIVE
MUV IGG SER QL IA: 12.1 AU/ML
NEUTROPHILS # BLD AUTO: 2.55 K/UL
NEUTROPHILS NFR BLD AUTO: 44.7 %
NONHDLC SERPL-MCNC: 121 MG/DL
PHOSPHATE SERPL-MCNC: 3.9 MG/DL
PLATELET # BLD AUTO: 225 K/UL
POTASSIUM SERPL-SCNC: 4.4 MMOL/L
PROT SERPL-MCNC: 6.4 G/DL
RBC # BLD: 4.69 M/UL
RBC # FLD: 13.8 %
RUBV IGG FLD-ACNC: 16.4 INDEX
RUBV IGG SER-IMP: POSITIVE
SODIUM SERPL-SCNC: 143 MMOL/L
TRANSFERRIN SERPL-MCNC: 187 MG/DL
TRIGL SERPL-MCNC: 103 MG/DL
VIT B12 SERPL-MCNC: 1424 PG/ML
VZV AB TITR SER: POSITIVE
VZV IGG SER IF-ACNC: 4.29 S/CO
WBC # FLD AUTO: 5.69 K/UL

## 2025-03-18 RX ORDER — ROSUVASTATIN CALCIUM 5 MG/1
5 TABLET, FILM COATED ORAL
Qty: 90 | Refills: 1 | Status: ACTIVE | COMMUNITY
Start: 2025-03-18 | End: 1900-01-01

## 2025-03-19 RX ORDER — BLOOD SUGAR DIAGNOSTIC
STRIP MISCELLANEOUS
Qty: 1 | Refills: 3 | Status: ACTIVE | COMMUNITY
Start: 2025-03-18 | End: 1900-01-01

## 2025-03-21 ENCOUNTER — NON-APPOINTMENT (OUTPATIENT)
Age: 65
End: 2025-03-21

## 2025-03-21 LAB
M TB IFN-G BLD-IMP: NEGATIVE
QUANTIFERON TB PLUS MITOGEN MINUS NIL: >10 IU/ML
QUANTIFERON TB PLUS NIL: 0.11 IU/ML
QUANTIFERON TB PLUS TB1 MINUS NIL: 0 IU/ML
QUANTIFERON TB PLUS TB2 MINUS NIL: 0 IU/ML

## 2025-03-23 ENCOUNTER — OFFICE (OUTPATIENT)
Facility: LOCATION | Age: 65
Setting detail: OPHTHALMOLOGY
End: 2025-03-23
Payer: COMMERCIAL

## 2025-03-23 DIAGNOSIS — E11.9: ICD-10-CM

## 2025-03-23 DIAGNOSIS — H40.013: ICD-10-CM

## 2025-03-23 DIAGNOSIS — H25.13: ICD-10-CM

## 2025-03-23 DIAGNOSIS — H18.513: ICD-10-CM

## 2025-03-23 PROBLEM — H16.223 DRY EYE SYNDROME K SICCA; BOTH EYES: Status: ACTIVE | Noted: 2025-03-23

## 2025-03-23 PROCEDURE — 92014 COMPRE OPH EXAM EST PT 1/>: CPT | Performed by: OPHTHALMOLOGY

## 2025-03-23 PROCEDURE — 92083 EXTENDED VISUAL FIELD XM: CPT | Performed by: OPHTHALMOLOGY

## 2025-03-23 PROCEDURE — 92133 CPTRZD OPH DX IMG PST SGM ON: CPT | Performed by: OPHTHALMOLOGY

## 2025-03-23 ASSESSMENT — PACHYMETRY
OS_CT_CORRECTION: -3
OD_CT_UM: 578
OD_CT_CORRECTION: -2
OS_CT_UM: 582

## 2025-03-23 ASSESSMENT — REFRACTION_AUTOREFRACTION
OS_SPHERE: +3.75
OD_CYLINDER: -1.00
OS_CYLINDER: -1.75
OD_AXIS: 092
OS_AXIS: 096
OD_SPHERE: +3.25

## 2025-03-23 ASSESSMENT — REFRACTION_CURRENTRX
OD_AXIS: 180
OS_CYLINDER: SPHERE
OD_VPRISM_DIRECTION: PROGS
OD_SPHERE: +2.25
OS_SPHERE: +2.25
OS_CYLINDER: -1.25
OD_ADD: +2.50
OS_VPRISM_DIRECTION: PROGS
OD_ADD: +2.25
OS_VPRISM_DIRECTION: PROGS
OD_CYLINDER: 0.00
OS_OVR_VA: 20/
OD_ADD: +2.25
OD_OVR_VA: 20/
OS_CYLINDER: 0.00
OS_ADD: +2.50
OS_OVR_VA: 20/
OD_SPHERE: +2.25
OS_ADD: +2.25
OS_OVR_VA: 20/
OD_OVR_VA: 20/
OD_VPRISM_DIRECTION: PROGS
OD_CYLINDER: SPHERE
OD_AXIS: 100
OS_AXIS: 094
OS_SPHERE: +3.00
OD_OVR_VA: 20/
OD_SPHERE: +2.75
OS_ADD: +2.50
OS_AXIS: 180
OD_CYLINDER: -0.75
OS_SPHERE: +2.00

## 2025-03-23 ASSESSMENT — SUPERFICIAL PUNCTATE KERATITIS (SPK)
OS_SPK: 1+ 2+
OD_SPK: 1+ 2+

## 2025-03-23 ASSESSMENT — KERATOMETRY
OD_K1POWER_DIOPTERS: 44.75
OS_AXISANGLE_DEGREES: 090
OD_AXISANGLE_DEGREES: 090
OS_K2POWER_DIOPTERS: 44.25
OS_K1POWER_DIOPTERS: 44.25
METHOD_AUTO_MANUAL: AUTO
OD_K2POWER_DIOPTERS: 44.75

## 2025-03-23 ASSESSMENT — VISUAL ACUITY
OS_BCVA: 20/20-1
OD_BCVA: 20/40+

## 2025-03-23 ASSESSMENT — REFRACTION_MANIFEST
OD_CYLINDER: -1.00
OS_SPHERE: +3.25
OD_VA1: 20/20-1
OS_VA2: 20/25(J1)
OD_SPHERE: +3.00
OS_VA1: 20/25
OS_ADD: +2.50
OD_AXIS: 095
OD_VA2: 20/25(J1)
OD_ADD: +2.50
OS_AXIS: 100
OS_CYLINDER: -1.50

## 2025-03-23 ASSESSMENT — TONOMETRY
OS_IOP_MMHG: 14
OD_IOP_MMHG: 14

## 2025-03-23 ASSESSMENT — CORNEAL DYSTROPHY - POSTERIOR
OS_POSTERIORDYSTROPHY: MOD GUTTATA
OD_POSTERIORDYSTROPHY: MOD GUTTATA

## 2025-03-23 ASSESSMENT — CONFRONTATIONAL VISUAL FIELD TEST (CVF)
OD_FINDINGS: FULL
OS_FINDINGS: FULL

## 2025-03-23 ASSESSMENT — TEAR BREAK UP TIME (TBUT)
OS_TBUT: 2+
OD_TBUT: 2+

## 2025-04-15 ENCOUNTER — APPOINTMENT (OUTPATIENT)
Dept: ENDOCRINOLOGY | Facility: CLINIC | Age: 65
End: 2025-04-15

## 2025-04-18 ENCOUNTER — APPOINTMENT (OUTPATIENT)
Dept: CT IMAGING | Facility: CLINIC | Age: 65
End: 2025-04-18
Payer: COMMERCIAL

## 2025-04-18 ENCOUNTER — OUTPATIENT (OUTPATIENT)
Dept: OUTPATIENT SERVICES | Facility: HOSPITAL | Age: 65
LOS: 1 days | End: 2025-04-18
Payer: COMMERCIAL

## 2025-04-18 DIAGNOSIS — Z98.890 OTHER SPECIFIED POSTPROCEDURAL STATES: Chronic | ICD-10-CM

## 2025-04-18 DIAGNOSIS — Z00.8 ENCOUNTER FOR OTHER GENERAL EXAMINATION: ICD-10-CM

## 2025-04-18 DIAGNOSIS — Z98.84 BARIATRIC SURGERY STATUS: Chronic | ICD-10-CM

## 2025-04-18 DIAGNOSIS — Z87.19 PERSONAL HISTORY OF OTHER DISEASES OF THE DIGESTIVE SYSTEM: Chronic | ICD-10-CM

## 2025-04-18 DIAGNOSIS — E78.5 HYPERLIPIDEMIA, UNSPECIFIED: ICD-10-CM

## 2025-04-18 DIAGNOSIS — Z96.651 PRESENCE OF RIGHT ARTIFICIAL KNEE JOINT: Chronic | ICD-10-CM

## 2025-04-18 DIAGNOSIS — Z98.1 ARTHRODESIS STATUS: Chronic | ICD-10-CM

## 2025-04-18 PROCEDURE — 75571 CT HRT W/O DYE W/CA TEST: CPT

## 2025-04-18 PROCEDURE — 75571 CT HRT W/O DYE W/CA TEST: CPT | Mod: 26

## 2025-04-23 ENCOUNTER — NON-APPOINTMENT (OUTPATIENT)
Age: 65
End: 2025-04-23

## 2025-05-07 ENCOUNTER — APPOINTMENT (OUTPATIENT)
Dept: ENDOCRINOLOGY | Facility: CLINIC | Age: 65
End: 2025-05-07

## 2025-08-26 ENCOUNTER — APPOINTMENT (OUTPATIENT)
Dept: ENDOCRINOLOGY | Facility: CLINIC | Age: 65
End: 2025-08-26
Payer: COMMERCIAL

## 2025-08-26 VITALS
HEIGHT: 66 IN | DIASTOLIC BLOOD PRESSURE: 82 MMHG | SYSTOLIC BLOOD PRESSURE: 135 MMHG | HEART RATE: 92 BPM | OXYGEN SATURATION: 98 % | WEIGHT: 233.13 LBS | BODY MASS INDEX: 37.47 KG/M2

## 2025-08-26 PROCEDURE — G2211 COMPLEX E/M VISIT ADD ON: CPT | Mod: NC

## 2025-08-26 PROCEDURE — 99214 OFFICE O/P EST MOD 30 MIN: CPT

## 2025-08-26 PROCEDURE — 83036 HEMOGLOBIN GLYCOSYLATED A1C: CPT | Mod: QW

## 2025-08-26 PROCEDURE — 36415 COLL VENOUS BLD VENIPUNCTURE: CPT

## 2025-08-26 PROCEDURE — 82962 GLUCOSE BLOOD TEST: CPT

## 2025-08-27 LAB
25(OH)D3 SERPL-MCNC: 38 NG/ML
ALBUMIN SERPL ELPH-MCNC: 4.2 G/DL
ALBUMIN, RANDOM URINE: <1.2 MG/DL
ALP BLD-CCNC: 103 U/L
ALT SERPL-CCNC: 22 U/L
ANION GAP SERPL CALC-SCNC: 13 MMOL/L
APO B SERPL-MCNC: 98 MG/DL
AST SERPL-CCNC: 26 U/L
BASOPHILS # BLD AUTO: 0.04 K/UL
BASOPHILS NFR BLD AUTO: 0.9 %
BILIRUB SERPL-MCNC: 0.4 MG/DL
BUN SERPL-MCNC: 9 MG/DL
CALCIUM SERPL-MCNC: 9.3 MG/DL
CHLORIDE SERPL-SCNC: 105 MMOL/L
CHOLEST SERPL-MCNC: 221 MG/DL
CK SERPL-CCNC: 190 U/L
CO2 SERPL-SCNC: 24 MMOL/L
CREAT SERPL-MCNC: 0.88 MG/DL
CREAT SPEC-SCNC: 103 MG/DL
EGFRCR SERPLBLD CKD-EPI 2021: 74 ML/MIN/1.73M2
EOSINOPHIL # BLD AUTO: 0.19 K/UL
EOSINOPHIL NFR BLD AUTO: 4.2 %
ESTIMATED AVERAGE GLUCOSE: 111 MG/DL
FRUCTOSAMINE SERPL-MCNC: 219 UMOL/L
GLUCOSE BLDC GLUCOMTR-MCNC: 87
GLUCOSE SERPL-MCNC: 84 MG/DL
GLYCOMARK.: 32.9 UG/ML
HBA1C MFR BLD HPLC: 5.3
HBA1C MFR BLD HPLC: 5.5 %
HCT VFR BLD CALC: 40.4 %
HDLC SERPL-MCNC: 75 MG/DL
HGB BLD-MCNC: 13 G/DL
IMM GRANULOCYTES NFR BLD AUTO: 0.2 %
LDLC SERPL DIRECT ASSAY-MCNC: 122 MG/DL
LYMPHOCYTES # BLD AUTO: 1.95 K/UL
LYMPHOCYTES NFR BLD AUTO: 43.5 %
MAGNESIUM SERPL-MCNC: 2.2 MG/DL
MAN DIFF?: NORMAL
MCHC RBC-ENTMCNC: 27.7 PG
MCHC RBC-ENTMCNC: 32.2 G/DL
MCV RBC AUTO: 86 FL
MICROALBUMIN/CREAT 24H UR-RTO: NORMAL MG/G
MONOCYTES # BLD AUTO: 0.36 K/UL
MONOCYTES NFR BLD AUTO: 8 %
NEUTROPHILS # BLD AUTO: 1.93 K/UL
NEUTROPHILS NFR BLD AUTO: 43.2 %
PLATELET # BLD AUTO: 211 K/UL
POTASSIUM SERPL-SCNC: 5 MMOL/L
PROT SERPL-MCNC: 6.5 G/DL
RBC # BLD: 4.7 M/UL
RBC # FLD: 14 %
SODIUM SERPL-SCNC: 142 MMOL/L
T3FREE SERPL-MCNC: 2.85 PG/ML
T4 FREE SERPL-MCNC: 1.5 NG/DL
TRIGL SERPL-MCNC: 71 MG/DL
TSH SERPL-ACNC: 1.29 UIU/ML
URATE SERPL-MCNC: 7.3 MG/DL
VIT B12 SERPL-MCNC: 1636 PG/ML
WBC # FLD AUTO: 4.48 K/UL

## 2025-09-05 ENCOUNTER — APPOINTMENT (OUTPATIENT)
Dept: CARDIOLOGY | Facility: CLINIC | Age: 65
End: 2025-09-05
Payer: COMMERCIAL

## 2025-09-05 ENCOUNTER — NON-APPOINTMENT (OUTPATIENT)
Age: 65
End: 2025-09-05

## 2025-09-05 VITALS
SYSTOLIC BLOOD PRESSURE: 130 MMHG | HEIGHT: 66 IN | HEART RATE: 74 BPM | BODY MASS INDEX: 37.45 KG/M2 | DIASTOLIC BLOOD PRESSURE: 80 MMHG | OXYGEN SATURATION: 99 % | TEMPERATURE: 98.6 F | WEIGHT: 233 LBS

## 2025-09-05 DIAGNOSIS — Z71.85 ENCOUNTER FOR IMMUNIZATION SAFETY COUNSELING: ICD-10-CM

## 2025-09-05 DIAGNOSIS — R93.1 ABNORMAL FINDINGS ON DIAGNOSTIC IMAGING OF HEART AND CORONARY CIRCULATION: ICD-10-CM

## 2025-09-05 DIAGNOSIS — I10 ESSENTIAL (PRIMARY) HYPERTENSION: ICD-10-CM

## 2025-09-05 DIAGNOSIS — E66.9 OBESITY, UNSPECIFIED: ICD-10-CM

## 2025-09-05 DIAGNOSIS — Z13.228 ENCOUNTER FOR SCREENING FOR OTHER METABOLIC DISORDERS: ICD-10-CM

## 2025-09-05 DIAGNOSIS — E78.5 HYPERLIPIDEMIA, UNSPECIFIED: ICD-10-CM

## 2025-09-05 DIAGNOSIS — R82.90 UNSPECIFIED ABNORMAL FINDINGS IN URINE: ICD-10-CM

## 2025-09-05 DIAGNOSIS — Z12.11 ENCOUNTER FOR SCREENING FOR MALIGNANT NEOPLASM OF COLON: ICD-10-CM

## 2025-09-05 DIAGNOSIS — I35.1 NONRHEUMATIC AORTIC (VALVE) INSUFFICIENCY: ICD-10-CM

## 2025-09-05 DIAGNOSIS — R74.8 ABNORMAL LEVELS OF OTHER SERUM ENZYMES: ICD-10-CM

## 2025-09-05 DIAGNOSIS — E11.9 TYPE 2 DIABETES MELLITUS W/OUT COMPLICATIONS: ICD-10-CM

## 2025-09-05 DIAGNOSIS — R25.2 CRAMP AND SPASM: ICD-10-CM

## 2025-09-05 PROCEDURE — 99214 OFFICE O/P EST MOD 30 MIN: CPT | Mod: 25

## 2025-09-05 PROCEDURE — 93000 ELECTROCARDIOGRAM COMPLETE: CPT

## 2025-09-05 PROCEDURE — 93306 TTE W/DOPPLER COMPLETE: CPT

## 2025-09-08 PROBLEM — R82.90 ABNORMAL URINALYSIS: Status: ACTIVE | Noted: 2025-09-08

## 2025-09-08 LAB
APPEARANCE: CLEAR
BACTERIA: ABNORMAL /HPF
BILIRUBIN URINE: NEGATIVE
BLOOD URINE: NEGATIVE
CAST: 0 /LPF
COLOR: YELLOW
EPITHELIAL CELLS: 17 /HPF
GLUCOSE QUALITATIVE U: NEGATIVE MG/DL
KETONES URINE: NEGATIVE MG/DL
LEUKOCYTE ESTERASE URINE: ABNORMAL
MICROSCOPIC-UA: NORMAL
NITRITE URINE: NEGATIVE
PH URINE: 6.5
PROTEIN URINE: NEGATIVE MG/DL
RED BLOOD CELLS URINE: 0 /HPF
REVIEW: NORMAL
SPECIFIC GRAVITY URINE: 1.02
UROBILINOGEN URINE: 0.2 MG/DL
WHITE BLOOD CELLS URINE: 3 /HPF